# Patient Record
Sex: MALE | Race: WHITE
[De-identification: names, ages, dates, MRNs, and addresses within clinical notes are randomized per-mention and may not be internally consistent; named-entity substitution may affect disease eponyms.]

---

## 2017-05-09 NOTE — PDGENHP
History and Physical





- Chief Complaint


  Acute weakness





- History of Present Illness


 PCP:  Dr. Vargas





HPI:  56-year-old male presenting with acute weakness characterized as 

generalized with associated jaundice and constipation, rendering him unable to 

ambulate and care for self at transitional housing.  The patient reports that 

his onset of symptoms was several months ago and duration has been persistently 

worsening thereafter.  His weakness is exacerbated most pronounced when he 

attempts to ambulate and this has resulted in him being very minimally active 

over the past 24 hours.  He does report that he has been continuing to drink 

alcohol in an attempt to self medicate depression after his mother's death 

several months ago.  His last drink of alcohol was the morning of this 

presentation.  He also endorses some pain located in his right lower quadrant 

which he believes is somewhat alleviated by ibuprofen.  He denies any nausea 

vomiting diarrhea or dysuria but does endorse a cough and reports the abrasions 

on his face and forehead are from chronic picking.





History Information





- Allergies/Home Medication List


Allergies/Adverse Reactions: 








No Known Allergies Allergy (Verified 05/09/17 16:11)


 





Home Medications: 








Gabapentin [Neurontin 300 MG (*)] 300 mg PO BID 05/09/17 [Last Taken 05/09/17]


Ibuprofen Dose Unknown 1 tab PO DAILY 05/09/17 [Last Taken Unknown]





I have personally reviewed and updated: family history, medical history, social 

history, surgical history





- Past Medical History


hypertension


Additional medical history: Alcoholism.  Hepatitis-C virus.  Necrotizing 

fasciitis right lower extremity.  Depression with previous hospitalizations in 

behavioral health





- Surgical History


Reports: no pertinent surgical hx





- Family History


Additional family history: Mother with Alzheimer's dementia and major 

depressive disorder





- Social History


Smoking Status: Never smoked


Alcohol Use: Occasionally


Drug Use: None


Additional social history: lives in transitional housing [facility]





Review of Systems


ROS: 10pt was reviewed & negative except for what was stated in HPI & below


Constitutional: Reports: weakness


Respiratory: Reports: cough


Gastrointestinal: Reports: abdominal pain





Physical Exam














Temp Pulse Resp BP Pulse Ox


 


 36.8 C   129 H  18   135/92 H  94 


 


 05/09/17 20:24  05/09/17 20:24  05/09/17 20:24  05/09/17 20:24  05/09/17 20:24











Constitutional: no apparent distress, not in pain, chronically ill appearing, 

obese, uncomfortable, unkempt


Eyes: EOMI, icteric sclera, other ( slightly dilated pupils)


Ears, Nose, Mouth, Throat: hearing normal, other ( tacky mucous membranes)


Cardiovascular: systolic murmur ( 2/6 at the sternum), tachycardia, No 

irregularly irregular, No edema


Respiratory: expiratory wheeze ( bilateral), No reduced air movement, No 

inspiratory crackles, No bronchial breath sounds, No respiratory distress


Gastrointestinal: normoactive bowel sounds, tenderness ( right mid flank), 

ascites, distension ( moderate)


Skin: other ( jaundiced with abrasions on his right side of his nose for head 

without surrounding erythema)


Neurologic: AAOx3, asterixes, No weakness ( motor strength 5/5 bilateral lower 

extremities, 5/5 bilateral upper extremity)


Psychiatric: interacting appropriately, not anxious, not encephalopathic, 

thought process linear





Lab Data & Imaging Review





 05/09/17 16:08





 05/09/17 16:08














WBC  7.83 10^3/uL (3.80-9.50)   05/09/17  16:08    


 


RBC  3.84 10^6/uL (4.40-6.38)  L  05/09/17  16:08    


 


Hgb  14.5 g/dL (13.7-17.5)   05/09/17  16:08    


 


Hct  40.0 % (40.0-51.0)   05/09/17  16:08    


 


MCV  104.2 fL (81.5-99.8)  H  05/09/17  16:08    


 


MCH  37.8 pg (27.9-34.1)  H  05/09/17  16:08    


 


MCHC  36.3 g/dL (32.4-36.7)   05/09/17  16:08    


 


RDW  17.7 % (11.5-15.2)  H  05/09/17  16:08    


 


Plt Count  127 10^3/uL (150-400)  L  05/09/17  16:08    


 


MPV  11.2 fL (8.7-11.7)   05/09/17  16:08    


 


Neut % (Auto)  65.9 % (39.3-74.2)   05/09/17  16:08    


 


Lymph % (Auto)  19.3 % (15.0-45.0)   05/09/17  16:08    


 


Mono % (Auto)  12.3 % (4.5-13.0)   05/09/17  16:08    


 


Eos % (Auto)  1.0 % (0.6-7.6)   05/09/17  16:08    


 


Baso % (Auto)  0.9 % (0.3-1.7)   05/09/17  16:08    


 


Nucleat RBC Rel Count  0.3 % (0.0-0.2)  H  05/09/17  16:08    


 


Absolute Neuts (auto)  5.16 10^3/uL (1.70-6.50)   05/09/17  16:08    


 


Absolute Lymphs (auto)  1.51 10^3/uL (1.00-3.00)   05/09/17  16:08    


 


Absolute Monos (auto)  0.96 10^3/uL (0.30-0.80)  H  05/09/17  16:08    


 


Absolute Eos (auto)  0.08 10^3/uL (0.03-0.40)   05/09/17  16:08    


 


Absolute Basos (auto)  0.07 10^3/uL (0.02-0.10)   05/09/17  16:08    


 


Absolute Nucleated RBC  0.02 10^3/uL (0-0.01)  H  05/09/17  16:08    


 


Immature Gran %  0.6 % (0.0-1.1)   05/09/17  16:08    


 


Immature Gran #  0.05 10^3/uL (0.00-0.10)   05/09/17  16:08    


 


Sodium  134 mEq/L (134-144)   05/09/17  16:08    


 


Potassium  3.1 mEq/L (3.5-5.2)  L  05/09/17  16:08    


 


Chloride  93 mEq/L ()  L  05/09/17  16:08    


 


Carbon Dioxide  22 mEq/l (22-31)   05/09/17  16:08    


 


Anion Gap  19 mEq/L (8-16)  H  05/09/17  16:08    


 


BUN  6 mg/dL (7-23)  L  05/09/17  16:08    


 


Creatinine  0.6 mg/dL (0.7-1.3)  L  05/09/17  16:08    


 


Estimated GFR  > 60   05/09/17  16:08    


 


Glucose  107 mg/dL ()  H  05/09/17  16:08    


 


Calcium  8.5 mg/dL (8.5-10.4)   05/09/17  16:08    


 


Total Bilirubin  6.5 mg/dL (0.1-1.4)  H  05/09/17  16:08    


 


Conjugated Bilirubin  5.2 mg/dL (0.0-0.5)  H  05/09/17  16:08    


 


Unconjugated Bilirubin  1.3 mg/dL (0.0-1.1)  H  05/09/17  16:08    


 


AST  123 IU/L (17-59)  H  05/09/17  16:08    


 


ALT  49 IU/L (21-72)   05/09/17  16:08    


 


Alkaline Phosphatase  174 IU/L ()  H  05/09/17  16:08    


 


Ammonia  58.0 uMOL/L (9.0-30.0)  H  05/09/17  16:15    


 


Total Protein  7.6 g/dL (6.3-8.2)   05/09/17  16:08    


 


Albumin  3.3 g/dL (3.5-5.0)  L  05/09/17  16:08    


 


Lipase  110.0 IU/L ()   05/09/17  16:08    


 


Urine Color  RAJEEV   05/09/17  17:55    


 


Urine Appearance  CLEAR   05/09/17  17:55    


 


Urine pH  6.0  (5.0-7.5)   05/09/17  17:55    


 


Ur Specific Gravity  1.016  (1.002-1.030)   05/09/17  17:55    


 


Urine Protein  1+  (NEGATIVE)  H  05/09/17  17:55    


 


Urine Ketones  TRACE  (NEGATIVE)  H  05/09/17  17:55    


 


Urine Blood  NEGATIVE  (NEGATIVE)   05/09/17  17:55    


 


Urine Nitrate  NEGATIVE  (NEGATIVE)   05/09/17  17:55    


 


Urine Bilirubin  POSITIVE  (NEGATIVE)  H  05/09/17  17:55    


 


Urine Urobilinogen  4.0 EU (0.2-1.0)  H  05/09/17  17:55    


 


Ur Leukocyte Esterase  NEGATIVE  (NEGATIVE)   05/09/17  17:55    


 


Urine RBC  NONE SEEN /hpf (0-3)   05/09/17  17:55    


 


Urine WBC  1-3 /hpf (0-3)   05/09/17  17:55    


 


Ur Epithelial Cells  NONE SEEN /lpf (NONE-1+)   05/09/17  17:55    


 


Urine Mucus  TRACE /lpf (NONE-1+)   05/09/17  17:55    


 


Urine Glucose  NEGATIVE  (NEGATIVE)   05/09/17  17:55    


 


Ethyl Alcohol  191 mg/dL (0-10)  H  05/09/17  16:08    








Visualized and Interpreted EKG results: Yes


EKG Interpretation: Positive for: other ( sinus tach with right bundle-branch 

block)





Assessment & Plan


Assessment: 








 56-year-old male presents with acute on chronic weakness and jaundice in the 

setting alcoholism, suspected alcohol induced hepatitis








Plan: 








1. Weakness.  Acute on chronic, most likely secondary to a combination of 

hepatic encephalopathy, developing cirrhosis, possibly underlying infection, 

alcohol induced myopathy


-will need extensive physical and occupational therapy evaluations to determine 

whether he is safe to return to transitional housing whether he needs to go to 

a skilled nursing facility for rehab


-will address all these issues below





2. Hepatic encephalopathy.  Evidenced by positive asterixis, slowed mental 

processes, ammonia level of 58, no outpatient use of lactulose


-give scheduled lactulose and gauge effect





3. Alcoholism.  Positive alcohol level, patient is not wildly intoxicated, he 

will be at risk for alcohol withdrawal beginning tomorrow, and if demonstrates 

signs sooner will place on CIWA protocol


-outside records reviewed including DC Summary by Isaac Rosario 2/2/15, reporting 

patient required CIWA for EtOH w/d


-he could have it degree of alcohol induced myopathy, get physical assessments 

as outlined above


-recommend complete abstinence





4. Systemic inflammatory response syndrome.  Acute, new problem this provider, 

further workup indicated.  Tachycardia and tachypnea without leukocytosis or 

fever, no clear source of infection


-unclear whether patient has evolving infection contributing to his weakness


-get chest x-ray to rule out pneumonia given his cough


-get diagnostic and therapeutic paracentesis to rule out SBP given his 

abdominal pain


-empirically give IV normal saline with supplemental potassium as well as 

prophylactic dosing of IV ceftriaxone given his possibility of varices 

rendering him at high risk for SBP during his hospitalization, 5 days of 

treatment indicated if remains hospitalized





5. Possible alcohol-induced hepatitis.  Elevated AST, elevated bilirubin level, 

right flank pain with steatohepatitis on ultrasound


-continue to treat pain with ibuprofen first-line, low-dose oxycodone 2nd line





Diet.  Regular





Prophylaxis.  Low risk patient, SCDs





Code. do not resuscitate per patient his sister would be is [place] she lives in 

[place]





Disposition.  Anticipated discharge is 5/10, pending further workup as outlined 

above, patient requires prolonged length stay for further diagnostic evaluation 

or clinical worsening, he should be upgraded to inpatient admission status at 

that time.





I have discussed patient's presentation with Dr. Amador Fletcher, we both agree 

the patient is appropriate for [facility] unit at this time.

## 2017-05-09 NOTE — EDPHY
H & P


HPI/ROS: 





CHIEF COMPLAINT:  Generalized weakness





HISTORY OF PRESENT ILLNESS:  The patient is a 56-year-old alcoholic man who 

comes from a group home near the shelter complaining of generalized weakness 

for the last month.  He states that he is hardly able to walk and get to the 

bathroom.  He has not had a fever.  No vomiting or diarrhea.  No pain.  He does 

have a history of extensive alcohol abuse, hepatitis C as well as hypertension, 

Necrotizing fasciitis of the right lower extremity, squamous cell carcinoma of 

the right shoulder.  He is jaundiced appearing although he states that he has 

not noticed.  He has been admitted previously for alcohol withdrawal.








REVIEW OF SYSTEMS:


Constitutional:  denies: chills, fever, recent illness, recent injury


EENTM: denies: blurred vision, double vision, nose congestion


Respiratory: denies: cough, shortness of breath


Cardiac: denies: chest pain, irregular heart rate, lightheadedness, palpitations


Gastrointestinal/Abdominal: denies: abdominal pain, diarrhea, nausea, vomiting, 

blood streaked stools


Genitourinary: denies: dysuria, frequency, hematuria, 


Musculoskeletal: denies: joint pain, muscle pain


Skin: several small lesions,  bruising


Neurological: denies: headache, numbness, paresthesia, tingling, dizziness, 

weakness


Hematologic/Lymphatic: denies: blood clots, easy bleeding, easy bruising


Immunologic/allergic: denies: HIV/AIDS, transplant








EXAM:


GENERAL:  Well-appearing, well-nourished and in no acute distress.


HEAD:  Atraumatic, normocephalic.


EYES:  Pupils equal round and reactive to light, extraocular movements intact, 

sclera icteric, conjunctiva are normal.


ENT:  TMs normal, nares patent, oropharynx clear without exudates.  Moist 

mucous membranes.


NECK:  Normal range of motion, supple without lymphadenopathy or JVD.


LUNGS:  Breath sounds clear to auscultation bilaterally and equal.  No wheezes 

rales or rhonchi.


HEART:  Regular rate and rhythm without murmurs, rubs or gallops.


ABDOMEN:  Soft, nontender, normoactive bowel sounds.  No guarding, no rebound.  

No masses appreciated.


BACK:  No CVA tenderness, no spinal tenderness, step-offs or deformities


EXTREMITIES:  Normal range of motion, no pitting or edema.  No clubbing or 

cyanosis.


NEUROLOGICAL:  Cranial nerves II through XII grossly intact.  Normal speech, 

normal gait.  5/5 strength, normal movement in all extremities, normal sensation


PSYCH:  Normal mood, normal affect.


SKIN:  Patient has multiple small scabs over his chest and face.  He has 

jaundice appearing





Source: Patient, EMS, Old records


Exam Limitations: Clinical condition





- Personal History


Tetanus Vaccine Date: 2013





- Medical/Surgical History


Hx Asthma: No


Hx Chronic Respiratory Disease: No


Hx Diabetes: No


Hx Cardiac Disease: Yes


Hx Renal Disease: No


Hx Cirrhosis: No


Hx Alcoholism: Yes


Hx HIV/AIDS: No


Hx Splenectomy or Spleen Trauma: No


Other PMH: necrotizing fascitis RLE 2014, HTN, Depression, ETOH





- Family History


Significant Family History: No pertinent family hx





- Social History


Smoking Status: Never smoked


Alcohol Use: Sober


Drug Use: None


Constitutional: 


 Initial Vital Signs











Temperature (C)  36.7 C   05/09/17 16:12


 


Heart Rate  130 H  05/09/17 16:12


 


Respiratory Rate  24 H  05/09/17 16:12


 


Blood Pressure  137/107 H  05/09/17 16:12


 


O2 Sat (%)  95   05/09/17 16:12








 











O2 Delivery Mode               Room Air














Allergies/Adverse Reactions: 


 





No Known Allergies Allergy (Verified 05/09/17 16:11)


 








Home Medications: 














 Medication  Instructions  Recorded


 


Gabapentin [Neurontin 300 MG (*)] 300 mg PO BID 05/09/17


 


Ibuprofen Dose Unknown 1 tab PO DAILY 05/09/17














Medical Decision Making





- Diagnostics


EKG Interpretation: 





An EKG obtained and was read and documented in trace view.  Please see trace 

view for full reading and report.  Sinus tachycardia with right bundle branch 

block, similar to previous, no acute ischemic changes 


Imaging Results: 


 Imaging Impressions





Abdomen Ultrasound  05/09/17 17:02


IMPRESSION:


 


1. Technically limited evaluation secondary to the patient's body habitus and 

overlying bowel gas, precluding evaluation of the pancreas and the mid-to-

distal portions of the abdominal aorta, and with limited penetrance of the 

liver.


2. Moderate diffuse steatosis.


3. Gallbladder sludge, with no cholelithiasis, cholecystitis, or bile duct 

dilatation.


 


Findings were discussed with KELLI BETANCOURT MD at  17:47, on 5/9/2017.


 


If there is further clinical concern regarding the patient's right upper 

quadrant abdominal pain, CT imaging could be considered.


 


 











ED Course/Re-evaluation: 





6:10 p.m. discussed the case with Dr. Christopher Maloney who will admit to the 

medical service.  He will start lactulose.


Differential Diagnosis: 





Partial list of the Differential diagnosis considered include but were not 

limited to;  cirrhosis, cephalopathy, infection, dehydration and although 

unlikely based on the history and physical exam, I also considered pancreatitis

, cholecystitis, sepsis, CVA. 








- Data Points


Laboratory Results: 


 Laboratory Results





 05/09/17 16:08 





 05/09/17 16:08 





 











  05/09/17 05/09/17 05/09/17





  17:55 16:15 16:08


 


WBC      





    


 


RBC      





    


 


Hgb      





    


 


Hct      





    


 


MCV      





    


 


MCH      





    


 


MCHC      





    


 


RDW      





    


 


Plt Count      





    


 


MPV      





    


 


Neut % (Auto)      





    


 


Lymph % (Auto)      





    


 


Mono % (Auto)      





    


 


Eos % (Auto)      





    


 


Baso % (Auto)      





    


 


Nucleat RBC Rel Count      





    


 


Absolute Neuts (auto)      





    


 


Absolute Lymphs (auto)      





    


 


Absolute Monos (auto)      





    


 


Absolute Eos (auto)      





    


 


Absolute Basos (auto)      





    


 


Absolute Nucleated RBC      





    


 


Immature Gran %      





    


 


Immature Gran #      





    


 


Sodium      





    


 


Potassium      





    


 


Chloride      





    


 


Carbon Dioxide      





    


 


Anion Gap      





    


 


BUN      





    


 


Creatinine      





    


 


Estimated GFR      





    


 


Glucose      





    


 


Calcium      





    


 


Total Bilirubin      





    


 


Conjugated Bilirubin      





    


 


Unconjugated Bilirubin      





    


 


AST      





    


 


ALT      





    


 


Alkaline Phosphatase      





    


 


Ammonia    58.0 uMOL/L H uMOL/L  





    (9.0-30.0)  


 


Total Protein      





    


 


Albumin      





    


 


Lipase      





    


 


Urine Color  RAJEEV     





    


 


Urine Appearance  CLEAR     





    


 


Urine pH  6.0     





   (5.0-7.5)   


 


Ur Specific Gravity  1.016     





   (1.002-1.030)   


 


Urine Protein  1+  H     





   (NEGATIVE)   


 


Urine Ketones  TRACE  H     





   (NEGATIVE)   


 


Urine Blood  NEGATIVE     





   (NEGATIVE)   


 


Urine Nitrate  NEGATIVE     





   (NEGATIVE)   


 


Urine Bilirubin  POSITIVE  H     





   (NEGATIVE)   


 


Urine Urobilinogen  4.0 EU H EU    





   (0.2-1.0)   


 


Ur Leukocyte Esterase  NEGATIVE     





   (NEGATIVE)   


 


Urine RBC  NONE SEEN /hpf /hpf    





   (0-3)   


 


Urine WBC  1-3 /hpf /hpf    





   (0-3)   


 


Ur Epithelial Cells  NONE SEEN /lpf /lpf    





   (NONE-1+)   


 


Urine Mucus  TRACE /lpf /lpf    





   (NONE-1+)   


 


Urine Glucose  NEGATIVE     





   (NEGATIVE)   


 


Ethyl Alcohol      191 mg/dL H mg/dL





     (0-10) 














  05/09/17 05/09/17





  16:08 16:08


 


WBC    7.83 10^3/uL 10^3/uL





    (3.80-9.50) 


 


RBC    3.84 10^6/uL L 10^6/uL





    (4.40-6.38) 


 


Hgb    14.5 g/dL g/dL





    (13.7-17.5) 


 


Hct    40.0 % %





    (40.0-51.0) 


 


MCV    104.2 fL H fL





    (81.5-99.8) 


 


MCH    37.8 pg H pg





    (27.9-34.1) 


 


MCHC    36.3 g/dL g/dL





    (32.4-36.7) 


 


RDW    17.7 % H %





    (11.5-15.2) 


 


Plt Count    127 10^3/uL L 10^3/uL





    (150-400) 


 


MPV    11.2 fL fL





    (8.7-11.7) 


 


Neut % (Auto)    65.9 % %





    (39.3-74.2) 


 


Lymph % (Auto)    19.3 % %





    (15.0-45.0) 


 


Mono % (Auto)    12.3 % %





    (4.5-13.0) 


 


Eos % (Auto)    1.0 % %





    (0.6-7.6) 


 


Baso % (Auto)    0.9 % %





    (0.3-1.7) 


 


Nucleat RBC Rel Count    0.3 % H %





    (0.0-0.2) 


 


Absolute Neuts (auto)    5.16 10^3/uL 10^3/uL





    (1.70-6.50) 


 


Absolute Lymphs (auto)    1.51 10^3/uL 10^3/uL





    (1.00-3.00) 


 


Absolute Monos (auto)    0.96 10^3/uL H 10^3/uL





    (0.30-0.80) 


 


Absolute Eos (auto)    0.08 10^3/uL 10^3/uL





    (0.03-0.40) 


 


Absolute Basos (auto)    0.07 10^3/uL 10^3/uL





    (0.02-0.10) 


 


Absolute Nucleated RBC    0.02 10^3/uL H 10^3/uL





    (0-0.01) 


 


Immature Gran %    0.6 % %





    (0.0-1.1) 


 


Immature Gran #    0.05 10^3/uL 10^3/uL





    (0.00-0.10) 


 


Sodium  134 mEq/L mEq/L  





   (134-144)  


 


Potassium  3.1 mEq/L L mEq/L  





   (3.5-5.2)  


 


Chloride  93 mEq/L L mEq/L  





   ()  


 


Carbon Dioxide  22 mEq/l mEq/l  





   (22-31)  


 


Anion Gap  19 mEq/L H mEq/L  





   (8-16)  


 


BUN  6 mg/dL L mg/dL  





   (7-23)  


 


Creatinine  0.6 mg/dL L mg/dL  





   (0.7-1.3)  


 


Estimated GFR  > 60   





   


 


Glucose  107 mg/dL H mg/dL  





   ()  


 


Calcium  8.5 mg/dL mg/dL  





   (8.5-10.4)  


 


Total Bilirubin  6.5 mg/dL H mg/dL  





   (0.1-1.4)  


 


Conjugated Bilirubin  5.2 mg/dL H mg/dL  





   (0.0-0.5)  


 


Unconjugated Bilirubin  1.3 mg/dL H mg/dL  





   (0.0-1.1)  


 


AST  123 IU/L H IU/L  





   (17-59)  


 


ALT  49 IU/L IU/L  





   (21-72)  


 


Alkaline Phosphatase  174 IU/L H IU/L  





   ()  


 


Ammonia    





   


 


Total Protein  7.6 g/dL g/dL  





   (6.3-8.2)  


 


Albumin  3.3 g/dL L g/dL  





   (3.5-5.0)  


 


Lipase  110.0 IU/L IU/L  





   ()  


 


Urine Color    





   


 


Urine Appearance    





   


 


Urine pH    





   


 


Ur Specific Gravity    





   


 


Urine Protein    





   


 


Urine Ketones    





   


 


Urine Blood    





   


 


Urine Nitrate    





   


 


Urine Bilirubin    





   


 


Urine Urobilinogen    





   


 


Ur Leukocyte Esterase    





   


 


Urine RBC    





   


 


Urine WBC    





   


 


Ur Epithelial Cells    





   


 


Urine Mucus    





   


 


Urine Glucose    





   


 


Ethyl Alcohol    





   











Medications Given: 


 








Discontinued Medications





Ammonia (Aromatic Spirit) (Ammonia Aromatic)  1 each IH EDNOW ONE


   Stop: 05/09/17 16:10


   Last Admin: 05/09/17 16:17 Dose:  Not Given


Sodium Chloride (Ns)  1,000 mls @ 0 mls/hr IV ONCE ONE


   PRN Reason: Wide Open


   Stop: 05/09/17 16:10


   Last Admin: 05/09/17 16:23 Dose:  1,000 mls


Sodium Chloride (Ns)  1,000 mls @ 0 mls/hr IV ONCE ONE


   PRN Reason: Wide Open


   Stop: 05/09/17 18:12


   Last Admin: 05/09/17 18:11 Dose:  1,000 mls


Ceftriaxone Sodium/Dextrose (Rocephin 1 Gm (Premix))  50 mls @ 100 mls/hr IV 

DAILY QUINCY


   PRN Reason: Protocol


   Stop: 06/08/17 20:59


   Last Admin: 05/09/17 21:56 Dose:  Not Given


Lorazepam (Ativan Injection)  1 mg IVP ONCE ONE


   Stop: 05/09/17 18:51


   Last Admin: 05/09/17 19:17 Dose:  1 mg








Departure





- Departure


Disposition: [facility]


Clinical Impression: 


 Alcoholism, Hyperbilirubinemia, Weakness





Condition: Fair

## 2017-05-09 NOTE — CPEKG
Heart Rate: 121

RR Interval: 496

P-R Interval: 136

QRSD Interval: 136

QT Interval: 356

QTC Interval: 505

P Axis: 70

QRS Axis: 65

T Wave Axis: 22

EKG Severity - ABNORMAL ECG -

EKG Impression: SINUS TACHYCARDIA

EKG Impression: RIGHT BUNDLE BRANCH BLOCK

Electronically Signed By: Amador Fletcher 09-May-2017 16:18:12

## 2017-05-10 NOTE — HOSPPROG
Hospitalist Progress Note


Assessment/Plan: 





*  Etoh withdrawal


   -CIWA, prn ativan


*  Etoh hepatitis


   -follow LFT


*  Hepatic encephalopathy


   -lactulose


*  Cough


   -recheck CXR in am


*  Low electrolytes due to Etoh


   -replete per protocol


*  Obesity BMI 33











Subjective: coughing badly, arthritis hurts


Objective: 


 Vital Signs











Temp Pulse Resp BP Pulse Ox


 


 36.4 C   116 H  20   103/72   92 


 


 05/10/17 15:31  05/10/17 15:33  05/10/17 15:31  05/10/17 15:33  05/10/17 15:31








 











PT  16.8 SEC (12.0-15.0)  H  05/10/17  05:15    


 


INR  1.36  (0.83-1.16)  H  05/10/17  05:15    








CXR viewed, my personal interpretation is - no PNA


ABD US - liver looks okay, no ascites


 Laboratory Tests











  05/10/17 05/10/17





  05:15 05:15


 


INR  1.36 H 


 


Phosphorus   2.4 L


 


Magnesium   1.4 L


 


Total Bilirubin   5.9 H


 


Conjugated Bilirubin   4.6 H


 


Unconjugated Bilirubin   1.3 H


 


AST   119 H


 


Alkaline Phosphatase   135 H


 


Albumin   2.7 L














- Physical Exam


Constitutional: no apparent distress, appears nourished, not in pain


Cardiovascular: regular rate and rhythym, no murmur, rub, or gallop


Respiratory: no respiratory distress, no rales or rhonchi, clear to auscultation


Gastrointestinal: normoactive bowel sounds, soft, non-tender abdomen, no 

palpable masses


Skin: no rashes or abrasions, no fluctuance, no induration


Neurologic: AAOx3, sensation intact bilaterally


Psychiatric: interacting appropriately, not anxious, not encephalopathic, 

thought process linear





ICD10 Worksheet


Patient Problems: 


 Problems











Problem Status Onset


 


Hyperbilirubinemia Acute  


 


Weakness Acute  


 


Alcoholism Chronic  


 


Alcohol dependence Active  


 


Cellulitis Active  


 


Kidney disease Active  


 


Necrotizing fasciitis Active  


 


Acute renal failure Acute  


 


Edema extremities Acute  


 


HTN (hypertension) Acute  


 


Hyperkalemia Acute  


 


MRSA (methicillin resistant staph aureus) culture positive Acute  07/21/14


 


Pneumonia Acute  


 


Squamous cell cancer of skin of right shoulder Acute  


 


Squamous cell carcinoma Acute  


 


Gait instability Chronic  


 


Hepatitis C Chronic  


 


Low back pain Chronic

## 2017-05-11 NOTE — HOSPPROG
Hospitalist Progress Note


Assessment/Plan: 





*  Etoh withdrawal


   -CIWA, prn ativan - seems resolved


*  Etoh hepatitis


   -follow LFT - improving


*  Hepatic encephalopathy


   -lactulose


*  Low electrolytes due to Etoh


   -replete per protocol


*  Obesity BMI 33


*  Acute weakness


   -PT/OT recommends SNF


   -I suspect he will rebound quickly here and be able to return home 1-2 days








Subjective: no complaints.


Objective: 


 Vital Signs











Temp Pulse Resp BP Pulse Ox


 


 36.8 C   94   18   113/81 H  93 


 


 05/11/17 11:13  05/11/17 11:13  05/11/17 11:13  05/11/17 11:13  05/11/17 11:13








 Laboratory Results





 05/11/17 12:32 





 05/11/17 12:32 





 











 05/10/17 05/11/17 05/12/17





 05:59 05:59 05:59


 


Intake Total  250 240


 


Balance  250 240








 











PT  16.8 SEC (12.0-15.0)  H  05/10/17  05:15    


 


INR  1.36  (0.83-1.16)  H  05/10/17  05:15    








CXR viewed, my personal interpretation is - no PNA, + atelectasis





- Physical Exam


Constitutional: no apparent distress, appears nourished, not in pain


Cardiovascular: regular rate and rhythym, no murmur, rub, or gallop


Respiratory: no respiratory distress, no rales or rhonchi, clear to auscultation


Gastrointestinal: normoactive bowel sounds, soft, non-tender abdomen, no 

palpable masses


Skin: no rashes or abrasions, no fluctuance, no induration


Neurologic: AAOx3, sensation intact bilaterally


Psychiatric: interacting appropriately, not anxious, not encephalopathic, 

thought process linear





ICD10 Worksheet


Patient Problems: 


 Problems











Problem Status Onset


 


Hyperbilirubinemia Acute  


 


Weakness Acute  


 


Alcoholism Chronic  


 


Alcohol dependence Active  


 


Cellulitis Active  


 


Kidney disease Active  


 


Necrotizing fasciitis Active  


 


Acute renal failure Acute  


 


Edema extremities Acute  


 


HTN (hypertension) Acute  


 


Hyperkalemia Acute  


 


MRSA (methicillin resistant staph aureus) culture positive Acute  07/21/14


 


Pneumonia Acute  


 


Squamous cell cancer of skin of right shoulder Acute  


 


Squamous cell carcinoma Acute  


 


Gait instability Chronic  


 


Hepatitis C Chronic  


 


Low back pain Chronic

## 2017-05-12 NOTE — HOSPPROG
Hospitalist Progress Note


Assessment/Plan: 





*  Etoh withdrawal


   -CIWA, prn ativan - seems resolved


*  Etoh hepatitis


   -follow LFT


*  Hepatic encephalopathy - ammonia rising


   -lactulose use limited by profuse diarrhea - check stool studies


   -add rifaximin


*  Low electrolytes due to Etoh


   -replete per protocol


*  Obesity BMI 33











Subjective: Diarrhea, poor continence of stool


Objective: 


 Vital Signs











Temp Pulse Resp BP Pulse Ox


 


 36.6 C   84   16   120/79   93 


 


 05/12/17 15:35  05/12/17 15:35  05/12/17 15:35  05/12/17 15:35  05/12/17 15:35








 Laboratory Results





 05/12/17 04:51 





 05/12/17 04:51 





 











 05/11/17 05/12/17 05/13/17





 05:59 05:59 05:59


 


Intake Total 250 680 440


 


Balance 250 680 440








 











PT  16.8 SEC (12.0-15.0)  H  05/10/17  05:15    


 


INR  1.36  (0.83-1.16)  H  05/10/17  05:15    














- Physical Exam


Constitutional: no apparent distress, appears nourished, not in pain


Cardiovascular: regular rate and rhythym, no murmur, rub, or gallop


Respiratory: no respiratory distress, no rales or rhonchi, clear to auscultation


Gastrointestinal: normoactive bowel sounds, soft, non-tender abdomen, no 

palpable masses


Skin: no rashes or abrasions, no fluctuance, no induration


Neurologic: AAOx3, sensation intact bilaterally


Psychiatric: interacting appropriately, not anxious, not encephalopathic, 

thought process linear





ICD10 Worksheet


Patient Problems: 


 Problems











Problem Status Onset


 


Hyperbilirubinemia Acute  


 


Weakness Acute  


 


Alcoholism Chronic  


 


Alcohol dependence Active  


 


Cellulitis Active  


 


Kidney disease Active  


 


Necrotizing fasciitis Active  


 


Acute renal failure Acute  


 


Edema extremities Acute  


 


HTN (hypertension) Acute  


 


Hyperkalemia Acute  


 


MRSA (methicillin resistant staph aureus) culture positive Acute  07/21/14


 


Pneumonia Acute  


 


Squamous cell cancer of skin of right shoulder Acute  


 


Squamous cell carcinoma Acute  


 


Gait instability Chronic  


 


Hepatitis C Chronic  


 


Low back pain Chronic

## 2017-05-13 NOTE — HOSPPROG
Hospitalist Progress Note


Assessment/Plan: 


 56-year-old alcoholic is admitted with increasing weakness.  He also has 

diarrhea and C diff colitis noted.





#    Alcoholism with acute alcohol withdrawal status post CIWA protocol, 

appears to be improved at this time





#   alcoholic hepatitis, appears to be improving.  Ammonia improved as well





#   hepatic encephalopathy, improved ammonia, mental status appears normal





#   C diff colitis with bowel incontinence, currently on p.o. Vanco will watch 

today and increase activity as tolerated likely home tomorrow or Monday





#   abnormal electrolytes, replaced per protocol.  Likely secondary to alcohol 

use





#   obesity, BMI 33








Subjective: patient new to me and chart reviewed.  Still feels quite weak and 

does not know if he can't ambulate today due to his fecal incontinence from the 

C diff.  But eating better today


Objective: 


 Vital Signs











Temp Pulse Resp BP Pulse Ox


 


 36.8 C   112 H  18   122/90 H  94 


 


 05/13/17 07:47  05/13/17 07:47  05/13/17 07:47  05/13/17 07:47  05/13/17 07:47








 Microbiology











 05/12/17 15:00 Gastrointestinal Tract Panel (PCR) - Final





 Stool    Clostridium Difficile Detected








 Laboratory Results





 05/13/17 04:42 





 05/13/17 04:42 





 











 05/12/17 05/13/17 05/14/17





 05:59 05:59 05:59


 


Intake Total 680 690 


 


Balance 680 690 








 











PT  16.8 SEC (12.0-15.0)  H  05/10/17  05:15    


 


INR  1.36  (0.83-1.16)  H  05/10/17  05:15    














- Physical Exam


Constitutional: chronically ill appearing, obese, uncomfortable


Eyes: PERRL, EOMI, icteric sclera


Ears, Nose, Mouth, Throat: moist mucous membranes


Cardiovascular: regular rate and rhythym, no murmur, rub, or gallop, pulses 

symmetric bilaterally, No tachycardia, No bradycardia, No edema


Respiratory: no respiratory distress, no rales or rhonchi, reduced air movement

, bronchial breath sounds


Gastrointestinal: normoactive bowel sounds, soft, non-tender abdomen


Genitourinary: no bladder fullness


Skin: warm, normal color


Neurologic: AAOx3, No facial droop


Psychiatric: interacting appropriately, not anxious, not encephalopathic





ICD10 Worksheet


Patient Problems: 


 Problems











Problem Status Onset


 


Alcohol dependence Active  


 


Kidney disease Active  


 


Cellulitis Active  


 


Necrotizing fasciitis Active  


 


Acute renal failure Acute  


 


Squamous cell carcinoma Acute  


 


Alcoholism Chronic  


 


Gait instability Chronic  


 


Squamous cell cancer of skin of right shoulder Acute  


 


Hyperkalemia Acute  


 


Low back pain Chronic  


 


Hepatitis C Chronic  


 


Edema extremities Acute  


 


HTN (hypertension) Acute  


 


MRSA (methicillin resistant staph aureus) culture positive Acute  07/21/14


 


Pneumonia Acute  


 


Hyperbilirubinemia Acute  


 


Weakness Acute

## 2017-05-14 NOTE — PDIAF
- Diagnosis


Diagnosis: alcoholism and withdrawal, c diff colitis, electrolyte abnormalities


Code Status: Do Not Resuscitate





- Medication Management


Discharge Medications: 


 Medications to Continue on Transfer





Gabapentin [Neurontin 300 MG (*)] 1,200 mg PO TID PRN 05/10/17 [Last Taken 

Unknown]


Ibuprofen [Motrin (*)] 800 mg PO TIDMEAL PRN 05/10/17 [Last Taken Unknown]


Metoprolol Tartrate [Lopressor 25 mg (*)] 25 mg PO DAILY 05/10/17 [Last Taken 

Unknown]


Triamcinolone 0.1% [Triamcinolone 0.1% Cream (*)] 1 elizabeth TP BID PRN 05/10/17 [

Last Taken Unknown]


Thiamine HCl [Vitamin B-1] 100 mg PO DAILY #0 tab 05/14/17 [Last Taken Unknown]


Vancomycin [Vancocin Oral Liquid] 125 mg PO QID #40 udl 05/14/17 [Last Taken 

Unknown]


oxyCODONE IR [Oxycodone Ir (*)] 2.5 - 5 mg PO Q6 PRN #25 tab 05/14/17 [Last 

Taken Unknown]








Discharge Medications: Refer to the Discharge Home Medication list for PRN 

reason.





- Orders


Services needed: Home Care, Registered Nurse, Master  (ongoing 

alcohol use with evidence of liver disease.), Physical Therapy, Occupational 

Therapy


Home Care Face to Face: I certify that this patient was under my care and that 

I had the required face-to-face encounter meeting the encounter requirements on 

the discharge day.  My findings support the fact that the patient is homebound 

as defined in CMS Chapter 7 Medicare Benefits Manual 30.1.1, The condition of 

the patient is such that there exists a normal inability to leave home and 

consequently, leaving home would require a considerable and taxing effort.


Diet Recommendation: no restrictions on diet


Diet Texture: Regular Texture Diet





- Follow Up Care


Current Providers and Referrals: 


Patient,NotPresent [Primary Care Provider] - As per Instructions

## 2017-05-14 NOTE — GDS
[f rep st]



                                                             DISCHARGE SUMMARY





DIAGNOSES:  

1.  Diffuse weakness acute on chronic likely secondary to hepatic encephalopathy, alcoholism, and fa
ilure to thrive.

2.  Hepatic encephalopathy, improved.

3.  Alcoholism, ongoing alcohol use.

4.  Alcohol-induced hepatitis, elevated LFTs and bilirubin.

5.  Low-potassium.

6.  Clostridium difficile colitis.



PROCEDURES DONE:  Abdominal ultrasound showing no significant ascites.



HOSPITAL COURSE:  The patient is a 56-year-old, currently in transitional housing.  He has ongoing a
lcohol use and came in with acute on chronic weakness.  On admission, he was found to be in acute al
cohol withdrawal, elevated LFTs consistent with alcoholic hepatitis and possible alcoholic steatosis
.  He was admitted to the hospital.  Went through alcohol withdrawal with Librium and Ativan.  His e
lectrolytes were repleted.  He was hydrated and he worked with Physical therapy.  He did develop melchor
rrhea through his stay.  This was diagnosed as C diff colitis, and he was started on appropriate ant
ibiotics.  



At the time of discharge, he is ambulating with the use of a walker and although he has ongoing weak
ness, he has improved from admission.  I suspect his overall weakness is a combination of alcohol-in
duced myopathy, alcohol-induced encephalopathy, and overall failure to thrive.  His long-term progno
sis is poor if he continues to drink.  His diarrhea was controlled.  He was having 3 bowel movements
 a day and although they were quite runny and he was sometimes incontinent, he did not appear to be 
getting dehydrated from this.



CONDITION ON DISCHARGE:  Fair.



PHYSICAL EXAMINATION:  VITAL SIGNS:  Afebrile, heart rate 90, blood pressure 122/89, respirations 16
.  He is 94% on room air.  GENERAL APPEARANCE:  He is alert and oriented.  HEART:  Regular.  LUNGS: 
 Clear.  ABDOMEN:  Soft.



DISCHARGE MEDICATIONS:  Please see discharge medication form.



FOLLOWUP:  With [facility].  I will also send him home with home care including nursing, physic
al, and occupational therapy.  I will also send the  given his ongoing alcohol use. 



Total time spent with patient on day of discharge and coordination of care is 35 minutes.



Copy requested to:

[facility]



Job #:  [id number]

## 2017-05-25 NOTE — CPEKG
Heart Rate: 84

RR Interval: 714

P-R Interval: 142

QRSD Interval: 120

QT Interval: 428

QTC Interval: 507

P Axis: 74

QRS Axis: 25

T Wave Axis: 19

EKG Severity - ABNORMAL ECG -

EKG Impression: SINUS RHYTHM

EKG Impression: RBBB

Electronically Signed By: Charley Ruiz 25-May-2017 20:27:57

## 2017-05-25 NOTE — EDPHY
H & P


Time Seen by Provider: 05/25/17 16:16


HPI/ROS: 


CHIEF COMPLAINT: Hypoxia





HISTORY OF PRESENT ILLNESS: The patient is a 56-year-old male with cirrhosis 

presenting with hypoxia at home. The patient was recently admitted admitted to 

the hospital for hepatic encephalopathy and discharged home.  Since discharge, 

he has been feeling fatigued and somewhat short of breath, but this has not 

recently changed.  His home healthcare nurse today noticed that he was hypoxic 

on pulse oximetry and that his blood pressure was low and sent him to the 

emergency department for further evaluation. The patient reports difficulty 

with taking a deep breath because his abdomen is so swollen. He states he gets 

somewhat short of breath walking to the bathroom.  His symptoms are worse at 

night and occasionally he wakes up because of the shortness of breath. The 

patient had a cough last week, now resolved. He denies chest pain, abdominal 

pain or fever. 








REVIEW OF SYSTEMS:


A comprehensive 10 point review of systems is otherwise negative aside from 

elements mentioned in the history of present illness.





Past Medical/Surgical History: 





Hypertension, Depression, cirrhosis, Alcohol abuse, Necrotizing fascitis RLE. 


Social History: 





The patient lives at home alone. 


Smoking Status: Never smoked


Physical Exam: 





General Appearance: Alert, oxygen saturation 100% on room air. No tachypnea. 


Eyes: Pupils equal and round, no conjunctival pallor or injection


ENT, Mouth: Mucous membranes moist


Neck: Normal inspection


Respiratory: Decreased breath sounds on the left, no rhonchi or rales


Cardiovascular: Regular rate and rhythm 


Gastrointestinal:  Abdomen is distended, ascites present, no tenderness


Neurological:  A&O, nonfocal, normal gait


Skin:  Warm and dry, no rash


Extremities:  Large scar on the right calf, no calf tenderness


Psychiatric: Mood and affect normal


Constitutional: 


 Initial Vital Signs











Temperature (C)  36.8 C   05/25/17 15:57


 


Heart Rate  86   05/25/17 15:57


 


Respiratory Rate  14   05/25/17 15:57


 


Blood Pressure  91/66 L  05/25/17 15:57


 


O2 Sat (%)  97   05/25/17 15:57








 











O2 Delivery Mode               Room Air














Allergies/Adverse Reactions: 


 





No Known Allergies Allergy (Verified 05/09/17 16:11)


 








Home Medications: 














 Medication  Instructions  Recorded


 


Gabapentin [Neurontin 300 MG (*)] 1,200 mg PO TID PRN 05/10/17


 


Ibuprofen [Motrin (*)] 800 mg PO TIDMEAL PRN 05/10/17


 


Metoprolol Tartrate [Lopressor 25 25 mg PO DAILY 05/10/17





mg (*)]  


 


Triamcinolone 0.1% [Triamcinolone 1 elizabeth TP BID PRN 05/10/17





0.1% Cream (*)]  


 


Thiamine HCl [Vitamin B-1] 100 mg PO DAILY #0 tab 05/14/17


 


Vancomycin [Vancocin Oral Liquid] 125 mg PO QID #40 udl 05/14/17


 


oxyCODONE IR [Oxycodone Ir (*)] 2.5 - 5 mg PO Q6 PRN #25 tab 05/14/17














Medical Decision Making





- Diagnostics


Imaging Results: 


Chest x-ray independently reviewed by me reveals no acute disease.





CT pulmonary angiogram read by the radiologist reveals no evidence of pulmonary 

embolism.  A small pleural effusion is present and the left hemidiaphragm is 

elevated.


Imaging: I viewed and interpreted images myself


ED Course/Re-evaluation: 





This patient presents with reported hypoxia at home.  Patient's oxygen 

saturation is 100% on room air in the ED. On exam he has decreased breath 

sounds on the left.  I suspect that the ascites is pushing on his diaphragm and 

making it hard for him to take a deep breath. There is no clinical evidence for 

pneumonia.  Plan for chest x-ray, EKG, BMP, CBC, and D-dimer. 





Chest x-ray has chronic findings, with an elevated left hemidiaphragm, nothing 

acute visualized. 





D-dimer is elevated at 6.48. Plan for CT chest to evaluate for PE. 





CT report was called to me by the radiologist. Results: negative for PE.  In 

addition there is no evidence of pneumonia.  The results were discussed with 

the patient.  He would like to go home.  I have instructed him to take his 

medications as prescribed, including diuretics.  I do not feel that he needs 

paracentesis at this time.  However, if he develops increasing shortness of 

breath, or hypoxia, I would consider paracentesis.  Blood pressure has been 

stable throughout his emergency department stay.  Plan to discharge home and 

have patient followup with his PCP. 


Differential Diagnosis: 





Differential diagnosis includes though it is not limited to pneumonia, 

pneumothorax, pulmonary embolism, aortic dissection, pericarditis, acute 

coronary syndrome.








- Data Points


Laboratory Results: 


 Laboratory Results





 05/25/17 16:25 





 05/25/17 16:25 











Departure





- Departure


Disposition: Home, Routine, Self-Care


Clinical Impression: 


Dyspnea


Qualifiers:


 Dyspnea type: dyspnea on exertion Qualified Code(s): R06.09 - Other forms of 

dyspnea





Condition: Good


Instructions:  Dyspnea (ED)


Additional Instructions: 


Followup with your primary care physician on Monday. Return to the Emergency 

Department with new or worsening symptoms. 


Referrals: 


Inessa Vargas,  [Primary Care Provider] - As per Instructions


Report Scribed for: Charley Ruiz


Report Scribed by: Alexandra Gundersen


Date of Report: 05/25/17


Time of Report: 16:24


Physician Review and Approval Statement: 





05/25/17 16:24


Portions of this note were transcribed by a medical scribe. I personally 

performed the history, physical exam, and medical decision-making; and 

confirmed the accuracy of the information in the transcribed note.

## 2017-06-01 NOTE — EDPHY
H & P


HPI/ROS: 





HPI





CHIEF COMPLAINT:  Generalized weakness, abdominal distension, falling at home





HISTORY OF PRESENT ILLNESS:  This patient 56-year-old male he does have 

significant past medical history for alcoholism, liver cirrhosis, presents 

emergency room by EMS after he is too weak to take care of himself.  States 

that he has generalized weakness he has been falling at home he also tells me 

that his abdomen is more distended than normal. He denies chest pain or 

shortness of breath. He does report to me feels lightheaded.  Denies recent 

fever.  He also tells me he is having large amounts of diarrhea.





Past Medical History:  Alcoholism, liver cirrhosis, C diff





Past Surgical History:  Right lower extremity surgery from a wound. 





Social History:  Daily drinker, drinks a pt of alcohol daily denies drugs or 

tobacco





Family History:  Noncontributory








ROS   


REVIEW OF SYSTEMS:


A comprehensive 10 point review of systems is otherwise negative aside from 

elements mentioned in the history of present illness.








Exam   


Constitutional   triage nursing summary reviewed, vital signs reviewed, awake/

alert. 


Eyes   icteric eyes, normal conjunctivae and sclera, EOMI, PERRLA. 


HENT   normal inspection, atraumatic, moist mucus membranes, no epistaxis, neck 

supple/ no meningismus, no raccoon eyes. 


Respiratory   clear to auscultation bilaterally, normal breath sounds, no 

respiratory distress, no wheezing. 


Cardiovascular   rate normal, regular rhythm, no murmur, no edema, distal 

pulses normal. 


Gastrointestinal   abdomen is distended with a fluid wave, soft, non-tender, no 

rebound, no guarding, normal bowel sounds, no distension, no pulsatile mass. 


Genitourinary   no CVA tenderness. 


Musculoskeletal  no midline vertebral tenderness, full range of motion, no calf 

swelling, no tenderness of extremities, no meningismus, good pulses, 

neurovascularly intact.


Skin  jaundice pink, warm, & dry, no rash, skin atraumatic. 


Neurologic   awake, alert and oriented x 3, AAOx3, moves all 4 extremities 

equally, motor intact, sensory intact, CN II-XII intact, normal cerebellar, 

normal vision, normal speech. 


Psychiatric   normal mood/affect. 


Heme/Lymph/Immune   no lymphadenopathy.





Differential Diagnosis:  Includes but is not limited to in a particular order, 

hepatorenal syndrome, worsening liver failure, electrolyte disturbance, 

dehydration, failure to thrive, generalized weakness, infection, sepsis





Medical Decision Making:  Plan for this patient IV establishment, blood work, 

ultrasound of the abdomen, chest x-ray, blood cultures obtained lactic acid 

also ammonia level.





Re-evaluation:








ED x-ray chest one view elevated left hemidiaphragm.  No focal pneumonia.





Ultrasound of the abdomen  The results of the study are shows hepatomegaly, 

portal hypertension, large amount of ascites.  I discussed the results of this 

study with the radiologist Dr. Hathaway. 





2345:  Re-evaluation at this time patient is resting comfortably.  Nontoxic 

appearing.  However has stigma of liver cirrhosis.  Nontender abdominal exam.  

There is no evidence that he is septic.  Blood cultures been pulled.  Lactic 

acid is elevated 2.4 probably due to dehydration hepatorenal syndrome.  

Increasing BUN and creatinine.  Elevated ammonia level.  Patient need to be 

admitted the hospital for hepatorenal syndrome.  Elevated ammonia level.  

Worsening liver cirrhosis.  Dehydration also has worsening liver cirrhosis and 

ascites no indication for emergent paracentesis at this time.  He is not in any 

respiratory distress and his abdomen is not tender.


Source: Patient, EMS





- Personal History


Tetanus Vaccine Date: 2013





- Medical/Surgical History


Hx Asthma: No


Hx Chronic Respiratory Disease: No


Hx Diabetes: No


Hx Cardiac Disease: Yes


Hx Renal Disease: No


Hx Cirrhosis: No


Hx Alcoholism: Yes


Hx HIV/AIDS: No


Hx Splenectomy or Spleen Trauma: No


Other PMH: necrotizing fascitis RLE 2014, HTN, Depression, ETOH





- Social History


Smoking Status: Never smoked


Constitutional: 


 Initial Vital Signs











Temperature (C)  36.5 C   06/01/17 22:18


 


Heart Rate  107 H  06/01/17 22:18


 


Respiratory Rate  24 H  06/01/17 22:18


 


Blood Pressure  124/92 H  06/01/17 22:18


 


O2 Sat (%)  94   06/01/17 22:18








 











O2 Delivery Mode               Room Air














Allergies/Adverse Reactions: 


 





No Known Allergies Allergy (Verified 05/09/17 16:11)


 








Home Medications: 














 Medication  Instructions  Recorded


 


Gabapentin [Neurontin 300 MG (*)] 1,200 mg PO TID PRN 05/10/17


 


Ibuprofen [Motrin (*)] 800 mg PO TIDMEAL PRN 05/10/17


 


Metoprolol Tartrate [Lopressor 25 25 mg PO DAILY 05/10/17





mg (*)]  


 


Triamcinolone 0.1% [Triamcinolone 1 elizabeth TP BID PRN 05/10/17





0.1% Cream (*)]  


 


Thiamine HCl [Vitamin B-1] 100 mg PO DAILY #0 tab 05/14/17


 


Vancomycin [Vancocin Oral Liquid] 125 mg PO QID #40 udl 05/14/17


 


oxyCODONE IR [Oxycodone Ir (*)] 2.5 - 5 mg PO Q6 PRN #25 tab 05/14/17














Medical Decision Making





- Diagnostics


Imaging Results: 


 Imaging Impressions





Abdomen Ultrasound  06/01/17 22:26


Impression: 


1. Hepatomegaly and features of hepatitis and hepatic steatosis.


2. Reversed main portal vein indicates portal venous hypertension and 

underlying cirrhosis.


3. Small volume ascites.


4. Gallbladder sludge similar to 4 weeks prior. No biliary dilation.


 


Findings discussed with Emergency Department physician, Derrick Roth MD  at

  6/1/2017 23:42.


 








Chest X-Ray  06/01/17 22:29


Impression:  Left basilar atelectasis and asymmetric left hemidiaphragm 

elevation unchanged.














- Data Points


Laboratory Results: 


 Laboratory Results





 06/01/17 22:35 





 06/01/17 22:35 





 











  06/01/17 06/01/17 06/01/17





  22:35 22:35 22:35


 


WBC      





    


 


RBC      





    


 


Hgb      





    


 


Hct      





    


 


MCV      





    


 


MCH      





    


 


MCHC      





    


 


RDW      





    


 


Plt Count      





    


 


MPV      





    


 


Neut % (Auto)      





    


 


Lymph % (Auto)      





    


 


Mono % (Auto)      





    


 


Eos % (Auto)      





    


 


Baso % (Auto)      





    


 


Nucleat RBC Rel Count      





    


 


Absolute Neuts (auto)      





    


 


Absolute Lymphs (auto)      





    


 


Absolute Monos (auto)      





    


 


Absolute Eos (auto)      





    


 


Absolute Basos (auto)      





    


 


Absolute Nucleated RBC      





    


 


Immature Gran %      





    


 


Immature Gran #      





    


 


PT      





    


 


INR      





    


 


APTT      





    


 


VBG Lactic Acid      





    


 


Sodium      135 mEq/L mEq/L





     (134-144) 


 


Potassium      3.4 mEq/L L mEq/L





     (3.5-5.2) 


 


Chloride      99 mEq/L mEq/L





     () 


 


Carbon Dioxide      23 mEq/l mEq/l





     (22-31) 


 


Anion Gap      13 mEq/L mEq/L





     (8-16) 


 


BUN      24 mg/dL H mg/dL





     (7-23) 


 


Creatinine      2.0 mg/dL H mg/dL





     (0.7-1.3) 


 


Estimated GFR      34 





    


 


Glucose      111 mg/dL H mg/dL





     () 


 


Calcium      8.4 mg/dL L mg/dL





     (8.5-10.4) 


 


Total Bilirubin      6.5 mg/dL H mg/dL





     (0.1-1.4) 


 


Conjugated Bilirubin      5.3 mg/dL H mg/dL





     (0.0-0.5) 


 


Unconjugated Bilirubin      1.2 mg/dL H mg/dL





     (0.0-1.1) 


 


AST      103 IU/L H IU/L





     (17-59) 


 


ALT      34 IU/L IU/L





     (21-72) 


 


Alkaline Phosphatase      119 IU/L IU/L





     () 


 


Ammonia    73.0 uMOL/L H uMOL/L  





    (9.0-30.0)  


 


Troponin I      < 0.012 ng/mL ng/mL





     (0-0.034) 


 


Total Protein      7.5 g/dL g/dL





     (6.3-8.2) 


 


Albumin      2.6 g/dL L g/dL





     (3.5-5.0) 


 


Lipase      140.0 IU/L IU/L





     () 


 


Ethyl Alcohol  < 10 mg/dL mg/dL    





   (0-10)   














  06/01/17 06/01/17 06/01/17





  22:35 22:35 22:35


 


WBC    8.23 10^3/uL 10^3/uL  





    (3.80-9.50)  


 


RBC    3.28 10^6/uL L 10^6/uL  





    (4.40-6.38)  


 


Hgb    12.2 g/dL L g/dL  





    (13.7-17.5)  


 


Hct    34.2 % L %  





    (40.0-51.0)  


 


MCV    104.3 fL H fL  





    (81.5-99.8)  


 


MCH    37.2 pg H pg  





    (27.9-34.1)  


 


MCHC    35.7 g/dL g/dL  





    (32.4-36.7)  


 


RDW    17.2 % H %  





    (11.5-15.2)  


 


Plt Count    198 10^3/uL 10^3/uL  





    (150-400)  


 


MPV    11.6 fL fL  





    (8.7-11.7)  


 


Neut % (Auto)    71.5 % %  





    (39.3-74.2)  


 


Lymph % (Auto)    15.2 % %  





    (15.0-45.0)  


 


Mono % (Auto)    10.4 % %  





    (4.5-13.0)  


 


Eos % (Auto)    2.3 % %  





    (0.6-7.6)  


 


Baso % (Auto)    0.4 % %  





    (0.3-1.7)  


 


Nucleat RBC Rel Count    0.0 % %  





    (0.0-0.2)  


 


Absolute Neuts (auto)    5.88 10^3/uL 10^3/uL  





    (1.70-6.50)  


 


Absolute Lymphs (auto)    1.25 10^3/uL 10^3/uL  





    (1.00-3.00)  


 


Absolute Monos (auto)    0.86 10^3/uL H 10^3/uL  





    (0.30-0.80)  


 


Absolute Eos (auto)    0.19 10^3/uL 10^3/uL  





    (0.03-0.40)  


 


Absolute Basos (auto)    0.03 10^3/uL 10^3/uL  





    (0.02-0.10)  


 


Absolute Nucleated RBC    0.00 10^3/uL 10^3/uL  





    (0-0.01)  


 


Immature Gran %    0.2 % %  





    (0.0-1.1)  


 


Immature Gran #    0.02 10^3/uL 10^3/uL  





    (0.00-0.10)  


 


PT  21.5 SEC H SEC    





   (12.0-15.0)   


 


INR  1.86  H     





   (0.83-1.16)   


 


APTT  40.7 SEC H SEC    





   (23.0-38.0)   


 


VBG Lactic Acid      2.4 mmol/L H mmol/L





     (0.7-2.1) 


 


Sodium      





    


 


Potassium      





    


 


Chloride      





    


 


Carbon Dioxide      





    


 


Anion Gap      





    


 


BUN      





    


 


Creatinine      





    


 


Estimated GFR      





    


 


Glucose      





    


 


Calcium      





    


 


Total Bilirubin      





    


 


Conjugated Bilirubin      





    


 


Unconjugated Bilirubin      





    


 


AST      





    


 


ALT      





    


 


Alkaline Phosphatase      





    


 


Ammonia      





    


 


Troponin I      





    


 


Total Protein      





    


 


Albumin      





    


 


Lipase      





    


 


Ethyl Alcohol      





    











Medications Given: 


 








Discontinued Medications





Hydromorphone HCl (Dilaudid)  0.5 mg IVP EDNOW ONE


   Stop: 06/01/17 22:27


   Last Admin: 06/01/17 23:16 Dose:  0.5 mg


Sodium Chloride (Ns)  1,000 mls @ 0 mls/hr IV ONCE ONE


   PRN Reason: Wide Open


   Stop: 06/01/17 22:27


   Last Admin: 06/01/17 23:17 Dose:  1,000 mls


Ondansetron HCl (Zofran)  4 mg IVP EDNOW ONE


   Stop: 06/01/17 22:27


   Last Admin: 06/01/17 23:18 Dose:  4 mg








Departure





- Departure


Disposition: [facility]


Clinical Impression: 


 Hepatorenal failure, Hyperammonemia





Liver cirrhosis


Qualifiers:


 Hepatic cirrhosis type: alcoholic cirrhosis Ascites presence: with ascites 

Qualified Code(s): K70.31 - Alcoholic cirrhosis of liver with ascites





Condition: Fair


Referrals: 


Inessa Vargas DO [Primary Care Provider] - As per Instructions

## 2017-06-01 NOTE — CPEKG
Heart Rate: 101

RR Interval: 594

P-R Interval: 128

QRSD Interval: 140

QT Interval: 416

QTC Interval: 540

P Axis: 77

QRS Axis: 45

T Wave Axis: 20

EKG Severity - ABNORMAL ECG -

EKG Impression: SINUS TACHYCARDIA

EKG Impression: RIGHT BUNDLE BRANCH BLOCK

Electronically Signed By: Derrick Roth 02-Jun-2017 07:11:53

## 2017-06-02 NOTE — HOSPPROG
Hospitalist Progress Note


Assessment/Plan: 





Patient is a 56 year old male with cirrhosis, chronic ETOH use, chronic HCV 

infection and depression who presents to the Ed with complaint of generalized 

weakness, resulting in mechanical fall today. ED reveals evidence of hepatitis, 

hyperammonemia, hyperbilirubinemia.


Plan: 





# generalized weakness


Patient describes a slow progression of this generalized weakness. He denies 

any obvious focal symptoms and is nonfocal on exam today. Generalized weakness 

is likely multifactorial, related his chronic hepatitis, early hepatic 

encephalopathy, possible infection. Will need to r/o infection with urine 

studies as well as diagnostic paracentesis. Will also obtain PT/OT evaluation 

and maintain fall precautions.





# chronic hepatitis, cirrhosis


Patient with AST/ALT near previous baseline, but direct bilirubin is slightly 

elevated from previous levels. Ammonia is also elevated, but patient is 

mentating well. Abd US shows evidence of chronic cirrhosis with portal 

hypertension, chronic biliary sludge, but no obvious obstruction within the 

biliary tree. MELD score today is < 32, so will not initiate steroids at this 

time. Given patient has not had BM today, will initiate lactulose therapy with 

goal 1-2 BM/day.





# SIRS, ? SBP


On presentation met SIRS criteria with tachycardia and tachypnea. Although he 

is afebrile and without leukocytosis, he did report some diffuse abdominal 

tenderness on my exam, concerning for SBP give his US findings of portal 

hypertension and ascites. Will plan for IR-guided diagnostic paracentesis, 

initiate empiric IV ceftriaxone for SBP coverage. Will also check UA to rule 

out urinary source.





# acute on chronic renal failure


Etiology of increased BUN/Cr is not clear at this time; may be related to 

dehydration/pre-renal vs ATN due to sepsis vs hepatorenal syndrome, given 

hypoalbuminemia. Will check UA, urine Na, Cr to determine FeNa. Will also give 

albumin for volume resuscitation and continue to trend BMP, monitor I/Os 

closely. If continues to worsen, consider nephrology consultation. 





# chronic ETOH use


Patient reports he wants to decrease his ETOH use and had his last drink 2 days 

ago. ETOH level on presentation is negative. He does not have obvious signs of 

withdrawal, but will place on CIWA protocol, provide Ativan prn and supplement 

thiamine, folate, mvn.





# dispo: admit to inpatient service for likely > 2MN stay





# gen;


regular diet


DVT ppx: HSQ given renal failure


Full code


Subjective: Feeling ok. Still weak.


Objective: 


 Vital Signs











Temp Pulse Resp BP Pulse Ox


 


 36.6 C   78   16   116/88 H  95 


 


 06/02/17 10:58  06/02/17 13:02  06/02/17 10:58  06/02/17 13:02  06/02/17 10:58








 Microbiology











 06/02/17 00:31 Gram Stain - Final





 Peritoneal Fluid - Aspirate 








 Laboratory Results





 06/02/17 04:30 





 06/02/17 04:30 





 











 06/01/17 06/02/17 06/03/17





 05:59 05:59 05:59


 


Intake Total  1200 


 


Output Total  125 


 


Balance  1075 








 











PT  22.5 SEC (12.0-15.0)  H  06/02/17  04:30    


 


INR  1.97  (0.83-1.16)  H  06/02/17  04:30    














- Physical Exam


Constitutional: appears nourished, chronically ill appearing


Eyes: PERRL, anicteric sclera


Ears, Nose, Mouth, Throat: moist mucous membranes, hearing normal


Cardiovascular: edema, No JVD


Respiratory: no respiratory distress, reduced air movement


Gastrointestinal: ascites, No tenderness


Skin: warm


Musculoskeletal: generalized weakness


Psychiatric: not anxious, poor insight, poor judgement, poor memory





ICD10 Worksheet


Patient Problems: 


 Problems











Problem Status Onset


 


Dyspnea Acute  


 


Liver cirrhosis Acute  


 


Hepatorenal failure Acute  


 


Hyperammonemia Acute  


 


C. difficile diarrhea Acute  ~05/12/17


 


Alcohol dependence Active  


 


Kidney disease Active  


 


Cellulitis Active  


 


Necrotizing fasciitis Active  


 


Acute renal failure Acute  


 


Squamous cell carcinoma Acute  


 


Alcoholism Chronic  


 


Gait instability Chronic  


 


Squamous cell cancer of skin of right shoulder Acute  


 


Hyperkalemia Acute  


 


Low back pain Chronic  


 


Hepatitis C Chronic  


 


Edema extremities Acute  


 


HTN (hypertension) Acute  


 


MRSA (methicillin resistant staph aureus) culture positive Acute  07/21/14


 


Pneumonia Acute  


 


Hyperbilirubinemia Acute  


 


Weakness Acute

## 2017-06-02 NOTE — PDGENHP
History and Physical





- Chief Complaint


weakness





- History of Present Illness


 patient is a 48-year-old male with liver cirrhosis, due to chronic alcoholic 

and chronic HCV hepatitis,  complicated by portal hypertension, history of 

depression who presents to the ED with complaint of generalized weakness and 

fall. patient currently lives  alone in a transitional housing unit and states 

for the past week or so he has felt increasing generalized weakness.  Over the 

past 2 or 3 days, he has also been having difficulty walking due to this 

progressive generalized weakness. he also reports feeling lightheaded, 

especially when moving from a sitting to standing position.  Today he had 

gotten up from his chair to go to the bathroom when he fell forward on his 

bathroom floor landing on his knees and then was unable to get himself back up.

  He then crawled to his phone and called EMS and was brought to the ED.   He 

denies focal weakness, numbness or tingling.  He does report decreased p.o. 

intake over the past several days, but denies any recent fevers, chills, chest 

pain, palpitations, shortness of breath or headache.  he does admit some mild 

diffuse abdominal pain, and recently had profuse watery diarrhea, but this has 

resolved over the past 1-2 days.  Last BM was yesterday and solid.





Patient was admitted about 1 month ago to [facility] for similar 

symptoms of generalized weakness.  The hospitalization was complicated by a C 

diff infection which was treated with antibiotic therapy.  He was evaluated by 

PT OT during that admission was deemed safe to go back to his transition 

housing.





  On arrival to the ED today, patient is afebrile and hemodynamically stable.  

Labs reveal no leukocytosis, but hyperbilirubinemia and elevated ammonia level 

as well as elevated BUN and creatinine.  Abdominal ultrasound reveals evidence 

of cirrhosis as well as small amount of ascites.  He was then admitted to the 

hospitalist service for further management.





History Information





- Allergies/Home Medication List


Allergies/Adverse Reactions: 








No Known Allergies Allergy (Verified 05/09/17 16:11)


 





Home Medications: 








Gabapentin [Neurontin 300 MG (*)] 1,200 mg PO TID PRN 05/10/17 [Last Taken 

Unknown]


Ibuprofen [Motrin (*)] 800 mg PO TIDMEAL PRN 05/10/17 [Last Taken Unknown]


Metoprolol Tartrate [Lopressor 25 mg (*)] 25 mg PO DAILY 05/10/17 [Last Taken 

Unknown]


Triamcinolone 0.1% [Triamcinolone 0.1% Cream (*)] 1 elizabeth TP BID PRN 05/10/17 [

Last Taken Unknown]





I have personally reviewed and updated: family history, medical history, social 

history, surgical history





- Past Medical History


hypertension


Additional medical history: Alcoholism.  Hepatitis-C virus.  Cirrhosis.  h/o 

Necrotizing fasciitis right lower extremity.  Depression with previous 

hospitalizations in behavioral health





- Surgical History


Reports: no pertinent surgical hx





- Family History


Additional family history: Mother with Alzheimer's dementia and major 

depressive disorder





- Social History


Smoking Status: Never smoked


Alcohol Use: Heavy (drinks about 1 pint per day)


Drug Use: None


Additional social history: lives in transitional housing [facility]





Review of Systems


ROS: 10pt was reviewed & negative except for what was stated in HPI & below





Physical Exam














Temp Pulse Resp BP Pulse Ox


 


 36.5 C   103 H  16   121/78 H  93 


 


 06/02/17 04:00  06/02/17 04:00  06/02/17 04:00  06/02/17 04:00  06/02/17 04:00




















O2 (L/minute)                  0














Constitutional: no apparent distress, not in pain, chronically ill appearing, 

obese


Eyes: PERRL, EOMI, icteric sclera


Ears, Nose, Mouth, Throat: hearing normal, ears appear normal, no oral mucosal 

ulcers, dry mucous membranes


Cardiovascular: regular rate and rhythym, no murmur, rub, or gallop, pulses 

symmetric bilaterally, edema (trace LE edema), No JVD


Peripheral Pulses: 2+: dorsalis-pedis (R), dorsalis-pedis (L)


Respiratory: no respiratory distress, no rales or rhonchi, clear to auscultation


Gastrointestinal: normoactive bowel sounds, no palpable masses, ascites, 

distension (distended, mildly tender diffusely), No guarding, No rebound


Genitourinary: no bladder fullness, no bladder tenderness


Skin: warm, normal color, no induration, other (bruising on bilateral knees; 

old scars of lower leg)


Musculoskeletal: full muscle strength, no muscle tenderness, normal joint ROM, 

no joint effusions


Neurologic: AAOx3, sensation intact bilaterally, asterixes (slight ), CN II-XII 

Intact, No weakness, No numbness, No facial droop


Psychiatric: interacting appropriately, not anxious, not encephalopathic, 

thought process linear





Lab Data & Imaging Review





 06/02/17 04:30





 06/01/17 22:35














WBC  8.23 10^3/uL (3.80-9.50)   06/01/17  22:35    


 


RBC  3.28 10^6/uL (4.40-6.38)  L  06/01/17  22:35    


 


Hgb  12.2 g/dL (13.7-17.5)  L  06/01/17  22:35    


 


Hct  34.2 % (40.0-51.0)  L  06/01/17  22:35    


 


MCV  104.3 fL (81.5-99.8)  H  06/01/17  22:35    


 


MCH  37.2 pg (27.9-34.1)  H  06/01/17  22:35    


 


MCHC  35.7 g/dL (32.4-36.7)   06/01/17  22:35    


 


RDW  17.2 % (11.5-15.2)  H  06/01/17  22:35    


 


Plt Count  198 10^3/uL (150-400)   06/01/17  22:35    


 


MPV  11.6 fL (8.7-11.7)   06/01/17  22:35    


 


Neut % (Auto)  71.5 % (39.3-74.2)   06/01/17  22:35    


 


Lymph % (Auto)  15.2 % (15.0-45.0)   06/01/17  22:35    


 


Mono % (Auto)  10.4 % (4.5-13.0)   06/01/17  22:35    


 


Eos % (Auto)  2.3 % (0.6-7.6)   06/01/17  22:35    


 


Baso % (Auto)  0.4 % (0.3-1.7)   06/01/17  22:35    


 


Nucleat RBC Rel Count  0.0 % (0.0-0.2)   06/01/17  22:35    


 


Absolute Neuts (auto)  5.88 10^3/uL (1.70-6.50)   06/01/17  22:35    


 


Absolute Lymphs (auto)  1.25 10^3/uL (1.00-3.00)   06/01/17  22:35    


 


Absolute Monos (auto)  0.86 10^3/uL (0.30-0.80)  H  06/01/17  22:35    


 


Absolute Eos (auto)  0.19 10^3/uL (0.03-0.40)   06/01/17  22:35    


 


Absolute Basos (auto)  0.03 10^3/uL (0.02-0.10)   06/01/17  22:35    


 


Absolute Nucleated RBC  0.00 10^3/uL (0-0.01)   06/01/17  22:35    


 


Immature Gran %  0.2 % (0.0-1.1)   06/01/17  22:35    


 


Immature Gran #  0.02 10^3/uL (0.00-0.10)   06/01/17  22:35    


 


PT  21.5 SEC (12.0-15.0)  H  06/01/17  22:35    


 


INR  1.86  (0.83-1.16)  H  06/01/17  22:35    


 


APTT  40.7 SEC (23.0-38.0)  H  06/01/17  22:35    


 


VBG Lactic Acid  2.1 mmol/L (0.7-2.1)   06/02/17  00:20    


 


Sodium  135 mEq/L (134-144)   06/01/17  22:35    


 


Potassium  3.4 mEq/L (3.5-5.2)  L  06/01/17  22:35    


 


Chloride  99 mEq/L ()   06/01/17  22:35    


 


Carbon Dioxide  23 mEq/l (22-31)   06/01/17  22:35    


 


Anion Gap  13 mEq/L (8-16)   06/01/17  22:35    


 


BUN  24 mg/dL (7-23)  H  06/01/17  22:35    


 


Creatinine  2.0 mg/dL (0.7-1.3)  H  06/01/17  22:35    


 


Estimated GFR  34   06/01/17  22:35    


 


Glucose  111 mg/dL ()  H  06/01/17  22:35    


 


Calcium  8.4 mg/dL (8.5-10.4)  L  06/01/17  22:35    


 


Total Bilirubin  6.5 mg/dL (0.1-1.4)  H  06/01/17  22:35    


 


Conjugated Bilirubin  5.3 mg/dL (0.0-0.5)  H  06/01/17  22:35    


 


Unconjugated Bilirubin  1.2 mg/dL (0.0-1.1)  H  06/01/17  22:35    


 


AST  103 IU/L (17-59)  H  06/01/17  22:35    


 


ALT  34 IU/L (21-72)   06/01/17  22:35    


 


Alkaline Phosphatase  119 IU/L ()   06/01/17  22:35    


 


Ammonia  73.0 uMOL/L (9.0-30.0)  H  06/01/17  22:35    


 


Troponin I  < 0.012 ng/mL (0-0.034)   06/01/17  22:35    


 


Total Protein  7.5 g/dL (6.3-8.2)   06/01/17  22:35    


 


Albumin  2.6 g/dL (3.5-5.0)  L  06/01/17  22:35    


 


Lipase  140.0 IU/L ()   06/01/17  22:35    


 


Ethyl Alcohol  < 10 mg/dL (0-10)   06/01/17  22:35    








Visualized and Interpreted Chest x-ray results: Yes


Chest X-Ray results: no infiltrate


Visualized and Interpreted imaging results: Yes


Interpretation: Abd US: evidence of portal hypertension and cirrhosis, small 

amount of ascites in RLQ


Visualized and Interpreted EKG results: Yes


EKG Interpretation: Positive for: normal sinsus rhythm, right bundle branch 

block





Assessment & Plan


Assessment: 





Patient is a 56 year old male with cirrhosis, chronic ETOH use, chronic HCV 

infection and depression who presents to the Ed with complaint of generalized 

weakness, resulting in mechanical fall today. ED reveals evidence of hepatitis, 

hyperammonemia, hyperbilirubinemia.


Plan: 





# generalized weakness


Patient describes a slow progression of this generalized weakness. He denies 

any obvious focal symptoms and is nonfocal on exam today. Generalized weakness 

is likely multifactorial, related his chronic hepatitis, early hepatic 

encephalopathy, possible infection. Will need to r/o infection with urine 

studies as well as diagnostic paracentesis. Will also obtain PT/OT evaluation 

and maintain fall precautions.





# chronic hepatitis, cirrhosis


Patient with AST/ALT near previous baseline, but direct bilirubin is slightly 

elevated from previous levels. Ammonia is also elevated, but patient is 

mentating well. Abd US shows evidence of chronic cirrhosis with portal 

hypertension, chronic biliary sludge, but no obvious obstruction within the 

biliary tree. MELD score today is < 32, so will not initiate steroids at this 

time. Given patient has not had BM today, will initiate lactulose therapy with 

goal 1-2 BM/day.





# SIRS, ? SBP


On presentation met SIRS criteria with tachycardia and tachypnea. Although he 

is afebrile and without leukocytosis, he did report some diffuse abdominal 

tenderness on my exam, concerning for SBP give his US findings of portal 

hypertension and ascites. Will plan for IR-guided diagnostic paracentesis, 

initiate empiric IV ceftriaxone for SBP coverage. Will also check UA to rule 

out urinary source.





# acute on chronic renal failure


Etiology of increased BUN/Cr is not clear at this time; may be related to 

dehydration/pre-renal vs ATN due to sepsis vs hepatorenal syndrome, given 

hypoalbuminemia. Will check UA, urine Na, Cr to determine FeNa. Will also give 

albumin for volume resuscitation and continue to trend BMP, monitor I/Os 

closely. If continues to worsen, consider nephrology consultation. 





# chronic ETOH use


Patient reports he wants to decrease his ETOH use and had his last drink 2 days 

ago. ETOH level on presentation is negative. He does not have obvious signs of 

withdrawal on my assessment, but will place on CIWA protocol, provide Ativan 

prn and supplement thiamine, folate, mvn.





# dispo: admit to inpatient service for likely > 2MN stay





# gen;


regular diet


DVT ppx: HSQ given renal failure


Full code

## 2017-06-03 NOTE — HOSPPROG
Hospitalist Progress Note


Assessment/Plan: 





Patient is a 56 year old male with cirrhosis, chronic ETOH use, chronic HCV 

infection and depression who presented to ED 6/1 with complaint of generalized 

weakness, resulting in mechanical fall today. ED reveals evidence of hepatitis, 

hyperammonemia, hyperbilirubinemia.





Plan: 





# generalized weakness


   resulting in fall/inability to ambulate safely


   likely result of chronic hepatitis and early hepatic encephalopathy


   no SBP or UTI


   PT/OT consults





# chronic hepatitis, cirrhosis


   Patient with AST/ALT near previous baseline, but direct bilirubin is 

slightly elevated from previous levels. 


   Ammonia is also elevated, but patient is mentating well. 


   Abd US shows evidence of chronic cirrhosis with portal hypertension, chronic 

biliary sludge, but no obvious obstruction within the biliary tree. 


   MELD score is still < 32, so will not initiate steroids at this time. 


   Started on lactulose therapy with goal 1-2 BM/day.





# SIRS


   SBP and UTI ruled out


   On presentation met SIRS criteria with tachycardia and tachypnea


   Now resolved





# acute on chronic renal failure


   likely related to dehydration/pre-renal vs ATN vs hepatorenal syndrome, 

given hypoalbuminemia. 


   albumin given and Cr improving


   FeNa suggestive of hypovolemia


   hold Ibuprofen





# chronic ETOH use


   Patient reports he wants to decrease his ETOH use and had his last drink 2 

days ago. 


   ETOH level on presentation is negative. 


   Placed on CIWA protocol, provide Ativan prn and supplement thiamine, folate, 

mvn.





# dispo: admit to inpatient service for likely > 2MN stay





# gen;


regular diet


DVT ppx: HSQ given renal failure


Full code


Subjective: Reports ongoing weakness. No focal symptoms currently.


Objective: 


 Vital Signs











Temp Pulse Resp BP Pulse Ox


 


 97.8 F   77   18   96/68 L  95 


 


 06/03/17 08:00  06/03/17 08:00  06/03/17 08:00  06/03/17 08:00  06/03/17 08:00








 Microbiology











 06/02/17 00:31 Gram Stain - Final





 Peritoneal Fluid - Aspirate 








 Laboratory Results





 06/02/17 04:30 





 06/03/17 04:47 





 











 06/02/17 06/03/17 06/04/17





 05:59 05:59 05:59


 


Intake Total 1200 550 


 


Output Total 125 400 300


 


Balance 1075 150 -300








 











PT  22.5 SEC (12.0-15.0)  H  06/02/17  04:30    


 


INR  1.97  (0.83-1.16)  H  06/02/17  04:30    














- Physical Exam


Constitutional: chronically ill appearing


Eyes: icteric sclera, No pale conjunctiva


Ears, Nose, Mouth, Throat: moist mucous membranes, hearing normal


Cardiovascular: regular rate and rhythym, no murmur, rub, or gallop


Respiratory: no respiratory distress, reduced air movement


Gastrointestinal: ascites, other (+S)


Psychiatric: interacting appropriately, not anxious





ICD10 Worksheet


Patient Problems: 


 Problems











Problem Status Onset


 


Dyspnea Acute  


 


Liver cirrhosis Acute  


 


Hepatorenal failure Acute  


 


Hyperammonemia Acute  


 


C. difficile diarrhea Acute  ~05/12/17


 


Alcohol dependence Active  


 


Kidney disease Active  


 


Cellulitis Active  


 


Necrotizing fasciitis Active  


 


Acute renal failure Acute  


 


Squamous cell carcinoma Acute  


 


Alcoholism Chronic  


 


Gait instability Chronic  


 


Squamous cell cancer of skin of right shoulder Acute  


 


Hyperkalemia Acute  


 


Low back pain Chronic  


 


Hepatitis C Chronic  


 


Edema extremities Acute  


 


HTN (hypertension) Acute  


 


MRSA (methicillin resistant staph aureus) culture positive Acute  07/21/14


 


Pneumonia Acute  


 


Hyperbilirubinemia Acute  


 


Weakness Acute

## 2017-06-03 NOTE — HOSPPROG
Hospitalist Progress Note


Assessment/Plan: 





XC note:  Called by RN for BP 70/49.  Chart reviewed.  Afebrile.  Abd non-tender

, though remains distended per RN.  BCx's ngtd.  Paracentesis yesterday, Cx 

ngtd.  Check stat cbc, lactate, cmp.  He did receive IV Ceftriaxone on admission

, will repeat now to cover while awaiting Cx data.  Bolusing with albumin.  


Objective: 


 Vital Signs











Temp Pulse Resp BP Pulse Ox


 


 36.4 C   75   24 H  70/40 L  93 


 


 06/03/17 20:00  06/03/17 20:00  06/03/17 20:00  06/03/17 20:00  06/03/17 20:00








 Microbiology











 06/02/17 00:31 Gram Stain - Final





 Peritoneal Fluid - Aspirate 








 Laboratory Results





 06/02/17 04:30 





 06/03/17 18:10 





 











 06/02/17 06/03/17 06/04/17





 05:59 05:59 05:59


 


Intake Total 1200 550 


 


Output Total 125 400 300


 


Balance 1075 150 -300








 











PT  22.5 SEC (12.0-15.0)  H  06/02/17  04:30    


 


INR  1.97  (0.83-1.16)  H  06/02/17  04:30    














ICD10 Worksheet


Patient Problems: 


 Problems











Problem Status Onset


 


Hepatorenal failure Acute  


 


Hyperammonemia Acute  


 


Liver cirrhosis Acute  


 


Alcohol dependence Active  


 


Cellulitis Active  


 


Kidney disease Active  


 


Necrotizing fasciitis Active  


 


Acute renal failure Acute  


 


C. difficile diarrhea Acute  ~05/12/17


 


Dyspnea Acute  


 


Edema extremities Acute  


 


HTN (hypertension) Acute  


 


Hyperbilirubinemia Acute  


 


Hyperkalemia Acute  


 


MRSA (methicillin resistant staph aureus) culture positive Acute  07/21/14


 


Pneumonia Acute  


 


Squamous cell cancer of skin of right shoulder Acute  


 


Squamous cell carcinoma Acute  


 


Weakness Acute  


 


Alcoholism Chronic  


 


Gait instability Chronic  


 


Hepatitis C Chronic  


 


Low back pain Chronic

## 2017-06-04 NOTE — HOSPPROG
Hospitalist Progress Note


Assessment/Plan: 





Patient is a 56 year old male with cirrhosis, chronic ETOH use, chronic HCV 

infection and depression who presented to ED 6/1 with complaint of generalized 

weakness, resulting in mechanical fall. ED evaluation revealed evidence of 

hepatitis, hyperammonemia, hyperbilirubinemia.





Plan: 





# generalized weakness


   resulting in fall/inability to ambulate safely


   likely result of chronic hepatitis and early hepatic encephalopathy


   no SBP or UTI


   PT/OT consults/ likely will need SNF rehab versus home care





# chronic hepatitis, cirrhosis


   Patient with AST/ALT near previous baseline, but direct bilirubin is 

slightly elevated from previous levels. 


   Ammonia is also elevated, but patient is mentating well. 


   Abd US shows evidence of chronic cirrhosis with portal hypertension, chronic 

biliary sludge, but no obvious obstruction within the biliary tree. 


   MELD score is still < 32, so will not initiate steroids at this time. 


   on lactulose therapy with goal 1-2 BM/day.





# SIRS


   SBP and UTI ruled out


   On presentation met SIRS criteria with tachycardia and tachypnea


   Hypotensive episode yesterday evening with CBC and CMP unchanged and lactate 

level wnl





# acute on chronic renal failure


   likely related to dehydration/pre-renal vs ATN vs hepatorenal syndrome, 

given hypoalbuminemia. 


   albumin given and Cr improving but still not at b/l


   FeNa suggestive of hypovolemia


   hold Ibuprofen and start Tramadol





# chronic ETOH use


   Patient reports he wants to decrease his ETOH use and had his last drink 2-4 

days before admission


   ETOH level on presentation is negative. 


   Placed on CIWA protocol, provide Ativan prn and supplement thiamine, folate, 

MVI


   will ask CM to help with resources





# dispo: admit to inpatient service for likely > 2MN stay





# gen;


regular diet


DVT ppx: HSQ given renal failure


Full code


Subjective: Reports weakness improving. Having frequent stooling/fecal 

incontinence. Abdominal distention feels like typical baseline.


Objective: 


 Vital Signs











Temp Pulse Resp BP Pulse Ox


 


 98.8 F   73   16   86/60 L  92 


 


 06/04/17 08:00  06/04/17 08:55  06/04/17 08:00  06/04/17 08:55  06/04/17 08:00








 Microbiology











 06/02/17 00:31 Gram Stain - Final





 Peritoneal Fluid - Aspirate 








 Laboratory Results





 06/03/17 22:30 





 06/04/17 05:08 





 











 06/03/17 06/04/17 06/05/17





 05:59 05:59 05:59


 


Intake Total 550  


 


Output Total 400 300 


 


Balance 150 -300 








 











PT  22.5 SEC (12.0-15.0)  H  06/02/17  04:30    


 


INR  1.97  (0.83-1.16)  H  06/02/17  04:30    














- Physical Exam


Constitutional: appears nourished, chronically ill appearing, unkempt


Eyes: anicteric sclera


Cardiovascular: regular rate and rhythym


Respiratory: no respiratory distress, no rales or rhonchi


Gastrointestinal: distension, other (+BS)


Skin: warm, other (jaundiced)


Psychiatric: interacting appropriately, not anxious





ICD10 Worksheet


Patient Problems: 


 Problems











Problem Status Onset


 


Hepatorenal failure Acute  


 


Hyperammonemia Acute  


 


Liver cirrhosis Acute  


 


Alcohol dependence Active  


 


Cellulitis Active  


 


Kidney disease Active  


 


Necrotizing fasciitis Active  


 


Acute renal failure Acute  


 


C. difficile diarrhea Acute  ~05/12/17


 


Dyspnea Acute  


 


Edema extremities Acute  


 


HTN (hypertension) Acute  


 


Hyperbilirubinemia Acute  


 


Hyperkalemia Acute  


 


MRSA (methicillin resistant staph aureus) culture positive Acute  07/21/14


 


Pneumonia Acute  


 


Squamous cell cancer of skin of right shoulder Acute  


 


Squamous cell carcinoma Acute  


 


Weakness Acute  


 


Alcoholism Chronic  


 


Gait instability Chronic  


 


Hepatitis C Chronic  


 


Low back pain Chronic

## 2017-06-04 NOTE — WOCRNPDOC
[facility] Advanced Assessment Note





- Skin Integrity Problem, Advanced Assess





  ** Generalized Upper Body and Extremities Scabbing 


Dressing Type: Open to Air


Exudate Amount: Scant


Exudate Characteristic(s): Serous


Integumentary Issue Intervention: Barrier Cream Applied (offered Calazime 

protectant)


[place] Wound Tissue: Erythema, Scaly, Scarred


Wound Bed Constitution: Scab


Site Odor: None


Site Measurement - Head-to-Toe Length X Width X Depth (cm): varying, up to 

approx 0.5 cm melchor


Skin Integrity Problem Comment: Patient well-known to us from previous visits, 

has history of persistent "picking" behavior. Provided him with Calazime 

protectant to serve both as a soothing agent and a cue for alternative self-

nurturing/self-soothing behavior. Requested support of bedside nursing staff to 

encourage patient to use.

## 2017-06-05 NOTE — HOSPPROG
Hospitalist Progress Note


Assessment/Plan: 





Patient is a 56 year old male with cirrhosis, chronic ETOH use, chronic HCV 

infection and depression who presented to ED 6/1 with complaint of generalized 

weakness, resulting in mechanical fall. ED evaluation revealed evidence of 

hepatitis, hyperammonemia, hyperbilirubinemia.





Plan: 





# generalized weakness


   resulting in fall/inability to ambulate safely


   likely result of chronic hepatitis and early hepatic encephalopathy


   no SBP or UTI


   PT/OT consults/ likely will need SNF rehab versus home care





# chronic hepatitis, cirrhosis


   Patient with AST/ALT near previous baseline, but direct bilirubin is 

slightly elevated from previous levels. 


   Ammonia is also elevated, but patient is mentating well. 


   Abd US shows evidence of chronic cirrhosis with portal hypertension, chronic 

biliary sludge, but no obvious obstruction within the biliary tree. 


   MELD score is still < 32, so will not initiate steroids at this time. 


   on lactulose therapy with goal 1-2 BM/day.





# SIRS


   SBP and UTI ruled out


   On presentation met SIRS criteria with tachycardia and tachypnea


   Hypotensive episode yesterday evening with CBC and CMP unchanged and lactate 

level wnl





# acute on chronic renal failure


   likely related to dehydration/pre-renal vs ATN vs hepatorenal syndrome, 

given hypoalbuminemia. 


   albumin given and Cr improving but still not at b/l


   FeNa suggestive of hypovolemia


   hold Ibuprofen and start Tramadol





# chronic ETOH use


   Patient reports he wants to decrease his ETOH use and had his last drink 2-4 

days before admission


   ETOH level on presentation is negative. 


   Placed on CIWA protocol, provide Ativan prn and supplement thiamine, folate, 

MVI


   will ask CM to help with resources





# dispo: admit to inpatient service for likely > 2MN stay





# gen;


regular diet


DVT ppx: HSQ given renal failure


Full code


Subjective: Still feeling very shacky and weak. no pain. Difficulty ambulating.


Objective: 


 Vital Signs











Temp Pulse Resp BP Pulse Ox


 


 36.9 C   78   20   106/53 L  93 


 


 06/04/17 23:05  06/05/17 11:13  06/04/17 23:05  06/05/17 11:13  06/04/17 23:05








 Microbiology











 06/02/17 00:31 Gram Stain - Final





 Peritoneal Fluid - Aspirate Body Fluid Culture - Final








 Laboratory Results





 06/03/17 22:30 





 06/05/17 08:16 





 











 06/04/17 06/05/17 06/06/17





 05:59 05:59 05:59


 


Intake Total  900 


 


Output Total 300  


 


Balance -300 900 








 











PT  22.5 SEC (12.0-15.0)  H  06/02/17  04:30    


 


INR  1.97  (0.83-1.16)  H  06/02/17  04:30    














- Physical Exam


Constitutional: chronically ill appearing, obese, uncomfortable


Eyes: PERRL, EOMI, icteric sclera


Ears, Nose, Mouth, Throat: moist mucous membranes, hearing normal, ears appear 

normal


Cardiovascular: edema, No JVD, No tachycardia


Respiratory: no respiratory distress, no rales or rhonchi, reduced air movement


Gastrointestinal: tenderness, ascites, No guarding


Skin: warm, abrasion, No mottled


Musculoskeletal: no joint effusions, muscular tenderness, abnormal gait, 

generalized weakness


Neurologic: AAOx3


Psychiatric: not encephalopathic, thought process linear, poor insight, poor 

judgement





ICD10 Worksheet


Patient Problems: 


 Problems











Problem Status Onset


 


Dyspnea Acute  


 


Liver cirrhosis Acute  


 


Hepatorenal failure Acute  


 


Hyperammonemia Acute  


 


C. difficile diarrhea Acute  ~05/12/17


 


Alcohol dependence Active  


 


Kidney disease Active  


 


Cellulitis Active  


 


Necrotizing fasciitis Active  


 


Acute renal failure Acute  


 


Squamous cell carcinoma Acute  


 


Alcoholism Chronic  


 


Gait instability Chronic  


 


Squamous cell cancer of skin of right shoulder Acute  


 


Hyperkalemia Acute  


 


Low back pain Chronic  


 


Hepatitis C Chronic  


 


Edema extremities Acute  


 


HTN (hypertension) Acute  


 


MRSA (methicillin resistant staph aureus) culture positive Acute  07/21/14


 


Pneumonia Acute  


 


Hyperbilirubinemia Acute  


 


Weakness Acute

## 2017-06-06 NOTE — HOSPPROG
Hospitalist Progress Note


Assessment/Plan: 





Patient is a 56 year old male with cirrhosis, chronic ETOH use, chronic HCV 

infection and depression who presented to ED 6/1 with complaint of generalized 

weakness, resulting in mechanical fall. ED evaluation revealed evidence of 

hepatitis, hyperammonemia, hyperbilirubinemia.





Plan: 





# generalized weakness


   resulting in fall/inability to ambulate safely


   likely result of chronic hepatitis and early hepatic encephalopathy


   no SBP or UTI


   PT/OT consults/ likely will need SNF rehab versus home care





# chronic hepatitis, cirrhosis


   Patient with AST/ALT near previous baseline, but direct bilirubin is 

slightly elevated from previous levels. 


   Ammonia is also elevated, but patient is mentating well. 


   Abd US shows evidence of chronic cirrhosis with portal hypertension, chronic 

biliary sludge, but no obvious obstruction within the biliary tree. 


   MELD score is still < 32, so will not initiate steroids at this time. 


   on lactulose therapy with goal 1-2 BM/day.





# SIRS


   SBP and UTI ruled out


   On presentation met SIRS criteria with tachycardia and tachypnea





#Hypotension


   multifactorial


   tolerating





# acute on chronic renal failure


   likely related to dehydration/pre-renal vs ATN vs hepatorenal syndrome, 

given hypoalbuminemia. 


   albumin given and Cr improving 


   FeNa suggestive of hypovolemia


   hold Ibuprofen and start Tramadol





# chronic ETOH use


   Patient reports he wants to decrease his ETOH use and had his last drink 2-4 

days before admission


   ETOH level on presentation is negative. 


   Placed on CIWA protocol, provide Ativan prn and supplement thiamine, folate, 

MVI


   will ask CM to help with resources





# dispo: admit to inpatient service for likely > 2MN stay





# gen;


regular diet


DVT ppx: HSQ given renal failure


Full code


Subjective: Not feeling well. Still shaking and weak.


Objective: 


 Vital Signs











Temp Pulse Resp BP Pulse Ox


 


 36.6 C   84   16   85/61 L  92 


 


 06/06/17 07:40  06/06/17 07:40  06/06/17 07:40  06/06/17 07:40  06/06/17 07:40








 Microbiology











 06/02/17 00:31 Gram Stain - Final





 Peritoneal Fluid - Aspirate Body Fluid Culture - Final








 Laboratory Results





 06/03/17 22:30 





 06/06/17 04:55 





 











 06/05/17 06/06/17 06/07/17





 05:59 05:59 05:59


 


Intake Total 900 2100 


 


Balance 900 2100 








 











PT  22.5 SEC (12.0-15.0)  H  06/02/17  04:30    


 


INR  1.97  (0.83-1.16)  H  06/02/17  04:30    














- Physical Exam


Constitutional: chronically ill appearing, obese, uncomfortable


Eyes: PERRL, anicteric sclera, EOMI


Ears, Nose, Mouth, Throat: moist mucous membranes, hearing normal, ears appear 

normal


Cardiovascular: tachycardia, edema, No JVD


Respiratory: no respiratory distress, no rales or rhonchi, reduced air movement


Gastrointestinal: tenderness, ascites, No guarding


Skin: warm, normal color, abrasion


Musculoskeletal: no joint effusions, pain with ROM, generalized weakness


Neurologic: AAOx3


Psychiatric: not anxious, not encephalopathic, poor insight, poor judgement





ICD10 Worksheet


Patient Problems: 


 Problems











Problem Status Onset


 


Dyspnea Acute  


 


Liver cirrhosis Acute  


 


Hepatorenal failure Acute  


 


Hyperammonemia Acute  


 


C. difficile diarrhea Acute  ~05/12/17


 


Alcohol dependence Active  


 


Kidney disease Active  


 


Cellulitis Active  


 


Necrotizing fasciitis Active  


 


Acute renal failure Acute  


 


Squamous cell carcinoma Acute  


 


Alcoholism Chronic  


 


Gait instability Chronic  


 


Squamous cell cancer of skin of right shoulder Acute  


 


Hyperkalemia Acute  


 


Low back pain Chronic  


 


Hepatitis C Chronic  


 


Edema extremities Acute  


 


HTN (hypertension) Acute  


 


MRSA (methicillin resistant staph aureus) culture positive Acute  07/21/14


 


Pneumonia Acute  


 


Hyperbilirubinemia Acute  


 


Weakness Acute

## 2017-06-07 NOTE — HOSPPROG
Hospitalist Progress Note


Assessment/Plan: 





Patient is a 56 year old male with cirrhosis, chronic ETOH use, chronic HCV 

infection and depression who presented to ED 6/1 with complaint of generalized 

weakness, resulting in mechanical fall. ED evaluation revealed evidence of 

hepatitis, hyperammonemia, hyperbilirubinemia.





Plan: 





# generalized weakness


   resulting in fall/inability to ambulate safely


   likely result of chronic hepatitis and early hepatic encephalopathy


   no SBP or UTI


   PT/OT consults/ likely will need SNF rehab 





# chronic hepatitis, cirrhosis


   Patient with AST/ALT near previous baseline, but direct bilirubin is 

slightly elevated from previous levels. 


   Ammonia is also elevated, patient is mentating ok. 


   Abd US shows evidence of chronic cirrhosis with portal hypertension, chronic 

biliary sludge, but no obvious obstruction within the biliary tree. 


   MELD score is still < 32, so will not initiate steroids at this time. 


   on lactulose therapy with goal 1-2 BM/day.


   Paracentesis x2, still significant fluid


   increased lasix to 40mg po Bid


   increased aldactone to 100mg daily





# SIRS


   SBP and UTI ruled out


   On presentation met SIRS criteria with tachycardia and tachypnea





#Hypotension


   multifactorial


   tolerating





# acute on chronic renal failure


   likely related to dehydration/pre-renal vs ATN vs hepatorenal syndrome, 

given hypoalbuminemia. 


   albumin given and Cr improving 


   FeNa suggestive of hypovolemia


   hold Ibuprofen and start Tramadol





# chronic ETOH use


   Patient reports he wants to decrease his ETOH use and had his last drink 2-4 

days before admission


   ETOH level on presentation is negative. 


   Placed on CIWA protocol, provide Ativan prn and supplement thiamine, folate, 

MVI


   will ask CM to help with resources





# dispo: admit to inpatient service for likely > 2MN stay


palliative care consult ordered.


Pt not doing well.


Increased diuretics


follow labs in am


unsafe to DC home


D/W CM, 





# gen;


regular diet


DVT ppx: HSQ given renal failure


Full code


Subjective: Not feeling well today. Throwing up. No pain.


Objective: 


 Vital Signs











Temp Pulse Resp BP Pulse Ox


 


 36.7 C   93   16   134/88 H  95 


 


 06/07/17 11:58  06/07/17 11:58  06/07/17 11:58  06/07/17 11:58  06/07/17 11:58








 Laboratory Results





 06/06/17 14:45 





 06/07/17 05:10 





 











 06/06/17 06/07/17 06/08/17





 05:59 05:59 05:59


 


Intake Total 2100 800 


 


Balance 2100 800 








 











PT  21.3 SEC (12.0-15.0)  H  06/06/17  14:45    


 


INR  1.84  (0.83-1.16)  H  06/06/17  14:45    














- Physical Exam


Constitutional: chronically ill appearing, obese, uncomfortable


Eyes: PERRL, anicteric sclera, icteric sclera


Ears, Nose, Mouth, Throat: moist mucous membranes, hearing normal, ears appear 

normal


Cardiovascular: No JVD, No tachycardia, No edema


Respiratory: no respiratory distress, no rales or rhonchi, reduced air movement


Gastrointestinal: tenderness, ascites, No guarding


Skin: warm, No mottled, No pressure ulcer


Musculoskeletal: muscular tenderness, abnormal gait, generalized weakness


Psychiatric: not anxious, thought process linear, poor insight, poor judgement, 

poor memory





ICD10 Worksheet


Patient Problems: 


 Problems











Problem Status Onset


 


Dyspnea Acute  


 


Liver cirrhosis Acute  


 


Hepatorenal failure Acute  


 


Hyperammonemia Acute  


 


C. difficile diarrhea Acute  ~05/12/17


 


Alcohol dependence Active  


 


Kidney disease Active  


 


Cellulitis Active  


 


Necrotizing fasciitis Active  


 


Acute renal failure Acute  


 


Squamous cell carcinoma Acute  


 


Alcoholism Chronic  


 


Gait instability Chronic  


 


Squamous cell cancer of skin of right shoulder Acute  


 


Hyperkalemia Acute  


 


Low back pain Chronic  


 


Hepatitis C Chronic  


 


Edema extremities Acute  


 


HTN (hypertension) Acute  


 


MRSA (methicillin resistant staph aureus) culture positive Acute  07/21/14


 


Pneumonia Acute  


 


Hyperbilirubinemia Acute  


 


Weakness Acute

## 2017-06-08 NOTE — HOSPPROG
Hospitalist Progress Note


Assessment/Plan: 








Patient is a 56 year old male with cirrhosis, chronic ETOH use, chronic HCV 

infection and depression who presented to ED 6/1 with complaint of generalized 

weakness, resulting in mechanical fall. ED evaluation revealed evidence of 

hepatitis, hyperammonemia, hyperbilirubinemia. Today is my first encounter with 

the patient, chart reviewed.








# generalized weakness


   multifactorial


   hepatitis, alcohol use


   PT and OT recommending SNF





# chronic hepatitis, cirrhosis, portal htn


   Ammonia elevated/ on lactulose


   


#ascites


   has had paracentesis x 2


   increased Lasix to 40mg po Bid and Aldactone 100 mg daily


   will get daily weights


   


# SIRS


   SBP and UTI ruled out


   lactate improved





#Hypotension


   multifactorial


   tolerating diuretics





# acute on chronic renal failure


   resolved


   


# chronic ETOH use


   Patient reports he wants to decrease his ETOH use and had his last drink 2-4 

days before admission


   ETOH level on presentation is negative. 


   Placed on CIWA protocol, provide Ativan prn and supplement thiamine, folate, 

MVI


   


# plan: will ask GI to weigh in on his prognosis/ already has needed 2 

paracentesis/ wants another/ ? if a drain in place for palliative purposes 


Subjective: Mauro is feeling poorly.


Objective: 


 Vital Signs











Temp Pulse Resp BP Pulse Ox


 


 37.1 C   96   18   116/74   94 


 


 06/08/17 07:36  06/08/17 07:36  06/08/17 07:36  06/08/17 07:36  06/08/17 07:36








 Laboratory Results





 06/08/17 04:26 





 06/08/17 04:26 





 











 06/07/17 06/08/17 06/09/17





 05:59 05:59 05:59


 


Intake Total 800 1600 


 


Output Total  2 


 


Balance 800 1598 








 











PT  21.3 SEC (12.0-15.0)  H  06/06/17  14:45    


 


INR  1.84  (0.83-1.16)  H  06/06/17  14:45    














- Physical Exam


Constitutional: chronically ill appearing, uncomfortable


Eyes: PERRL, icteric sclera


Ears, Nose, Mouth, Throat: hearing normal


Cardiovascular: regular rate and rhythym


Respiratory: no respiratory distress


Gastrointestinal: normoactive bowel sounds, ascites


Skin: warm, other (multiple scabs on his arms, neck due to patient scratiching 

frequently)


Musculoskeletal: no muscle tenderness


Neurologic: other (alert and oriented to place and to his friends)


Psychiatric: interacting appropriately, poor insight, poor judgement, poor 

memory





ICD10 Worksheet


Patient Problems: 


 Problems











Problem Status Onset


 


Hepatorenal failure Acute  


 


Hyperammonemia Acute  


 


Liver cirrhosis Acute  


 


Palliative care encounter Acute  


 


Alcohol dependence Active  


 


Cellulitis Active  


 


Kidney disease Active  


 


Necrotizing fasciitis Active  


 


Acute renal failure Acute  


 


C. difficile diarrhea Acute  ~05/12/17


 


Dyspnea Acute  


 


Edema extremities Acute  


 


HTN (hypertension) Acute  


 


Hyperbilirubinemia Acute  


 


Hyperkalemia Acute  


 


MRSA (methicillin resistant staph aureus) culture positive Acute  07/21/14


 


Pneumonia Acute  


 


Squamous cell cancer of skin of right shoulder Acute  


 


Squamous cell carcinoma Acute  


 


Weakness Acute  


 


Alcoholism Chronic  


 


Gait instability Chronic  


 


Hepatitis C Chronic  


 


Low back pain Chronic

## 2017-06-08 NOTE — GCON
[f rep st]



                                                                    CONSULTATION





DATE OF CONSULTATION:  06/08/2017



REFERRING PHYSICIAN:  Florecita Macias NP



CHIEF COMPLAINT:  Liver disease.



HPI:  I am asked to see this patient in consultation by Florecita Macias, for chief complaint of 
luzma liver disease. The patient is a  56-year-old, with a long history of alcohol abuse in the [facility] of chronic hepatitis C. He has had multiple admissions, and was recently admitted with a feeling o
f weakness and fell. On presentation he was found to have elevated BUN and creatinine, with ascites.
 He was treated conservatively, and his acute renal failure has improved. He has recurrent ascites, 
which has been tapped twice, now on Lasix and Aldactone, but no evidence of SBP. Patient has had isi
e nausea and vomiting, but no hematemesis. No blood in his stools or melena. Denies history of GI bl
eeding. Stopped drinking alcohol about 2 days prior to this admission. He denies any abdominal pain.
 No diarrhea or constipation. The patient has had appointments to see our hepatologist, but has no s
howed on at least 2 occasions, has not yet been established in our clinic. Patient was recently admi
tted a month ago to [facility], and hospitalization was complicated by C difficile i
nfection.



ALLERGIES:  No known allergies.



MEDICATIONS ON ADMISSION:  Neurontin, Motrin, metoprolol, and triamcinolone.



PAST MEDICAL HISTORY:  Notable for alcohol abuse, hepatitis C, hypertension, necrotizing fasciitis t
o the right lower extremity, history of depression, with behavioral health hospitalizations in the [facility].



SOCIAL HISTORY:  Patient drinks alcohol, and stopped alcohol about 2 days prior to admission, withou
t signs of withdrawal.



FAMILY HISTORY:  Notable for mom with Alzheimer disease.



REVIEW OF SYSTEMS:  I performed a complete review of systems, which was negative except for pertinen
t positives and negatives noted in the HPI above.



PHYSICAL EXAM:  VITAL SIGNS: Afebrile at 37.1, /75, pulse 94. CONSTITUTIONAL: Patient is alert
 and oriented. HEENT: Eyes have minimal scleral icterus. No oral lesions. CARDIAC: Regular rhythm. C
HEST: Clear to auscultation. ABDOMEN: Distended with fluid wave, not soft. Cannot assess for hepatos
plenomegaly due to obese abdomen. NEURO: Mild asterixis. SKIN: No spider angiomata.



LABORATORY DATA:  On admission, BUN and creatinine were elevated, but today creatinine is 0.8, BUN 1
3. Alk phos 73, AST 82, ALT 32, total bilirubin of 3.2. Pro Time elevated at 21.3, with an INR 1.84.
 Hematocrit is 30.1, platelets 134, white count 9. Tap showed less than 60 wbc's of the ascitic flui
d. Albumin was low, consistent with portal hypertension. Lipase was normal at 140. MELD score was ca
lculated at less than 32 on admission. Ultrasound shows enlarged liver with steatosis, evidence of p
ortal hypertension, ascites noted.



ASSESSMENT:  

1.  Advanced liver disease from alcohol and hepatitis C, with evidence of cirrhosis. At this point, 
patient would not be a transplant candidate because of his active alcohol use. Also, would not be ab
le to initiate any treatment for hepatitis C until he is abstinent from alcohol.

2.  Evidence of portal hypertension.

3.  Ascites.

4.  Mild encephalopathy.

5.  Chronic alcohol use.

6.  Recent renal failure, although improved with medical therapy. Major issue now appears to be his 
recurrent ascites. Would not call it refractory yet, as he has not been on higher dose diuretics. Scott cervantes states that he is committed to alcohol cessation.



PLAN:  

1.  Recommend low-sodium diet. It should be less than 2 g daily.

2.  Agree with increased diuretics and to monitor response. Could consider TIPS, if he proves to be 
refractory to diuretics.

3.  Continue lactulose for encephalopathy, which is mild to this point, but could consider Xifaxan i
f needed.

4.  I would recommend discontinuing NSAIDs.

5.  Alcohol abstinence.

6.  May consider an upper endoscopy, at some point when he is improved.

7.  Patient is on a beta blocker. However, from the standpoint of portal hypertension, he may do bet
ter if we change him to a nonselective beta blocker, so, if appropriate, consider switching his meto
prolol to propranolol or nadolol. Will follow. 



Thank you for this consult.





Job #:  [id number]

## 2017-06-09 NOTE — HOSPPROG
Hospitalist Progress Note


Assessment/Plan: 








Patient is a 56 year old male with cirrhosis, chronic ETOH use, chronic HCV 

infection and depression who presented to ED 6/1 with complaint of generalized 

weakness, resulting in mechanical fall. ED evaluation revealed evidence of 

hepatitis, hyperammonemia, hyperbilirubinemia. 








# generalized weakness


   multifactorial


   hepatitis, alcohol use


   PT and OT recommending SNF





# chronic hepatitis, cirrhosis, portal htn


   Ammonia elevated/ on lactulose


   will dc Metoprolol and start on nonselective b blocker (propranolol)


   


#ascites/concerned this is becoming refractory


   has had paracentesis x 2


   increased Lasix to 40mg po Bid and Aldactone 100 mg daily


   daily weights/ today is 106 kg


   may need TIPS procedure


   will check coags/concerned he will need another paracentesis today or 

tomorrow


   


# SIRS


   SBP and UTI ruled out


   lactate improved





#Hypotension


   resolved





# acute on chronic renal failure


   resolved


   


# chronic ETOH use


   Patient reports he wants to decrease his ETOH use and had his last drink 2-4 

days before admission


   ETOH level on presentation is negative. 


   Placed on CIWA protocol, provide Ativan prn and supplement thiamine, folate, 

MVI


   


# plan: check coags/ may need another paracentesis/ abdomen increasing in size. 

Will discuss with Dr Marinelli.


Subjective: Mauro is feeling poorly today.


Objective: 


 Vital Signs











Temp Pulse Resp BP Pulse Ox


 


 36.6 C   88   18   126/90 H  92 


 


 06/09/17 08:05  06/09/17 08:05  06/09/17 08:05  06/09/17 08:05  06/09/17 08:05








 Laboratory Results





 06/08/17 04:26 





 06/08/17 04:26 





 











 06/08/17 06/09/17 06/10/17





 05:59 05:59 05:59


 


Intake Total 1600 250 


 


Output Total 2 250 


 


Balance 1598 0 








 











PT  21.3 SEC (12.0-15.0)  H  06/06/17  14:45    


 


INR  1.84  (0.83-1.16)  H  06/06/17  14:45    














- Physical Exam


Constitutional: chronically ill appearing, uncomfortable


Eyes: PERRL, icteric sclera


Ears, Nose, Mouth, Throat: hearing normal


Cardiovascular: regular rate and rhythym


Respiratory: no respiratory distress, reduced air movement (bases)


Gastrointestinal: normoactive bowel sounds, ascites


Skin: warm, abrasion (small scabs scattered over arms, neck, chest)


Musculoskeletal: generalized weakness


Neurologic: AAOx3


Psychiatric: interacting appropriately, not anxious





ICD10 Worksheet


Patient Problems: 


 Problems











Problem Status Onset


 


Hepatorenal failure Acute  


 


Hyperammonemia Acute  


 


Liver cirrhosis Acute  


 


Palliative care encounter Acute  


 


Alcohol dependence Active  


 


Cellulitis Active  


 


Kidney disease Active  


 


Necrotizing fasciitis Active  


 


Acute renal failure Acute  


 


C. difficile diarrhea Acute  ~05/12/17


 


Dyspnea Acute  


 


Edema extremities Acute  


 


HTN (hypertension) Acute  


 


Hyperbilirubinemia Acute  


 


Hyperkalemia Acute  


 


MRSA (methicillin resistant staph aureus) culture positive Acute  07/21/14


 


Pneumonia Acute  


 


Squamous cell cancer of skin of right shoulder Acute  


 


Squamous cell carcinoma Acute  


 


Weakness Acute  


 


Alcoholism Chronic  


 


Gait instability Chronic  


 


Hepatitis C Chronic  


 


Low back pain Chronic

## 2017-06-09 NOTE — SOAPPROG
SOAP Progress Note


Assessment/Plan: 


Assessment:





Advanced liver disease ETOH and Hep C


Ascites recurrent on lasix and Aldactone


Encephalopathy on lactulose




















Plan: Continue supportive care


        Mat need repeat tap.  Consider TIS


        Continue lactulose


        Dr. Rivera to assume service tonight at 5:00 PM





06/09/17 11:50





Subjective: 





CC weakness ascites





Pt c/o increasing abdominal  size no SOB.  


Objective: 





 Vital Signs











Temp Pulse Resp BP Pulse Ox


 


 36.6 C   88   18   126/90 H  92 


 


 06/09/17 08:05  06/09/17 08:05  06/09/17 08:05  06/09/17 08:05  06/09/17 08:05








 Laboratory Results





 06/08/17 04:26 





 











 06/08/17 06/09/17 06/10/17





 05:59 05:59 05:59


 


Intake Total 1600 250 


 


Output Total 2 250 


 


Balance 1598 0 








 











PT  21.3 SEC (12.0-15.0)  H  06/06/17  14:45    


 


INR  1.84  (0.83-1.16)  H  06/06/17  14:45    














Physical Exam





- Physical Exam


General Appearance: alert, no apparent distress


Respiratory: normal breath sounds


Cardiac/Chest: regular rate, rhythm


Abdomen: non-tender, distended (ascites)





ICD10 Worksheet


Patient Problems: 


 Problems











Problem Status Onset


 


Hepatorenal failure Acute  


 


Hyperammonemia Acute  


 


Liver cirrhosis Acute  


 


Palliative care encounter Acute  


 


Alcohol dependence Active  


 


Cellulitis Active  


 


Kidney disease Active  


 


Necrotizing fasciitis Active  


 


Acute renal failure Acute  


 


C. difficile diarrhea Acute  ~05/12/17


 


Dyspnea Acute  


 


Edema extremities Acute  


 


HTN (hypertension) Acute  


 


Hyperbilirubinemia Acute  


 


Hyperkalemia Acute  


 


MRSA (methicillin resistant staph aureus) culture positive Acute  07/21/14


 


Pneumonia Acute  


 


Squamous cell cancer of skin of right shoulder Acute  


 


Squamous cell carcinoma Acute  


 


Weakness Acute  


 


Alcoholism Chronic  


 


Gait instability Chronic  


 


Hepatitis C Chronic  


 


Low back pain Chronic

## 2017-06-10 NOTE — HOSPPROG
Hospitalist Progress Note


Assessment/Plan: 








Patient is a 56 year old male with cirrhosis, chronic ETOH use, chronic HCV 

infection and depression who presented to ED 6/1 with complaint of generalized 

weakness, resulting in mechanical fall. ED evaluation revealed evidence of 

hepatitis, hyperammonemia, hyperbilirubinemia. 








# generalized weakness


   multifactorial


   hepatitis, alcohol use


   PT and OT recommending SNF





# chronic hepatitis, cirrhosis, portal htn


   Ammonia elevated/ on lactulose


   will dc Metoprolol and start on nonselective b blocker (propranolol)


   


#ascites/concerned this is becoming refractory


   has had paracentesis x 2


   increased Lasix to 40mg po Bid and Aldactone 100 mg daily


   daily weights/ today is 104 kg


   may need TIPS procedure


   


# SIRS


   SBP and UTI ruled out


   lactate improved





#Hypotension


   resolved





# acute on chronic renal failure


   resolved


   


# chronic ETOH use


   no s/sx of withdrawal


   


# plan: will discuss w GI plans for long term treatment/ he likely needs 

another paracentesis for comfort, will discuss with GI


Subjective: Mauro is feeling poorly, no appetite.


Objective: 


 Vital Signs











Temp Pulse Resp BP Pulse Ox


 


 36.5 C   71   18   113/73   95 


 


 06/10/17 08:02  06/10/17 08:02  06/10/17 08:02  06/10/17 08:02  06/10/17 08:02








 Laboratory Results





 06/08/17 04:26 





 06/09/17 11:30 





 











 06/09/17 06/10/17 06/11/17





 05:59 05:59 05:59


 


Intake Total 250 400 


 


Output Total 250 300 


 


Balance 0 100 








 











PT  21.7 SEC (12.0-15.0)  H  06/09/17  13:03    


 


INR  1.88  (0.83-1.16)  H  06/09/17  13:03    














- Physical Exam


Constitutional: chronically ill appearing, uncomfortable


Eyes: icteric sclera


Ears, Nose, Mouth, Throat: hearing normal


Cardiovascular: regular rate and rhythym


Respiratory: no respiratory distress, reduced air movement


Gastrointestinal: normoactive bowel sounds, ascites


Skin: warm


Musculoskeletal: generalized weakness


Neurologic: AAOx3


Psychiatric: interacting appropriately, not anxious





ICD10 Worksheet


Patient Problems: 


 Problems











Problem Status Onset


 


Hepatorenal failure Acute  


 


Hyperammonemia Acute  


 


Liver cirrhosis Acute  


 


Palliative care encounter Acute  


 


Alcohol dependence Active  


 


Cellulitis Active  


 


Kidney disease Active  


 


Necrotizing fasciitis Active  


 


Acute renal failure Acute  


 


C. difficile diarrhea Acute  ~05/12/17


 


Dyspnea Acute  


 


Edema extremities Acute  


 


HTN (hypertension) Acute  


 


Hyperbilirubinemia Acute  


 


Hyperkalemia Acute  


 


MRSA (methicillin resistant staph aureus) culture positive Acute  07/21/14


 


Pneumonia Acute  


 


Squamous cell cancer of skin of right shoulder Acute  


 


Squamous cell carcinoma Acute  


 


Weakness Acute  


 


Alcoholism Chronic  


 


Gait instability Chronic  


 


Hepatitis C Chronic  


 


Low back pain Chronic

## 2017-06-10 NOTE — SOAPPROG
SOAP Progress Note


Assessment/Plan: 


Assessment:








Portal Hypertension, Cirrhosis. Ascites








Plan:





1. Continue on Diuretic





2. Daily weights





3. 2 gram low sodium diet





4. If significant recurrent ascites, recommend large Volume Paracentesis with 

IV Albumin 














06/10/17 13:50





Subjective: 





CC: Ascites, liver disease





Patient with abdominal distention, No significant abdominal pain


Objective: 





 Vital Signs











Temp Pulse Resp BP Pulse Ox


 


 36.5 C   71   18   113/73   95 


 


 06/10/17 08:02  06/10/17 08:02  06/10/17 08:02  06/10/17 08:02  06/10/17 08:02








 Laboratory Results





 06/08/17 04:26 





 06/09/17 11:30 





 











 06/09/17 06/10/17 06/11/17





 05:59 05:59 05:59


 


Intake Total 250 400 


 


Output Total 250 300 


 


Balance 0 100 








 











PT  22.3 SEC (12.0-15.0)  H  06/10/17  Unknown


 


INR  1.94  (0.83-1.16)  H  06/10/17  Unknown














 











Generic Name Dose Route Start Last Admin





  Trade Name Freq  PRN Reason Stop Dose Admin


 


Acetaminophen  650 mg  06/02/17 00:27  





  Tylenol  PO  11/29/17 00:26  





  Q4HRS PRN   





  Pain, Mild/Fever, Can Take PO   


 


Albuterol  3 ml  06/02/17 00:27  





  Proventil Neb  IH  11/29/17 00:26  





  Q2HRS PRN   





  Short of Breath/Dyspnea   


 


Folic Acid  1 mg  06/02/17 09:00  06/10/17 09:38





  Folic Acid  PO  11/29/17 08:59  1 mg





  DAILY QUINCY   Administration


 


Furosemide  40 mg  06/07/17 09:00  06/10/17 09:39





  Lasix  PO  12/04/17 08:59  40 mg





  BID QUINCY   Administration


 


Gabapentin  1,200 mg  06/02/17 21:00  06/10/17 09:38





  Neurontin  PO  11/29/17 20:59  1,200 mg





  BID QUINCY   Administration


 


Gabapentin  200 mg  06/02/17 21:00  06/10/17 09:38





  Neurontin  PO  11/29/17 20:59  200 mg





  BID QUINCY   Administration


 


Heparin Sodium (Porcine)  5,000 unit  06/02/17 06:00  06/10/17 07:33





  Heparin Sc Injection  SC  11/29/17 05:59  5,000 unit





  Q8 QUINCY   Administration


 


Hydroxyzine HCl  25 mg  06/02/17 11:22  06/03/17 13:17





  Hydroxyzine Hcl  PO  11/29/17 11:21  25 mg





  TID PRN   Administration





  Anxiety   


 


Magnesium Sulfate/Dextrose  100 mls @ 100 mls/hr  06/10/17 13:15  





  Magnesium Sulf 1 Gm (Premix)  IV  06/10/17 14:14  





  ONCE ONE   


 


Lactulose  20 gm  06/06/17 21:00  06/10/17 09:39





  Cephulac  PO  12/03/17 20:59  20 gm





  BID QUINCY   Administration


 


Lorazepam  1 mg  06/02/17 01:21  





  Ativan Injection  IVP  11/29/17 01:20  





  Q4HRS PRN   





  Agitation   


 


Lorazepam  1 mg  06/03/17 12:11  06/10/17 07:32





  Ativan  PO  11/30/17 12:10  1 mg





  Q4HRS PRN   Administration





  Anxiety, Able to Take PO   


 


Magnesium Sulfate  1 dose  06/02/17 01:23  





  Protocol Magnesium  IV  11/29/17 01:22  





  AD PRN   





  Pt on Electrolyte Protocol   





  Protocol   


 


Morphine Sulfate  2 mg  06/02/17 00:27  06/10/17 07:32





  Morphine  IVP  06/12/17 00:26  2 mg





  Q4HRS PRN   Administration





  Pain, Severe Unable to Take PO   


 


Multivitamins  1 each  06/02/17 09:00  06/10/17 09:38





  Tab-A-Estella  PO  11/29/17 08:59  1 each





  DAILY QUINCY   Administration


 


Ondansetron HCl  4 mg  06/02/17 00:27  06/09/17 19:47





  Zofran  IVP  11/29/17 00:26  4 mg





  Q4HRS PRN   Administration





  Nausea/Vomiting, Can't Take PO   


 


Ondansetron HCl  4 mg  06/02/17 00:27  





  Zofran Odt  PO  11/29/17 00:26  





  Q4HRS PRN   





  Nausea/Vomiting, Use 1st   


 


Pantoprazole Sodium  40 mg  06/02/17 11:45  06/10/17 09:39





  Protonix  PO  11/29/17 11:44  40 mg





  DAILY QUINCY   Administration


 


Potassium Chloride  20 meq  06/02/17 11:45  06/10/17 09:39





  Klor-Con  PO  11/29/17 11:44  20 meq





  DAILY QUINCY   Administration


 


Propranolol HCl  20 mg  06/09/17 21:00  06/10/17 09:39





  Inderal  PO  12/06/17 20:59  20 mg





  BID QUINCY   Administration


 


Sertraline HCl  50 mg  06/02/17 11:45  06/10/17 09:39





  Zoloft  PO  11/29/17 11:44  50 mg





  DAILY QUINCY   Administration


 


Spironolactone  100 mg  06/07/17 08:36  06/10/17 09:38





  Aldactone  PO  11/29/17 11:29  100 mg





  DAILY QUINCY   Administration


 


Thiamine HCl  100 mg  06/02/17 09:00  06/10/17 09:38





  Vitamin B-1  PO  11/29/17 08:59  100 mg





  DAILY QUINCY   Administration


 


Tramadol HCl  50 mg  06/08/17 06:00  06/10/17 10:59





  Ultram  PO  12/05/17 05:59  50 mg





  Q12 PRN   Administration





  Pain, Moderate Able to Take PO   


 


Triamcinolone  1 elizabeth  06/02/17 11:22  





  Triamcinolone 0.1%  TP  11/29/17 11:21  





  BID PRN   





  SKIN   














Discontinued Medications














Generic Name Dose Route Start Last Admin





  Trade Name Freq  PRN Reason Stop Dose Admin


 


Chlordiazepoxide HCl  25 mg  06/02/17 11:30  06/02/17 13:02





  Librium  PO  11/29/17 11:29  25 mg





  AD QUINCY   Administration


 


Furosemide  40 mg  06/02/17 11:30  06/04/17 08:54





  Lasix  PO  11/29/17 11:29  40 mg





  DAILY QUINCY   Administration


 


Hydromorphone HCl  0.5 mg  06/01/17 22:26  06/01/17 23:16





  Dilaudid  IVP  06/01/17 22:27  0.5 mg





  EDNOW ONE   Administration


 


Sodium Chloride  1,000 mls @ 0 mls/hr  06/01/17 22:26  06/01/17 23:17





  Ns  IV  06/01/17 22:27  1,000 mls





  ONCE ONE   Administration





  Wide Open   


 


Ceftriaxone Sodium/Dextrose  50 mls @ 100 mls/hr  06/02/17 05:00  06/03/17 08:48





  Rocephin 1 Gm (Premix)  IV  07/02/17 04:59  50 mls





  DAILY QUINCY   Administration





  Protocol   


 


Albumin Human  500 mls @ 0 mls/hr  06/02/17 05:11  06/02/17 06:29





  Alburx 5  IV  06/02/17 05:12  500 mls





  ONCE ONE   Administration





  As Directed   


 


Sodium Chloride  1,000 mls @ 75 mls/hr  06/02/17 06:08  06/02/17 06:29





  Ns  IV  06/02/17 19:27  1,000 mls





  ONCE ONE   Administration


 


Sodium Chloride  1,000 mls @ 50 mls/hr  06/03/17 10:45  06/04/17 08:52





  Ns  IV  11/30/17 10:44  1,000 mls





  CONT QUINCY   Administration


 


Magnesium Sulfate/Dextrose  100 mls @ 100 mls/hr  06/03/17 11:30  06/03/17 12:09





  Magnesium Sulf 1 Gm (Premix)  IV  06/03/17 12:29  100 mls





  ONCE ONE   Administration


 


Albumin Human  500 mls @ 0 mls/hr  06/03/17 21:28  06/03/17 21:35





  Alburx 5  IV  06/03/17 21:29  500 mls





  ONCE ONE   Administration





  As Directed   


 


Ceftriaxone Sodium/Dextrose  50 mls @ 100 mls/hr  06/03/17 22:30  06/05/17 11:13





  Rocephin 1 Gm (Premix)  IV  07/03/17 22:29  Not Given





  DAILY QUINCY   





  Protocol   


 


Albumin Human  500 mls @ 0 mls/hr  06/04/17 09:18  06/04/17 10:39





  Alburx 5  IV  06/04/17 09:19  500 mls





  ONCE ONE   Administration





  As Directed   


 


Magnesium Sulfate/Dextrose  100 mls @ 100 mls/hr  06/05/17 10:00  06/05/17 12:35





  Magnesium Sulf 1 Gm (Premix)  IV  06/05/17 10:59  Not Given





  ONCE ONE   


 


Magnesium Sulfate  50 mls @ 50 mls/hr  06/06/17 12:05  06/06/17 13:06





  Magnesium Sulf 2 Gm (Premix)  IV  06/06/17 13:04  50 mls





  ONCE ONE   Administration


 


Magnesium Sulfate/Dextrose  100 mls @ 100 mls/hr  06/07/17 09:34  06/07/17 09:51





  Magnesium Sulf 1 Gm (Premix)  IV  06/07/17 10:33  100 mls





  ONCE ONE   Administration


 


Magnesium Sulfate/Dextrose  100 mls @ 100 mls/hr  06/08/17 07:24  06/08/17 10:09





  Magnesium Sulf 1 Gm (Premix)  IV  06/08/17 08:23  100 mls





  ONCE ONE   Administration


 


Magnesium Sulfate/Dextrose  100 mls @ 100 mls/hr  06/09/17 12:07  06/09/17 13:21





  Magnesium Sulf 1 Gm (Premix)  IV  06/09/17 13:06  100 mls





  ONCE ONE   Administration


 


Ibuprofen  800 mg  06/02/17 12:00  06/03/17 08:49





  Motrin  PO  11/29/17 11:59  800 mg





  TIDMEAL QUINCY   Administration


 


Lactulose  20 gm  06/02/17 09:00  06/04/17 08:56





  Cephulac  PO  11/29/17 08:59  Not Given





  TID QUINCY   


 


Lactulose  30 gm  06/04/17 09:21  





  Cephulac  PO  11/29/17 08:59  





  TID QUINCY   


 


Lactulose  20 gm  06/04/17 09:27  06/06/17 11:14





  Cephulac  PO  11/29/17 08:59  Not Given





  TID QUINCY   


 


Lidocaine HCl  Confirm  06/06/17 15:24  





  Lidocaine Hcl 1%  Administered  06/06/17 15:25  





  Dose   





  300 mg   





  .ROUTE   





  .STK-MED ONE   


 


Metoprolol Tartrate  12.5 mg  06/02/17 11:30  06/09/17 09:37





  Lopressor  PO  11/29/17 11:29  12.5 mg





  BID QUINCY   Administration


 


Morphine Sulfate  2 mg  06/07/17 05:45  06/07/17 05:35





  Morphine  IVP  06/07/17 05:46  2 mg





  ONCE ONE   Administration


 


Ondansetron HCl  4 mg  06/01/17 22:26  06/01/17 23:18





  Zofran  IVP  06/01/17 22:27  4 mg





  EDNOW ONE   Administration


 


Potassium Chloride  1 dose  06/02/17 01:23  





  Protocol Potassium  MISC  11/29/17 01:22  





  AD PRN   





  Pt on Electrolyte Protocol   





  Protocol   


 


Potassium Chloride  10 - 40 meq  06/02/17 08:18  06/02/17 08:55





  Klor-Con  PO  06/02/17 08:19  30 meq





  ONCE ONE   Administration





  Protocol   


 


Potassium Chloride  10 - 40 meq  06/02/17 20:26  06/02/17 21:30





  Klor-Con  PO  06/02/17 20:27  20 meq





  ONCE ONE   Administration





  Protocol   


 


Potassium Chloride  10 - 40 meq  06/03/17 11:30  06/03/17 12:10





  Klor-Con  PO  06/03/17 11:31  10 meq





  ONCE ONE   Administration





  Protocol   


 


Potassium Chloride  20 meq  06/03/17 20:46  06/03/17 21:05





  Klor-Con  PO  06/03/17 20:47  20 meq





  ONCE ONE   Administration





  Protocol   


 


Potassium Chloride  10 - 40 meq  06/04/17 07:49  06/04/17 08:53





  Klor-Con  PO  06/04/17 07:50  30 meq





  ONCE ONE   Administration





  Protocol   


 


Potassium Chloride  10 meq  06/05/17 10:00  06/05/17 12:35





  Klor-Con  PO  06/05/17 10:01  Not Given





  ONCE ONE   


 


Potassium Chloride  10 - 40 meq  06/05/17 19:30  06/05/17 21:12





  Klor-Con  PO  06/05/17 19:31  10 meq





  ONCE ONE   Administration





  Protocol   


 


Potassium Chloride  10 - 40 meq  06/06/17 12:03  06/06/17 13:06





  Klor-Con  PO  06/06/17 12:04  10 meq





  ONCE ONE   Administration





  Protocol   


 


Spironolactone  50 mg  06/02/17 11:30  06/06/17 10:53





  Aldactone  PO  11/29/17 11:29  50 mg





  DAILY QUINCY   Administration


 


Tramadol HCl  100 mg  06/03/17 10:40  06/07/17 09:48





  Ultram  PO  11/30/17 10:39  100 mg





  Q6HRS PRN   Administration





  Pain, Moderate Able to Take PO   














Physical Exam





- Physical Exam


General Appearance: alert, no apparent distress


Respiratory: normal breath sounds


Cardiac/Chest: regular rate, rhythm


Abdomen: normal bowel sounds, non-tender, distended


Skin: normal color, warm/dry


Lymphatic: no adenopathy


Neuro/Psych: no motor/sensory deficits, alert, normal mood/affect





ICD10 Worksheet


Patient Problems: 


 Problems











Problem Status Onset


 


Hepatorenal failure Acute  


 


Hyperammonemia Acute  


 


Liver cirrhosis Acute  


 


Palliative care encounter Acute  


 


Alcohol dependence Active  


 


Cellulitis Active  


 


Kidney disease Active  


 


Necrotizing fasciitis Active  


 


Acute renal failure Acute  


 


C. difficile diarrhea Acute  ~05/12/17


 


Dyspnea Acute  


 


Edema extremities Acute  


 


HTN (hypertension) Acute  


 


Hyperbilirubinemia Acute  


 


Hyperkalemia Acute  


 


MRSA (methicillin resistant staph aureus) culture positive Acute  07/21/14


 


Pneumonia Acute  


 


Squamous cell cancer of skin of right shoulder Acute  


 


Squamous cell carcinoma Acute  


 


Weakness Acute  


 


Alcoholism Chronic  


 


Gait instability Chronic  


 


Hepatitis C Chronic  


 


Low back pain Chronic

## 2017-06-11 NOTE — HOSPPROG
Hospitalist Progress Note


Assessment/Plan: 








Patient is a 56 year old male with cirrhosis, chronic ETOH use, chronic HCV 

infection and depression who presented to ED 6/1 with complaint of generalized 

weakness, resulting in mechanical fall. ED evaluation revealed evidence of 

hepatitis, hyperammonemia, hyperbilirubinemia. 








# generalized weakness


   multifactorial


   hepatitis, alcohol use


   PT and OT recommending SNF





# chronic hepatitis, cirrhosis, portal htn


   Ammonia elevated/ on lactulose


   will dc Metoprolol and start on nonselective b blocker (propranolol)


   to get a scope poss tomorrow. Dr Rivera to f/u with Dr Crain (he is on for 

tomorrow)


   


#ascites/concerned this is becoming refractory


   has had paracentesis x 3 (4 liters off yesterday)


   increased Lasix to 40mg po TID, and Aldactone 200 mg daily


   daily weights/ today is 98.9 kg


   may need TIPS procedure


   


# SIRS


   SBP and UTI ruled out


   lactate improved





#Hypotension


   occurring today





#nausea this morning


   zofran





#electrolyte abnormalities


   on protocol





# acute on chronic renal failure


   resolved


   


# chronic ETOH use


   no s/sx of withdrawal


   


# plan: reviewed his care with Dr Rivera, will closely watch renal function in 

the setting of increasing his diuretics, to get scope poss tomorrow, will make 

npo after midnight


Subjective: Mauro is feeling poorly this morning/ having increase nausea.


Objective: 


 Vital Signs











Temp Pulse Resp BP Pulse Ox


 


 36.7 C   72   16   100/70   94 


 


 06/11/17 07:41  06/11/17 07:41  06/11/17 07:41  06/11/17 08:36  06/11/17 07:41








 Laboratory Results





 06/11/17 04:40 





 06/09/17 11:30 





 











 06/10/17 06/11/17 06/12/17





 05:59 05:59 05:59


 


Intake Total 400 1000 


 


Output Total 300 400 


 


Balance 100 600 








 











PT  22.6 SEC (12.0-15.0)  H  06/11/17  04:40    


 


INR  1.98  (0.83-1.16)  H  06/11/17  04:40    














- Physical Exam


Constitutional: chronically ill appearing, uncomfortable


Eyes: PERRL, icteric sclera


Ears, Nose, Mouth, Throat: hearing normal


Cardiovascular: regular rate and rhythym


Respiratory: no respiratory distress


Gastrointestinal: normoactive bowel sounds, ascites (but much improved with 

paracentesis)


Skin: warm, other (jaundice)


Musculoskeletal: generalized weakness


Neurologic: AAOx3


Psychiatric: interacting appropriately





ICD10 Worksheet


Patient Problems: 


 Problems











Problem Status Onset


 


Hepatorenal failure Acute  


 


Hyperammonemia Acute  


 


Liver cirrhosis Acute  


 


Palliative care encounter Acute  


 


Alcohol dependence Active  


 


Cellulitis Active  


 


Kidney disease Active  


 


Necrotizing fasciitis Active  


 


Acute renal failure Acute  


 


C. difficile diarrhea Acute  ~05/12/17


 


Dyspnea Acute  


 


Edema extremities Acute  


 


HTN (hypertension) Acute  


 


Hyperbilirubinemia Acute  


 


Hyperkalemia Acute  


 


MRSA (methicillin resistant staph aureus) culture positive Acute  07/21/14


 


Pneumonia Acute  


 


Squamous cell cancer of skin of right shoulder Acute  


 


Squamous cell carcinoma Acute  


 


Weakness Acute  


 


Alcoholism Chronic  


 


Gait instability Chronic  


 


Hepatitis C Chronic  


 


Low back pain Chronic

## 2017-06-11 NOTE — SOAPPROG
SOAP Progress Note


Assessment/Plan: 


Assessment:





Unfortunate 56 year old gentleman with cirrhosis,  portal hypertension and 

ascites. He has normal renal function on diuretics, Lasix 80 mg daily and 

Aldactone 100 mg daily. Earlier this month he did have azotemia in the setting 

of diarrhea and dehydration. 





He has required several paracentesis for symptomatic ascites.  He has received 

IV albumin with paracentesis.  He is on a low sodium diet.  A spot urine sodium 

was < 5 meq/liter. 








Plan:





1. In the setting of normal renal function and low urine sodium I would 

recommend increasing diuretics.  I would increase Lasix 120 mg daily (max of 

160 mg daily) and Aldactone to 200 mg daily (max of 400 mg daily).  





2. Will need to monitor renal function and check BUN and Creatine daily while 

adjusting diuretics.  Repeat a urine sodium several days after increasing the 

diuretics.





3. Can consider the addition of Midodrine 5 mg TID if normal renal function and 

not responsive to Lasix and Aldactone. 





4. Daily weights and please monitor I and O's closely





5. Continue on 2 gram low sodium diet





6. Large Volume Paracentesis with IV Albumin as needed





7. Continue on low does Propranolol for now but if still refractory ascites may 

need to discontinue





8. Patient should have EGD for screening of varices. This can be done on this 

admission





9. If does not tolerate increased dose of diuretics then patient should be 

considered for TIPS. This can be done by IR at [facility].





10.  When patient is discharged he will need to follow up with hepatology, Dr. Jones.  





06/11/17 08:59





Subjective: 





CC: Cirrhosis with portal hypertension





Patient had large volume paracentesis.  He feels more comfortable after 

paracentesis.


Objective: 





 Vital Signs











Temp Pulse Resp BP Pulse Ox


 


 36.7 C   72   16   100/70   94 


 


 06/11/17 07:41  06/11/17 07:41  06/11/17 07:41  06/11/17 08:36  06/11/17 07:41








 Laboratory Results





 06/11/17 04:40 





 06/09/17 11:30 





 











 06/10/17 06/11/17 06/12/17





 05:59 05:59 05:59


 


Intake Total 400 1000 


 


Output Total 300 400 


 


Balance 100 600 








 











PT  22.6 SEC (12.0-15.0)  H  06/11/17  04:40    


 


INR  1.98  (0.83-1.16)  H  06/11/17  04:40    














 











Generic Name Dose Route Start Last Admin





  Trade Name Robinq  PRN Reason Stop Dose Admin


 


Acetaminophen  650 mg  06/02/17 00:27  





  Tylenol  PO  11/29/17 00:26  





  Q4HRS PRN   





  Pain, Mild/Fever, Can Take PO   


 


Albuterol  3 ml  06/02/17 00:27  





  Proventil Neb  IH  11/29/17 00:26  





  Q2HRS PRN   





  Short of Breath/Dyspnea   


 


Folic Acid  1 mg  06/02/17 09:00  06/11/17 08:35





  Folic Acid  PO  11/29/17 08:59  1 mg





  DAILY QUINCY   Administration


 


Furosemide  40 mg  06/07/17 09:00  06/11/17 08:35





  Lasix  PO  12/04/17 08:59  40 mg





  BID QUINCY   Administration


 


Gabapentin  1,200 mg  06/02/17 21:00  06/11/17 08:36





  Neurontin  PO  11/29/17 20:59  1,200 mg





  BID QUINCY   Administration


 


Gabapentin  200 mg  06/02/17 21:00  06/11/17 08:36





  Neurontin  PO  11/29/17 20:59  200 mg





  BID QUINCY   Administration


 


Heparin Sodium (Porcine)  5,000 unit  06/02/17 06:00  06/11/17 06:26





  Heparin Sc Injection  SC  11/29/17 05:59  5,000 unit





  Q8 QUINCY   Administration


 


Hydroxyzine HCl  25 mg  06/02/17 11:22  06/03/17 13:17





  Hydroxyzine Hcl  PO  11/29/17 11:21  25 mg





  TID PRN   Administration





  Anxiety   


 


Lactulose  20 gm  06/06/17 21:00  06/11/17 08:36





  Cephulac  PO  12/03/17 20:59  20 gm





  BID QUINCY   Administration


 


Lorazepam  1 mg  06/03/17 12:11  06/11/17 08:36





  Ativan  PO  11/30/17 12:10  1 mg





  Q4HRS PRN   Administration





  Anxiety, Able to Take PO   


 


Magnesium Sulfate  1 dose  06/02/17 01:23  





  Protocol Magnesium  IV  11/29/17 01:22  





  AD PRN   





  Pt on Electrolyte Protocol   





  Protocol   


 


Multivitamins  1 each  06/02/17 09:00  06/11/17 08:36





  Tab-A-Estella  PO  11/29/17 08:59  1 each





  DAILY QUINCY   Administration


 


Ondansetron HCl  4 mg  06/02/17 00:27  06/09/17 19:47





  Zofran  IVP  11/29/17 00:26  4 mg





  Q4HRS PRN   Administration





  Nausea/Vomiting, Can't Take PO   


 


Ondansetron HCl  4 mg  06/02/17 00:27  06/10/17 15:42





  Zofran Odt  PO  11/29/17 00:26  4 mg





  Q4HRS PRN   Administration





  Nausea/Vomiting, Use 1st   


 


Oxycodone HCl  5 mg  06/10/17 16:59  





  Oxycodone Ir  PO  06/20/17 16:58  





  Q4HRS PRN   





  Pain, Severe Able to Take PO   


 


Pantoprazole Sodium  40 mg  06/02/17 11:45  06/11/17 08:36





  Protonix  PO  11/29/17 11:44  40 mg





  DAILY QUINCY   Administration


 


Potassium Chloride  20 meq  06/02/17 11:45  06/11/17 08:35





  Klor-Con  PO  11/29/17 11:44  20 meq





  DAILY QUINCY   Administration


 


Propranolol HCl  20 mg  06/09/17 21:00  06/11/17 08:36





  Inderal  PO  12/06/17 20:59  20 mg





  BID QUINCY   Administration


 


Sertraline HCl  50 mg  06/02/17 11:45  06/11/17 08:35





  Zoloft  PO  11/29/17 11:44  50 mg





  DAILY QUINCY   Administration


 


Spironolactone  100 mg  06/07/17 08:36  06/10/17 09:38





  Aldactone  PO  11/29/17 11:29  100 mg





  DAILY QUINCY   Administration


 


Thiamine HCl  100 mg  06/02/17 09:00  06/11/17 08:35





  Vitamin B-1  PO  11/29/17 08:59  100 mg





  DAILY QUINCY   Administration


 


Tramadol HCl  50 mg  06/08/17 06:00  06/10/17 10:59





  Ultram  PO  12/05/17 05:59  50 mg





  Q12 PRN   Administration





  Pain, Moderate Able to Take PO   


 


Triamcinolone  1 elizabeth  06/02/17 11:22  





  Triamcinolone 0.1%  TP  11/29/17 11:21  





  BID PRN   





  SKIN   














Discontinued Medications














Generic Name Dose Route Start Last Admin





  Trade Name Freq  PRN Reason Stop Dose Admin


 


Albumin Human  Confirm  06/10/17 18:11  





  Flexbumin 25 % (Premix)  Administered  06/10/17 18:12  





  Dose   





  100 ml   





  IV   





  .STK-MED ONE   


 


Chlordiazepoxide HCl  25 mg  06/02/17 11:30  06/02/17 13:02





  Librium  PO  11/29/17 11:29  25 mg





  AD QUINCY   Administration


 


Furosemide  40 mg  06/02/17 11:30  06/04/17 08:54





  Lasix  PO  11/29/17 11:29  40 mg





  DAILY QUINCY   Administration


 


Hydromorphone HCl  0.5 mg  06/01/17 22:26  06/01/17 23:16





  Dilaudid  IVP  06/01/17 22:27  0.5 mg





  EDNOW ONE   Administration


 


Sodium Chloride  1,000 mls @ 0 mls/hr  06/01/17 22:26  06/01/17 23:17





  Ns  IV  06/01/17 22:27  1,000 mls





  ONCE ONE   Administration





  Wide Open   


 


Ceftriaxone Sodium/Dextrose  50 mls @ 100 mls/hr  06/02/17 05:00  06/03/17 08:48





  Rocephin 1 Gm (Premix)  IV  07/02/17 04:59  50 mls





  DAILY QUINCY   Administration





  Protocol   


 


Albumin Human  500 mls @ 0 mls/hr  06/02/17 05:11  06/02/17 06:29





  Alburx 5  IV  06/02/17 05:12  500 mls





  ONCE ONE   Administration





  As Directed   


 


Sodium Chloride  1,000 mls @ 75 mls/hr  06/02/17 06:08  06/02/17 06:29





  Ns  IV  06/02/17 19:27  1,000 mls





  ONCE ONE   Administration


 


Sodium Chloride  1,000 mls @ 50 mls/hr  06/03/17 10:45  06/04/17 08:52





  Ns  IV  11/30/17 10:44  1,000 mls





  CONT QUINCY   Administration


 


Magnesium Sulfate/Dextrose  100 mls @ 100 mls/hr  06/03/17 11:30  06/03/17 12:09





  Magnesium Sulf 1 Gm (Premix)  IV  06/03/17 12:29  100 mls





  ONCE ONE   Administration


 


Albumin Human  500 mls @ 0 mls/hr  06/03/17 21:28  06/03/17 21:35





  Alburx 5  IV  06/03/17 21:29  500 mls





  ONCE ONE   Administration





  As Directed   


 


Ceftriaxone Sodium/Dextrose  50 mls @ 100 mls/hr  06/03/17 22:30  06/05/17 11:13





  Rocephin 1 Gm (Premix)  IV  07/03/17 22:29  Not Given





  DAILY QUINCY   





  Protocol   


 


Albumin Human  500 mls @ 0 mls/hr  06/04/17 09:18  06/04/17 10:39





  Alburx 5  IV  06/04/17 09:19  500 mls





  ONCE ONE   Administration





  As Directed   


 


Magnesium Sulfate/Dextrose  100 mls @ 100 mls/hr  06/05/17 10:00  06/05/17 12:35





  Magnesium Sulf 1 Gm (Premix)  IV  06/05/17 10:59  Not Given





  ONCE ONE   


 


Magnesium Sulfate  50 mls @ 50 mls/hr  06/06/17 12:05  06/06/17 13:06





  Magnesium Sulf 2 Gm (Premix)  IV  06/06/17 13:04  50 mls





  ONCE ONE   Administration


 


Magnesium Sulfate/Dextrose  100 mls @ 100 mls/hr  06/07/17 09:34  06/07/17 09:51





  Magnesium Sulf 1 Gm (Premix)  IV  06/07/17 10:33  100 mls





  ONCE ONE   Administration


 


Magnesium Sulfate/Dextrose  100 mls @ 100 mls/hr  06/08/17 07:24  06/08/17 10:09





  Magnesium Sulf 1 Gm (Premix)  IV  06/08/17 08:23  100 mls





  ONCE ONE   Administration


 


Magnesium Sulfate/Dextrose  100 mls @ 100 mls/hr  06/09/17 12:07  06/09/17 13:21





  Magnesium Sulf 1 Gm (Premix)  IV  06/09/17 13:06  100 mls





  ONCE ONE   Administration


 


Magnesium Sulfate/Dextrose  100 mls @ 100 mls/hr  06/10/17 13:15  06/10/17 15:33





  Magnesium Sulf 1 Gm (Premix)  IV  06/10/17 14:14  100 mls





  ONCE ONE   Administration


 


Albumin Human  100 mls @ 0 mls/hr  06/10/17 14:01  06/10/17 18:13





  Flexbumin 25 % (Premix)  IV  06/10/17 14:02  Not Given





  ONCE ONE   





  As Directed   


 


Albumin Human  100 mls @ 0 mls/hr  06/10/17 18:00  06/10/17 18:13





  Flexbumin 25 % (Premix)  IV  06/10/17 18:01  100 mls





  ONCE ONE   Administration





  As Directed   


 


Magnesium Sulfate  50 mls @ 50 mls/hr  06/11/17 06:22  06/11/17 08:31





  Magnesium Sulf 2 Gm (Premix)  IV  06/11/17 07:21  Not Given





  ONCE ONE   


 


Ibuprofen  800 mg  06/02/17 12:00  06/03/17 08:49





  Motrin  PO  11/29/17 11:59  800 mg





  TIDMEAL QUINCY   Administration


 


Lactulose  20 gm  06/02/17 09:00  06/04/17 08:56





  Cephulac  PO  11/29/17 08:59  Not Given





  TID QUINCY   


 


Lactulose  30 gm  06/04/17 09:21  





  Cephulac  PO  11/29/17 08:59  





  TID QUINCY   


 


Lactulose  20 gm  06/04/17 09:27  06/06/17 11:14





  Cephulac  PO  11/29/17 08:59  Not Given





  TID QUINCY   


 


Lidocaine HCl  Confirm  06/06/17 15:24  





  Lidocaine Hcl 1%  Administered  06/06/17 15:25  





  Dose   





  300 mg   





  .ROUTE   





  .STK-MED ONE   


 


Lidocaine HCl  Confirm  06/10/17 16:03  





  Lidocaine Hcl 1%  Administered  06/10/17 16:04  





  Dose   





  300 mg   





  .ROUTE   





  .STK-MED ONE   


 


Lorazepam  1 mg  06/02/17 01:21  





  Ativan Injection  IVP  11/29/17 01:20  





  Q4HRS PRN   





  Agitation   


 


Metoprolol Tartrate  12.5 mg  06/02/17 11:30  06/09/17 09:37





  Lopressor  PO  11/29/17 11:29  12.5 mg





  BID QUINCY   Administration


 


Morphine Sulfate  2 mg  06/02/17 00:27  06/10/17 07:32





  Morphine  IVP  06/12/17 00:26  2 mg





  Q4HRS PRN   Administration





  Pain, Severe Unable to Take PO   


 


Morphine Sulfate  2 mg  06/07/17 05:45  06/07/17 05:35





  Morphine  IVP  06/07/17 05:46  2 mg





  ONCE ONE   Administration


 


Ondansetron HCl  4 mg  06/01/17 22:26  06/01/17 23:18





  Zofran  IVP  06/01/17 22:27  4 mg





  EDNOW ONE   Administration


 


Potassium Chloride  1 dose  06/02/17 01:23  





  Protocol Potassium  MISC  11/29/17 01:22  





  AD PRN   





  Pt on Electrolyte Protocol   





  Protocol   


 


Potassium Chloride  10 - 40 meq  06/02/17 08:18  06/02/17 08:55





  Klor-Con  PO  06/02/17 08:19  30 meq





  ONCE ONE   Administration





  Protocol   


 


Potassium Chloride  10 - 40 meq  06/02/17 20:26  06/02/17 21:30





  Klor-Con  PO  06/02/17 20:27  20 meq





  ONCE ONE   Administration





  Protocol   


 


Potassium Chloride  10 - 40 meq  06/03/17 11:30  06/03/17 12:10





  Klor-Con  PO  06/03/17 11:31  10 meq





  ONCE ONE   Administration





  Protocol   


 


Potassium Chloride  20 meq  06/03/17 20:46  06/03/17 21:05





  Klor-Con  PO  06/03/17 20:47  20 meq





  ONCE ONE   Administration





  Protocol   


 


Potassium Chloride  10 - 40 meq  06/04/17 07:49  06/04/17 08:53





  Klor-Con  PO  06/04/17 07:50  30 meq





  ONCE ONE   Administration





  Protocol   


 


Potassium Chloride  10 meq  06/05/17 10:00  06/05/17 12:35





  Klor-Con  PO  06/05/17 10:01  Not Given





  ONCE ONE   


 


Potassium Chloride  10 - 40 meq  06/05/17 19:30  06/05/17 21:12





  Klor-Con  PO  06/05/17 19:31  10 meq





  ONCE ONE   Administration





  Protocol   


 


Potassium Chloride  10 - 40 meq  06/06/17 12:03  06/06/17 13:06





  Klor-Con  PO  06/06/17 12:04  10 meq





  ONCE ONE   Administration





  Protocol   


 


Spironolactone  50 mg  06/02/17 11:30  06/06/17 10:53





  Aldactone  PO  11/29/17 11:29  50 mg





  DAILY QUINCY   Administration


 


Tramadol HCl  100 mg  06/03/17 10:40  06/07/17 09:48





  Ultram  PO  11/30/17 10:39  100 mg





  Q6HRS PRN   Administration





  Pain, Moderate Able to Take PO   














Physical Exam





- Physical Exam


General Appearance: alert, no apparent distress


Respiratory: lungs clear, normal breath sounds


Cardiac/Chest: normal peripheral pulses, regular rate, rhythm


Abdomen: normal bowel sounds, non-tender, soft, distended


Skin: normal color, warm/dry


Extremities: swelling (right greater than left, 2  plus pitting edema on right 

and 1 plus on left)


Neuro/Psych: alert, normal mood/affect, oriented x 3





ICD10 Worksheet


Patient Problems: 


 Problems











Problem Status Onset


 


Hepatorenal failure Acute  


 


Hyperammonemia Acute  


 


Liver cirrhosis Acute  


 


Palliative care encounter Acute  


 


Alcohol dependence Active  


 


Cellulitis Active  


 


Kidney disease Active  


 


Necrotizing fasciitis Active  


 


Acute renal failure Acute  


 


C. difficile diarrhea Acute  ~05/12/17


 


Dyspnea Acute  


 


Edema extremities Acute  


 


HTN (hypertension) Acute  


 


Hyperbilirubinemia Acute  


 


Hyperkalemia Acute  


 


MRSA (methicillin resistant staph aureus) culture positive Acute  07/21/14


 


Pneumonia Acute  


 


Squamous cell cancer of skin of right shoulder Acute  


 


Squamous cell carcinoma Acute  


 


Weakness Acute  


 


Alcoholism Chronic  


 


Gait instability Chronic  


 


Hepatitis C Chronic  


 


Low back pain Chronic

## 2017-06-12 NOTE — HOSPPROG
Hospitalist Progress Note


Assessment/Plan: 








Patient is a 56 year old male with cirrhosis, chronic ETOH use, chronic HCV 

infection and depression who presented to ED 6/1 with complaint of generalized 

weakness, resulting in mechanical fall. ED evaluation revealed evidence of 

hepatitis, hyperammonemia, hyperbilirubinemia. 








# generalized weakness


   multifactorial


   hepatitis, alcohol use


   PT and OT recommending SNF





# chronic hepatitis, cirrhosis, portal htn


   Ammonia was elevated/ on lactulose


   will dc Metoprolol and start on nonselective b blocker (propranolol)


   reviewed his care w Dr Crain/he doesn't feel and EGD is necessary at this time





#ascites/concerned this is becoming refractory


   has had paracentesis x 3 (4 liters off recently)


   increased Lasix to 40mg po TID, and Aldactone 200 mg daily


   daily weights/ today is 99.9 kg


   may need TIPS procedure


   fluid has quickly re-accumualted/ Dr Crain is recommending another 

paracentesis tomorrow


   dc soon and have him f/u with Dr Davis/ may need weekly paracentesis


   


# SIRS


   SBP and UTI ruled out


   lactate improved





#Hypotension


   occurring today





#nausea 


   zofran/better today





#electrolyte abnormalities


   on protocol





# acute on chronic renal failure


   resolved


   


# chronic ETOH use


   no s/sx of withdrawal


   he says he has stopped drinking


   


# plan: reviewed his care with DR Crain/ he is recommending another paracentesis 

in a.m./ fluid is quickly re-accumulating


Subjective: Mauro is upset about the size of his abdomen/ not having pain.


Objective: 


 Vital Signs











Temp Pulse Resp BP Pulse Ox


 


 36.6 C   73   16   99/74 L  92 


 


 06/12/17 15:47  06/12/17 15:47  06/12/17 15:47  06/12/17 15:47  06/12/17 15:47








 Laboratory Results





 06/11/17 04:40 





 06/12/17 04:46 





 











 06/11/17 06/12/17 06/13/17





 05:59 05:59 05:59


 


Intake Total 1000 500 


 


Output Total 400  


 


Balance 600 500 








 











PT  22.6 SEC (12.0-15.0)  H  06/11/17  04:40    


 


INR  1.98  (0.83-1.16)  H  06/11/17  04:40    














- Physical Exam


Constitutional: chronically ill appearing, uncomfortable


Eyes: PERRL, icteric sclera


Ears, Nose, Mouth, Throat: hearing normal


Respiratory: no respiratory distress


Gastrointestinal: ascites


Skin: warm, abrasion (small excoriated areas from the patient scratching and 

picking his skin)


Musculoskeletal: generalized weakness


Neurologic: AAOx3


Psychiatric: interacting appropriately, depressed





ICD10 Worksheet


Patient Problems: 


 Problems











Problem Status Onset


 


Hepatorenal failure Acute  


 


Hyperammonemia Acute  


 


Liver cirrhosis Acute  


 


Palliative care encounter Acute  


 


Alcohol dependence Active  


 


Cellulitis Active  


 


Kidney disease Active  


 


Necrotizing fasciitis Active  


 


Acute renal failure Acute  


 


C. difficile diarrhea Acute  ~05/12/17


 


Dyspnea Acute  


 


Edema extremities Acute  


 


HTN (hypertension) Acute  


 


Hyperbilirubinemia Acute  


 


Hyperkalemia Acute  


 


MRSA (methicillin resistant staph aureus) culture positive Acute  07/21/14


 


Pneumonia Acute  


 


Squamous cell cancer of skin of right shoulder Acute  


 


Squamous cell carcinoma Acute  


 


Weakness Acute  


 


Alcoholism Chronic  


 


Gait instability Chronic  


 


Hepatitis C Chronic  


 


Low back pain Chronic

## 2017-06-12 NOTE — SOAPPROG
SOAP Progress Note


Assessment/Plan: 


Assessment:


























Plan:





06/12/17 17:41


A/P


1. Cirrhosis- Secondary to alcohol and hepatitis C. Was drinking prior to 

admission.  Does need screening EGD for varices, but would hold off till more 

stable and consider as outpatient.


2. Ascites- refractory?  Hx of renal insufficiency secondary to diuretics. On 

Lasix 120mg and Aldactone 200mg. Will monitor kidney function. Consider 

increasing dose if kidney function stable. Low salt diet. 


3. Encephalopathy- improved on lactulose.

















Subjective: 





cc: Follow up on cirrhosis





Still complaining of abdominal distension.


Objective: 





 Vital Signs











Temp Pulse Resp BP Pulse Ox


 


 36.6 C   73   16   99/74 L  92 


 


 06/12/17 15:47  06/12/17 15:47  06/12/17 15:47  06/12/17 15:47  06/12/17 15:47








 Laboratory Results





 06/11/17 04:40 





 06/12/17 04:46 





 











 06/11/17 06/12/17 06/13/17





 05:59 05:59 05:59


 


Intake Total 1000 500 


 


Output Total 400  


 


Balance 600 500 








 











PT  22.6 SEC (12.0-15.0)  H  06/11/17  04:40    


 


INR  1.98  (0.83-1.16)  H  06/11/17  04:40    














Physical Exam





- Physical Exam


General Appearance: alert, no apparent distress


EENT: No scleral icterus (R), No scleral icterus (L)


Respiratory: lungs clear, normal breath sounds, No rales, No rhonchi


Cardiac/Chest: regular rate, rhythm, No bradycardia, No tachycardia, No 

diastolic murmur, No systolic murmur


Abdomen: distended (but not tense), No guarding, No rebound, No hernia


Skin: normal color


Neuro/Psych: normal mood/affect, oriented x 3, No abnormal CNs II-XII





ICD10 Worksheet


Patient Problems: 


 Problems











Problem Status Onset


 


Dyspnea Acute  


 


Liver cirrhosis Acute  


 


Hepatorenal failure Acute  


 


Hyperammonemia Acute  


 


Palliative care encounter Acute  


 


C. difficile diarrhea Acute  ~05/12/17


 


Alcohol dependence Active  


 


Kidney disease Active  


 


Cellulitis Active  


 


Necrotizing fasciitis Active  


 


Acute renal failure Acute  


 


Squamous cell carcinoma Acute  


 


Alcoholism Chronic  


 


Gait instability Chronic  


 


Squamous cell cancer of skin of right shoulder Acute  


 


Hyperkalemia Acute  


 


Low back pain Chronic  


 


Hepatitis C Chronic  


 


Edema extremities Acute  


 


HTN (hypertension) Acute  


 


MRSA (methicillin resistant staph aureus) culture positive Acute  07/21/14


 


Pneumonia Acute  


 


Hyperbilirubinemia Acute  


 


Weakness Acute

## 2017-06-13 NOTE — SOAPPROG
SOAP Progress Note


Assessment/Plan: 


Assessment:


























Plan:





06/12/17 17:41


A/P


1. Cirrhosis- Secondary to alcohol and hepatitis C. Was drinking prior to 

admission.  Does need screening EGD for varices, but would hold off till more 

stable and consider as outpatient.


2. Ascites- refractory?  Hx of renal insufficiency secondary to diuretics. On 

Lasix 120mg and Aldactone 200mg. Will monitor kidney function. Consider 

increasing dose if kidney function stable. Low salt diet. 


3. Encephalopathy- improved on lactulose.

















06/13/17 16:02


A/P


1. Cirrhosis- complicated by ascites and hepatic encephalopathy. secondary to 

alcohol and Hepatitis C. Actively drinking. Recommend screening EGD as 

outpatient.


2. Ascites- unsure if refractory. Recommend to increase loop diuretic dose. 

Monitor renal function. Would not consider drain at this time. Would try to 

avoid paracentesis until abdomen tense if possible.


3. Encephalopathy- slight more confused. Titrate lactulose to 3-4 BMs.


06/13/17 16:08





Subjective: 





cc: Follow up on cirrhosis





Slightly more confused.


Objective: 





 Vital Signs











Temp Pulse Resp BP Pulse Ox


 


 36.4 C   69   18   112/93 H  92 


 


 06/13/17 08:48  06/13/17 15:18  06/13/17 15:18  06/13/17 15:18  06/13/17 15:18








 Laboratory Results





 06/11/17 04:40 





 06/13/17 04:40 





 











 06/12/17 06/13/17 06/14/17





 05:59 05:59 05:59


 


Intake Total 500 850 


 


Balance 500 850 








 











PT  21.8 SEC (12.0-15.0)  H  06/13/17  04:40    


 


INR  1.89  (0.83-1.16)  H  06/13/17  04:40    














Physical Exam





- Physical Exam


General Appearance: other (confused)


EENT: No scleral icterus (R), No scleral icterus (L)


Respiratory: lungs clear, normal breath sounds


Cardiac/Chest: regular rate, rhythm, No bradycardia, No tachycardia, No 

diastolic murmur, No systolic murmur


Abdomen: normal bowel sounds, non-tender, soft, distended (but not tense), 

ascites, No guarding, No rebound


Skin: normal color, warm/dry


Neuro/Psych: normal mood/affect, disoriented to place, No abnormal CNs II-XII





ICD10 Worksheet


Patient Problems: 


 Problems











Problem Status Onset


 


Dyspnea Acute  


 


Liver cirrhosis Acute  


 


Hepatorenal failure Acute  


 


Hyperammonemia Acute  


 


Palliative care encounter Acute  


 


C. difficile diarrhea Acute  ~05/12/17


 


Alcohol dependence Active  


 


Kidney disease Active  


 


Cellulitis Active  


 


Necrotizing fasciitis Active  


 


Acute renal failure Acute  


 


Squamous cell carcinoma Acute  


 


Alcoholism Chronic  


 


Gait instability Chronic  


 


Squamous cell cancer of skin of right shoulder Acute  


 


Hyperkalemia Acute  


 


Low back pain Chronic  


 


Hepatitis C Chronic  


 


Edema extremities Acute  


 


HTN (hypertension) Acute  


 


MRSA (methicillin resistant staph aureus) culture positive Acute  07/21/14


 


Pneumonia Acute  


 


Hyperbilirubinemia Acute  


 


Weakness Acute

## 2017-06-13 NOTE — HOSPPROG
Hospitalist Progress Note


Assessment/Plan: 








Patient is a 56 year old male with cirrhosis, chronic ETOH use, chronic HCV 

infection and depression who presented to ED 6/1 with complaint of generalized 

weakness, resulting in mechanical fall. ED evaluation revealed evidence of 

hepatitis, hyperammonemia, hyperbilirubinemia. 








# generalized weakness


   multifactorial


   hepatitis, alcohol use


   PT and OT recommending SNF





# chronic hepatitis, cirrhosis, portal htn


   Ammonia elevated/ on lactulose (increased dose to tid)


   will dc Metoprolol and start on nonselective b blocker (propranolol)


   reviewed his care w Dr Crain/he doesn't feel and EGD is necessary at this time


   MELD shows 20% mortality in 3 months based on recent labs





#ascites/concerned this is becoming refractory


   has had paracentesis x 3 (4 liters off recently)


   increased Lasix to 40mg po TID, and Aldactone 200 mg daily


   daily weights/ today is 95.3 kg (weight is trending down but still has sig 

ascites)


   may need TIPS procedure but ammonia is elevated


   fluid has quickly re-accumulated/ get another paracentesis today


   dc soon and have him f/u with Dr Davis/ may need weekly paracentesis


   


# SIRS


   SBP and UTI ruled out


   lactate improved





#Hypotension


   occurring today





#nausea 


   zofran/better today





#electrolyte abnormalities


   on protocol





# acute on chronic renal failure


   resolved


   


# chronic ETOH use


   no s/sx of withdrawal


   he says he has stopped drinking


   


# plan: get another paracentesis today/ if stable should dc on weds/ have him 

see Dr Davis who is a specialist in this. Palliative met with Mauro and he 

wants to be a DNR but wants treatment.  He is really not showing any 

improvement.  


Subjective: Mauro feels poorly, is weak, his abdomen is causing him to feel 

like breathing is difficult.


Objective: 


 Vital Signs











Temp Pulse Resp BP Pulse Ox


 


 36.4 C   84   18   109/83 H  94 


 


 06/13/17 08:48  06/13/17 10:56  06/13/17 08:48  06/13/17 10:56  06/13/17 08:48








 Laboratory Results





 06/11/17 04:40 





 06/13/17 04:40 





 











 06/12/17 06/13/17 06/14/17





 05:59 05:59 05:59


 


Intake Total 500 850 


 


Balance 500 850 








 











PT  21.8 SEC (12.0-15.0)  H  06/13/17  04:40    


 


INR  1.89  (0.83-1.16)  H  06/13/17  04:40    














- Physical Exam


Constitutional: chronically ill appearing, uncomfortable, unkempt, No no 

apparent distress


Eyes: icteric sclera


Ears, Nose, Mouth, Throat: hearing normal


Cardiovascular: regular rate and rhythym


Respiratory: no respiratory distress


Gastrointestinal: ascites


Skin: warm, abrasion (multiple scabs, scratches on his body)


Musculoskeletal: generalized weakness


Neurologic: AAOx3


Psychiatric: interacting appropriately, depressed





ICD10 Worksheet


Patient Problems: 


 Problems











Problem Status Onset


 


Hepatorenal failure Acute  


 


Hyperammonemia Acute  


 


Liver cirrhosis Acute  


 


Palliative care encounter Acute  


 


Alcohol dependence Active  


 


Cellulitis Active  


 


Kidney disease Active  


 


Necrotizing fasciitis Active  


 


Acute renal failure Acute  


 


C. difficile diarrhea Acute  ~05/12/17


 


Dyspnea Acute  


 


Edema extremities Acute  


 


HTN (hypertension) Acute  


 


Hyperbilirubinemia Acute  


 


Hyperkalemia Acute  


 


MRSA (methicillin resistant staph aureus) culture positive Acute  07/21/14


 


Pneumonia Acute  


 


Squamous cell cancer of skin of right shoulder Acute  


 


Squamous cell carcinoma Acute  


 


Weakness Acute  


 


Alcoholism Chronic  


 


Gait instability Chronic  


 


Hepatitis C Chronic  


 


Low back pain Chronic

## 2017-06-14 NOTE — GDS
[f rep st]



                                                             DISCHARGE SUMMARY





DISCHARGE DIAGNOSES:  

1.  Cirrhosis secondary to alcohol and hepatitis C.

2.  Ascites.

3.  Encephalopathy.

4.  Generalized weakness.

5.  Systemic inflammatory response syndrome.

6.  Nausea.



CONSULTATIONS:  Gastroenterology.



STUDIES AND PROCEDURES:  Multiple paracenteses.



PHYSICAL EXAMINATION:  GENERAL:  The patient is alert.  VITAL SIGNS:  Afebrile at 37.1.  Pulse is 78
.  Respiratory rate is 16.  Blood pressure is 97/67.  He is saturating 89% on room air.  I have seen
 and evaluated the patient on the day of discharge.



HOSPITAL COURSE:  The patient is a 56-year-old male who presented to the emergency room with complai
nts of weakness.  He was evaluated and diagnosed with:

1.  Generalized weakness secondary to multifactorial.

2.  Chronic hepatitis with cirrhosis.  During this hospitalization, he received multiple paracentese
s as well as a consultation from Gastroenterology.  His diuretics have been significantly increased,
 and it appears that the patient is slow to respond to his interventions and medications.  He should
 follow up in the outpatient setting.

3.  Ascites with question if this is refractory.  He has a history of renal insufficiency secondary 
to diuretics.  He will continue his diuretics and [facility]
.  He will continue a low-salt diet.  He has been instructed to discontinue any alcohol use.

4.  Chronic alcoholism.  The patient has been educated at length with regard to the detriment of con
suming alcohol.  He does seem to understand this.  However, he has been educated during past hospita
lizations and has been unable to remain sober.

5.  Encephalopathy.  He will continue lactulose with improvement.



DISPOSITION:  The patient will be discharged to [facility] for skilled nursing rehabilitation.  I
t is unlikely that he will be successful on his own in the outside living environment.  He does seem
 to understand this and has been educated about this.  Discharge, again, will be to [facility]. 



Please refer to EMR form for discharge medications.  Of note, I have increased his Lasix to 120 mg d
aily and his spironolactone to 200 mg daily.  



There are no pending studies.  



I have discussed the patient's disposition with Dr. Crain of Gastroenterology, who is in agreement wi
th this plan, as well as Case Management.  



I spent greater than 35 minutes in the care, coordination, and management of the patient's dispositi
on.  His disposition is guarded.





Job #:  [id number]

## 2017-06-14 NOTE — PDIAF
- Diagnosis


Diagnosis: ascites


Code Status: Do Not Resuscitate





- Medication Management


Discharge Medications: 


 Medications to Continue on Transfer





Gabapentin [Neurontin 300 MG (*)] 1,200 mg PO TID 05/10/17 [Last Taken 06/01/17 

21:00]


Triamcinolone 0.1% [Triamcinolone 0.1% Cream (*)] 1 elizabeth TP BID PRN 05/10/17 [

Last Taken Unknown]


Omeprazole [Prilosec 20 mg] 20 mg PO DAILY 06/02/17 [Last Taken 06/01/17]


Potassium Cl [Klor-Con 20 meq (*)] 20 meq PO DAILY 06/02/17 [Last Taken 06/01/17

]


Folic Acid [Folic Acid 1 MG (*)] 1 mg PO DAILY  tab 06/14/17 [Last Taken Unknown

]


Furosemide [Lasix 40 MG (*)] 40 mg PO TID  tab 06/14/17 [Last Taken Unknown]


Heparin [Heparin SC 5000 unit/0.5 ml (*)] 5,000 unit SC Q8  syr 06/14/17 [Last 

Taken Unknown]


LORazepam [Ativan (*)] 1 mg PO Q4HRS PRN #0 tab 06/14/17 [Last Taken Unknown]


Magnesium Oxide [Magnesium Oxide 400 mg (*)] 400 mg PO DAILY  tab 06/14/17 [

Last Taken Unknown]


Multivitamins [Multivitamin (*)] 1 each PO DAILY  tab 06/14/17 [Last Taken 

Unknown]


Ondansetron  HCl Pf [Zofran 4 mg Inj (*)] 4 mg IVP Q4HRS PRN #0 vial 06/14/17 [

Last Taken Unknown]


Propranolol HCl [Inderal 20mg (*)] 20 mg PO BID  tab 06/14/17 [Last Taken 

Unknown]


Sertraline HCl [Zoloft 50mg (*)] 50 mg PO DAILY  tab 06/14/17 [Last Taken 

Unknown]


Spironolactone [Aldactone] 100 mg PO BID  tab 06/14/17 [Last Taken Unknown]


Thiamine HCl [Vitamin B-1] 100 mg PO DAILY  tab 06/14/17 [Last Taken Unknown]


oxyCODONE IR [Oxycodone Ir (*)] 5 mg PO Q4HRS PRN #0 tab 06/14/17 [Last Taken 

Unknown]


traMADol [Ultram 50 mg (*)] 50 mg PO Q12 PRN #0 tab 06/14/17 [Last Taken Unknown

]








Discharge Medications: Refer to the Discharge Home Medication list for PRN 

reason.


PICC Care - Routine: N/A





- Orders


Services needed: Registered Nurse, Physical Therapy, Occupational Therapy


Diet Recommendation: sodium restricted





- Follow Up Care


Current Providers and Referrals: 


Inessa Vargas DO [Primary Care Provider] - As per Instructions

## 2017-06-14 NOTE — SOAPPROG
SOAP Progress Note


Assessment/Plan: 


Assessment:


























Plan:





06/12/17 17:41


A/P


1. Cirrhosis- Secondary to alcohol and hepatitis C. Was drinking prior to 

admission.  Does need screening EGD for varices, but would hold off till more 

stable and consider as outpatient.


2. Ascites- refractory?  Hx of renal insufficiency secondary to diuretics. On 

Lasix 120mg and Aldactone 200mg. Will monitor kidney function. Consider 

increasing dose if kidney function stable. Low salt diet. 


3. Encephalopathy- improved on lactulose.

















06/13/17 16:02


A/P


1. Cirrhosis- complicated by ascites and hepatic encephalopathy. secondary to 

alcohol and Hepatitis C. Actively drinking. Recommend screening EGD as 

outpatient.


2. Ascites- unsure if refractory. Recommend to increase loop diuretic dose. 

Monitor renal function. Would not consider drain at this time. Would try to 

avoid paracentesis until abdomen tense if possible.


3. Encephalopathy- slight more confused. Titrate lactulose to 3-4 BMs.


06/13/17 16:08





06/14/17 14:18


A/P


1. Cirrhosis- Secondary to etoh and hep c. Actively drinking. Cirrhosis 

complicated by ascites and hepatic encephalopathy. Recommend screening EGD as 

outpatient.


2. Ascites- unsure if refractory. Tapped yesterday. Continue diuretics and low 

salt diet.  Monitor renal function. Would not consider drain at this time. 

Would try to avoid paracentesis until abdomen tense if possible.


3. Encephalopathy-  Titrate lactulose to 3-4 BMs.


Subjective: 





cc: follow up cirrhosis





Alert. No complaints.


Objective: 





 Vital Signs











Temp Pulse Resp BP Pulse Ox


 


 37.1 C   78   16   97/67 L  89 L


 


 06/14/17 07:32  06/14/17 07:32  06/14/17 07:32  06/14/17 07:32  06/14/17 07:32








 Laboratory Results





 06/11/17 04:40 





 06/14/17 04:58 





 











 06/13/17 06/14/17 06/15/17





 05:59 05:59 05:59


 


Intake Total 850 300 


 


Balance 850 300 








 











PT  21.8 SEC (12.0-15.0)  H  06/13/17  04:40    


 


INR  1.89  (0.83-1.16)  H  06/13/17  04:40    














Physical Exam





- Physical Exam


General Appearance: alert, no apparent distress


EENT: No scleral icterus (R), No scleral icterus (L)


Respiratory: lungs clear, normal breath sounds, No rales, No rhonchi


Cardiac/Chest: regular rate, rhythm


Abdomen: non-tender, soft, distended, No guarding, No rebound


Skin: normal color, warm/dry


Neuro/Psych: normal mood/affect, oriented x 3





ICD10 Worksheet


Patient Problems: 


 Problems











Problem Status Onset


 


Hepatorenal failure Acute  


 


Hyperammonemia Acute  


 


Liver cirrhosis Acute  


 


Palliative care encounter Acute  


 


Alcohol dependence Active  


 


Cellulitis Active  


 


Kidney disease Active  


 


Necrotizing fasciitis Active  


 


Acute renal failure Acute  


 


C. difficile diarrhea Acute  ~05/12/17


 


Dyspnea Acute  


 


Edema extremities Acute  


 


HTN (hypertension) Acute  


 


Hyperbilirubinemia Acute  


 


Hyperkalemia Acute  


 


MRSA (methicillin resistant staph aureus) culture positive Acute  07/21/14


 


Pneumonia Acute  


 


Squamous cell cancer of skin of right shoulder Acute  


 


Squamous cell carcinoma Acute  


 


Weakness Acute  


 


Alcoholism Chronic  


 


Gait instability Chronic  


 


Hepatitis C Chronic  


 


Low back pain Chronic

## 2017-10-08 ENCOUNTER — HOSPITAL ENCOUNTER (EMERGENCY)
Dept: HOSPITAL 80 - FED | Age: 57
Discharge: HOME | End: 2017-10-08
Payer: MEDICAID

## 2017-10-08 VITALS
SYSTOLIC BLOOD PRESSURE: 108 MMHG | RESPIRATION RATE: 17 BRPM | HEART RATE: 76 BPM | OXYGEN SATURATION: 97 % | DIASTOLIC BLOOD PRESSURE: 68 MMHG

## 2017-10-08 VITALS — TEMPERATURE: 97.9 F

## 2017-10-08 DIAGNOSIS — S81.011A: Primary | ICD-10-CM

## 2017-10-08 DIAGNOSIS — I11.0: ICD-10-CM

## 2017-10-08 DIAGNOSIS — I50.9: ICD-10-CM
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
## 2017-10-08 PROCEDURE — 0HQKXZZ REPAIR RIGHT LOWER LEG SKIN, EXTERNAL APPROACH: ICD-10-PCS

## 2017-10-08 PROCEDURE — L1830 KO IMMOB CANVAS LONG PRE OTS: HCPCS

## 2017-10-08 NOTE — EDPHY
General


Narrative: 





CHIEF COMPLAINT: 


Right knee laceration, fall





HISTORY OF PRESENT ILLNESS: 


Patient complains of right knee laceration after falling at home this morning.  

He says somewhere after 6:00 a.m. he tripped and fell, landing on his right 

knee.  This was a minor fall.  He has no significant pain from this, only minor 

discomfort at the site of the laceration.  Bleeding was moderate at the time 

but stopped easily with pressure.  He has no numbness or tingling distally.  He 

has significant previous trauma in surgical intervention to the right lower leg 

below this.  No head or neck injury.  No chest or back pain.  No abdominal 

pain.  No pain in the left leg or either arm.  Pain is minimal at rest.  It is 

mild to moderate when he walks.  He does use a cane.  No other associated 

complaints or modifying factors.





TIME OF INJURY:


6:00 a.m. this morning





TETANUS STATUS:


One year ago





MEDICAL/SURGICAL/SOCIAL HISTORY:


Cirrhosis, GERD, neuropathy, CHF, hypertension, previous alcohol abuse.


Right lower extremity excision debridement due to complications from brown 

recluse bite last year.





REVIEW OF SYSTEMS:


Ten systems reviewed and are negative unless otherwise noted in the HPI





EXAMINATION


General Appearance:  Alert, no distress


Head: normocephalic, atraumatic


Eyes: PERRL. EOM intact


ENT:  Airway widely patent.


Cardiovascular:  Pulses normal throughout.  Symmetric DP pulses 2+.  Symmetric 

PT pulses 2+.  Brisk cap refill


Neurological:  A&O, sensory symmetric, strength symmetric.  Baseline sensory of 

the lower extremities per patient.  No footdrop.


Skin:  Warm and dry, no rash.  There is a complex laceration of the right 

anterior knee, this is just infra patellar, 6 cm total length.  No exposure of 

the underlying fascia or patella bone.  No foreign body by direct examination 

with sterile glove.


Extremities:  Tenderness of the area of the right knee laceration.  Range of 

motion is intact of the lower extremities symmetrically.  Neurovascular intact 

distal to the area of laceration.  There is chronic skin change to the right 

anterior shin from surgical intervention last year.





DIFFERENTIAL DIAGNOSES:


Including but not limited to laceration, complex laceration, laceration with 

tendon injury, open fracture








MDM:


5:50 p.m.


Mechanical fall with right knee laceration.  Range of motion is intact in both 

extremities.  He is neuro baseline.  Laceration has been anesthetized.  Proceed 

with x-ray and irrigation.





6:25 p.m.


X-ray as read by me reveals degenerative changes with some subcu air near the 

laceration.  I do not appreciate a fracture.  I do not appreciate air in the 

joint.  Wound has been irrigated.  I will proceed with closure





7:00 p.m.


Complex knee laceration has been repaired with good approximation of the wound 

borders, particularly due to the patient's chronic skin changes.  Due to the 

complexity he will need to be in straight leg immobilizer/immobilized for the 

next 5-7 days.  Because he cannot use crutches I will place him in an 

immobilizer and he may use his cane.  We will attempt to road test him here for 

safety.





7:35 p.m.


Complex knee laceration with no joint involvement.  He has ambulated here with 

success using his cane.  I do feel he is stable for discharge home.  I did 

offer admission for observation but he has declined.  He will be discharged 

home stable condition.  He informs me that he has home health coming this week.

  There is also a request for case management to follow up with him tomorrow to 

ensure that he has home health and any further DME that he will require.  He is 

comfortable with this.  He is discharged in stable condition.





PROCEDURE: Laceration repair


Consent:  Verbal


Location:  Right knee, anterior


Length of repair:  6 cm


Complexity:  Complex


Layer involvement:  2 layer


Anesthesia:  Local.  1% lidocaine without epinephrine, 8 mL


Irrigation:  Extensive


Debridement:  None


Procedure description:  Following good anesthesia, the wound was copiously 

irrigated.  Wound bed was explored and there is no foreign body noted.  Wound 

borders were approximated well with good hemostasis.  Tolerated well without 

complication.


Suture/Staple material:  Subcutaneous layer:  6 running Vicryl sutures.  

Epidermal layer:  10 horizontal mattresses in 2 simple interrupted sutures.


Wound care:  Routine as discussed


Suture/Staple removal: 10-14  Days





ED Precautions: 


Worsening pain. Erythema, edema, cyanosis, pallor, paresthesia or anesthesia.











- Diagnostics


Imaging Results: 


 Imaging Impressions





Knee X-Ray  10/08/17 17:51


Impression:


1. Laceration superficial to the patellar tendon without evidence of underlying 

radiopaque foreign body.


2. Mild medial compartment knee joint space narrowing with small medial 

marginal osteophytes that is progressed slightly since the prior study.














- History


Smoking Status: Never smoked





- Objective


Vital Signs: 


 Initial Vital Signs











Temperature (C)  97.9 F   10/08/17 18:41


 


Heart Rate  71   10/08/17 18:41


 


Respiratory Rate  16   10/08/17 18:41


 


Blood Pressure  100/60   10/08/17 18:41


 


O2 Sat (%)  99   10/08/17 18:41








 











O2 Delivery Mode               Room Air














Allergies/Adverse Reactions: 


 





No Known Allergies Allergy (Verified 10/08/17 18:40)


 








Home Medications: 














 Medication  Instructions  Recorded


 


Gabapentin [Neurontin 300 MG (*)] 1,200 mg PO TID 05/10/17


 


Triamcinolone 0.1% [Triamcinolone 1 elizabeth TP BID PRN 05/10/17





0.1% Cream (*)]  


 


Omeprazole [Prilosec 20 mg] 20 mg PO DAILY 06/02/17


 


Potassium Cl [Klor-Con 20 meq (*)] 20 meq PO DAILY 06/02/17


 


Folic Acid [Folic Acid 1 MG (*)] 1 mg PO DAILY  tab 06/14/17


 


Furosemide [Lasix 40 MG (*)] 40 mg PO TID  tab 06/14/17


 


Heparin [Heparin SC 5000 unit/0.5 5,000 unit SC Q8  syr 06/14/17





ml (*)]  


 


LORazepam [Ativan (*)] 1 mg PO Q4HRS PRN #0 tab 06/14/17


 


Magnesium Oxide [Magnesium Oxide 400 mg PO DAILY  tab 06/14/17





400 mg (*)]  


 


Multivitamins [Multivitamin (*)] 1 each PO DAILY  tab 06/14/17


 


Ondansetron  HCl Pf [Zofran 4 mg 4 mg IVP Q4HRS PRN #0 vial 06/14/17





Inj (*)]  


 


Propranolol HCl [Inderal 20mg (*)] 20 mg PO BID  tab 06/14/17


 


Sertraline HCl [Zoloft 50mg (*)] 50 mg PO DAILY  tab 06/14/17


 


Spironolactone [Aldactone] 100 mg PO BID  tab 06/14/17


 


Thiamine HCl [Vitamin B-1] 100 mg PO DAILY  tab 06/14/17


 


oxyCODONE IR [Oxycodone Ir (*)] 5 mg PO Q4HRS PRN #0 tab 06/14/17


 


traMADol [Ultram 50 mg (*)] 50 mg PO Q12 PRN #0 tab 06/14/17


 


Amoxicillin/Clavulanate Pot 875 mg PO BID #20 tab 10/08/17





[Augmentin 875 MG TAB (*)]  














Departure





- Departure


Disposition: Home, Routine, Self-Care


Clinical Impression: 


Laceration of knee, right, complicated


Qualifiers:


 Encounter type: initial encounter Qualified Code(s): S81.011A - Laceration 

without foreign body, right knee, initial encounter





Fall


Qualifiers:


 Encounter type: initial encounter Qualified Code(s): W19.XXXA - Unspecified 

fall, initial encounter





Condition: Good


Instructions:  Care For Your Stitches (ED), Laceration (ED)


Additional Instructions: 


1. Daily wound care as discussed


2. ED precautions for signs of infection as discussed


3. Follow up with primary care physician or this emergency department in 10 

days for suture removal


Referrals: 


Patient,NotPresent [Unknown] - As per Instructions


[facility],. [Clinic] - As per Instructions


Inessa Vargas DO [Primary Care Provider] - As per Instructions


Prescriptions: 


Amoxicillin/Clavulanate Pot [Augmentin 875 MG TAB (*)] 875 mg PO BID #20 tab

## 2017-10-09 NOTE — ASMTCMCOM
CM Note

 

CM Note                       

Notes:

Received a note requesting help with calling in a prescription for the patient. No pharmacy 

information was provided. Called phone number provided in demographics sheet but it belongs to a 

friend of the patient, Cristóbal Marion ([phone]). Cristóbal states he is not sure how to get a hold of 

the patient but that it is okay for his name and phone number to still be listed under patient's 

emergency contact list. 



Patient's address is at the [facility] ([phone]) [facility] (through [employer]). This CM called [phone] and spoke patient's CM Daniela Fajardo. She was able to go to 

patient's apartment and let him know I was trying to reach him and provided this CM phone #. As 

this time patient still has not contacted this CM. 



Patient gets his prescriptions filled through [facility] and the prescription needs to be 

written by one of their providers in order to get it filled at their discounted cost. Spoke with 

BRIEN Dennis at [facility] and she requested I fax the prescription to them and they will 

have it re-written and filled for the patient. 



Spoke with Yasmany at [facility] ([phone]) and she will put a note in patient's chart to 

try to contact him for follow up appointment. 

 

Date Signed:  10/09/2017 05:23 PM

Electronically Signed By:Stephanie Richards RN

## 2017-10-09 NOTE — ASDISCHSUM
----------------------------------------------

Discharge Information

----------------------------------------------

Plan Status:Home with No Needs                       Medically Cleared to Leave:

Discharge Date:10/08/2017 07:47 PM                   CM D/C Disposition:Home, Routine, Self-Care

ADT D/C Disposition:Home, Routine, Self-Care         Projected Discharge Date:10/08/2017 07:47 PM

Transportation at D/C:Self                           Discharge Delay Reason:

Follow-Up Date:10/08/2017 07:47 PM                   Discharge Slot:

Final Diagnosis:

----------------------------------------------

Placement Information

----------------------------------------------

----------------------------------------------

Patient Contact Information

----------------------------------------------

Contact Name:MONICA                            Relationship:Sister

Address:                                             Home [phone]

                                                     Work Phone:

City:                                                [facility] Phone:

State/Zip Code:                                      Email:

----------------------------------------------

Financial Information

----------------------------------------------

Financial Class:MD

Primary Plan Desc:[employer] [place]        Primary Plan Number:[id number]

Secondary Plan Desc:                                 Secondary Plan Number:

 

 

----------------------------------------------

Assessment Information

----------------------------------------------

----------------------------------------------

[facility] Progress Note

----------------------------------------------

CM Note

 

CM Note                       

Notes:

Received a note requesting help with calling in a prescription for the patient. No pharmacy 

information was provided. Called phone number provided in demographics sheet but it belongs to a 

friend of the patient, Cristóbal Marion ([phone]). Cristóbal states he is not sure how to get a hold of 

the patient but that it is okay for his name and phone number to still be listed under patient's 

emergency contact list. 



Patient's address is at the [facility] ([phone]) [facility], [place], operated by [facility] (through [facility]). This CM called [phone] and spoke patient's CM Daniela Fajardo. She was able to go to 

patient's apartment and let him know I was trying to reach him and provided this CM phone #. As 

this time patient still has not contacted this CM. 



Patient gets his prescriptions filled through [facility] and the prescription needs to be 

written by one of their providers in order to get it filled at their discounted cost. Spoke with 

BRIEN Dennis at [facility] and she requested I fax the prescription to them and they will 

have it re-written and filled for the patient. 



Spoke with Yasmany at [facility] ([phone]) and she will put a note in patient's chart to 

try to contact him for follow up appointment. 

 

Date Signed:  10/09/2017 05:23 PM

Electronically Signed By:Stephanie Richards RN

 

 

----------------------------------------------

Intervention Information

----------------------------------------------

## 2017-12-04 ENCOUNTER — HOSPITAL ENCOUNTER (INPATIENT)
Dept: HOSPITAL 80 - FED | Age: 57
LOS: 9 days | Discharge: NURSING, REHAB OR LONG-TERM CARE | DRG: 602 | End: 2017-12-13
Attending: INTERNAL MEDICINE | Admitting: INTERNAL MEDICINE
Payer: MEDICAID

## 2017-12-04 DIAGNOSIS — G89.29: ICD-10-CM

## 2017-12-04 DIAGNOSIS — L08.9: Primary | ICD-10-CM

## 2017-12-04 DIAGNOSIS — I10: ICD-10-CM

## 2017-12-04 DIAGNOSIS — G93.40: ICD-10-CM

## 2017-12-04 DIAGNOSIS — Z66: ICD-10-CM

## 2017-12-04 DIAGNOSIS — K70.30: ICD-10-CM

## 2017-12-04 DIAGNOSIS — R78.81: ICD-10-CM

## 2017-12-04 DIAGNOSIS — K72.90: ICD-10-CM

## 2017-12-04 DIAGNOSIS — B19.20: ICD-10-CM

## 2017-12-04 DIAGNOSIS — D53.9: ICD-10-CM

## 2017-12-04 DIAGNOSIS — A04.72: ICD-10-CM

## 2017-12-04 DIAGNOSIS — F10.21: ICD-10-CM

## 2017-12-04 DIAGNOSIS — K74.69: ICD-10-CM

## 2017-12-04 DIAGNOSIS — B95.62: ICD-10-CM

## 2017-12-04 LAB
% IMMATURE GRANULYOCYTES: 0.2 % (ref 0–1.1)
ABSOLUTE IMMATURE GRANULOCYTES: 0.01 10^3/UL (ref 0–0.1)
ABSOLUTE NRBC COUNT: 0 10^3/UL (ref 0–0.01)
ADD DIFF?: NO
ADD MORPH?: NO
ADD SCAN?: NO
ALBUMIN SERPL-MCNC: 3.3 G/DL (ref 3.5–5)
ALBUMIN SERPL-MCNC: 3.3 G/DL (ref 3.5–5)
ALP SERPL-CCNC: 62 IU/L (ref 38–126)
ALP SERPL-CCNC: 63 IU/L (ref 38–126)
ALT SERPL-CCNC: 54 IU/L (ref 21–72)
ALT SERPL-CCNC: 58 IU/L (ref 21–72)
ANION GAP SERPL CALC-SCNC: 10 MEQ/L (ref 8–16)
APTT BLD: 38.9 SEC (ref 23–38)
AST SERPL-CCNC: 84 IU/L (ref 17–59)
AST SERPL-CCNC: 85 IU/L (ref 17–59)
ATYPICAL LYMPHOCYTE FLAG: 0 (ref 0–99)
BILIRUB SERPL-MCNC: 1.7 MG/DL (ref 0.1–1.4)
BILIRUB SERPL-MCNC: 1.7 MG/DL (ref 0.1–1.4)
BILIRUBIN-CONJUGATED: 0.7 MG/DL (ref 0–0.5)
BILIRUBIN-UNCONJUGATED: 1 MG/DL (ref 0–1.1)
CALCIUM SERPL-MCNC: 9.8 MG/DL (ref 8.5–10.4)
CHLORIDE SERPL-SCNC: 109 MEQ/L (ref 97–110)
CO2 SERPL-SCNC: 24 MEQ/L (ref 22–31)
COLOR UR: YELLOW
CREAT SERPL-MCNC: 0.9 MG/DL (ref 0.7–1.3)
ERYTHROCYTE [DISTWIDTH] IN BLOOD BY AUTOMATED COUNT: 15.3 % (ref 11.5–15.2)
FRAGMENT RBC FLAG: 0 (ref 0–99)
GFR SERPL CREATININE-BSD FRML MDRD: > 60 ML/MIN/{1.73_M2}
GLUCOSE SERPL-MCNC: 107 MG/DL (ref 70–100)
HCT VFR BLD CALC: 31.9 % (ref 40–51)
HGB BLD-MCNC: 11.4 G/DL (ref 13.7–17.5)
INR PPP: 1.3 (ref 0.83–1.16)
LEFT SHIFT FLG: 0 (ref 0–99)
LIPEMIA HEMOLYSIS FLAG: 90 (ref 0–99)
MCH RBC BLDCO QN: 33.1 PG (ref 27.9–34.1)
MCHC RBC AUTO-ENTMCNC: 35.7 G/DL (ref 32.4–36.7)
MCV RBC AUTO: 92.7 FL (ref 81.5–99.8)
MUCOUS THREADS #/AREA URNS LPF: (no result) /LPF
NITRITE UR QL STRIP: NEGATIVE
NRBC-AUTO%: 0 % (ref 0–0.2)
PH UR STRIP: 7 [PH] (ref 5–7.5)
PLATELET # BLD: 152 10^3/UL (ref 150–400)
PLATELET CLUMPS FLAG: 0 (ref 0–99)
PMV BLD AUTO: 11.9 FL (ref 8.7–11.7)
POTASSIUM SERPL-SCNC: 4.3 MEQ/L (ref 3.5–5.2)
PROT SERPL-MCNC: 7.3 G/DL (ref 6.3–8.2)
PROT SERPL-MCNC: 7.4 G/DL (ref 6.3–8.2)
PROTHROMBIN TIME: 16.4 SEC (ref 12–15)
RBC # BLD AUTO: 3.44 10^6/UL (ref 4.4–6.38)
RBC #/AREA URNS HPF: (no result) /HPF (ref 0–3)
SODIUM SERPL-SCNC: 143 MEQ/L (ref 134–144)
SP GR UR STRIP: 1.01 (ref 1–1.03)
WBC #/AREA URNS HPF: (no result) /HPF (ref 0–3)

## 2017-12-04 PROCEDURE — C1751 CATH, INF, PER/CENT/MIDLINE: HCPCS

## 2017-12-04 PROCEDURE — A9585 GADOBUTROL INJECTION: HCPCS

## 2017-12-04 RX ADMIN — LACTULOSE SCH GM: 20 SOLUTION ORAL at 22:21

## 2017-12-04 RX ADMIN — PROPRANOLOL HYDROCHLORIDE SCH MG: 20 TABLET ORAL at 22:21

## 2017-12-04 RX ADMIN — ACETAMINOPHEN PRN MG: 325 TABLET ORAL at 22:20

## 2017-12-04 NOTE — EDPHY
H & P


Time Seen by Provider: 12/04/17 12:40


HPI/ROS: 





Chief complaint.  Possible GI bleed





HPI.  57-year-old male here by EMS with several week history of diarrhea and 

vomiting.  The patient tells me "I got hep C ".  He appears confused.  He 

apparently has been having diarrhea for a few weeks without blood.  He has also 

been having vomiting for a few weeks without blood.  Progressive weakness.  

Denies abdominal pain.  Denies fever, shortness of breath, chest discomfort.  

Unclear whether he has had this before.  Review of records shows the patient to 

have cirrhosis.  He is supposed to be taking lactulose but it is unclear 

whether he is taking this.  Paperwork appears that he has been discharged from 

[facility] in September and it is unclear whether he has had further care.





ROS


Constitutional.  no fever/chills, no weakness


Eyes.  no problems with vision


ENT.  no sore throat, no nasal drainage


Cardiovascular.  no chest pain


Respiratory.  no shortness of breath, no cough


Abdominal.  Vomiting and diarrhea


.  no problems urinating


MS.  no calf pain/swelling, no neck/back pain, no joint pain


Skin.  no rash


Lymph.  no swollen glands


Neuro.  Confusion


Past Medical/Surgical History: 





Past medical history is significant for psoriatic arthritis, alcoholism, 

depression, cirrhosis, hepatitis-C, ascites, necrotizing fasciitis, hypertension


Social History: 





The while asking the patient is social history he tells me "just [place] ".  

This is the answer to smoking, drinking, living situation.  He does tell me he 

has a residence but otherwise can't tell me if he lives with friends or family 

or by cell


Smoking Status: Never smoked


Physical Exam: 





General Appearance:  Alert well-developed male moderate distress vital signs 

are stable


Eyes: Pupils equal and round no pallor or injection.


ENT, Mouth:  Mucous membranes are moist.


Respiratory:  There are no retractions, lungs are clear to auscultation.


Cardiovascular: Regular rate and rhythm.


Gastrointestinal:   Abdomen is soft and nontender, no masses, bowel sounds 

normal.  Rectal exam shows brown stool


Neurological:  Awake and alert, sensory and motor exams grossly normal.


Skin: Warm and dry, no rashes.


Musculoskeletal:  Neck is supple nontender.


Extremities  symmetrical, full range of motion.


Psychiatric: Patient is oriented X 1; there is no agitation.


Constitutional: 


 Initial Vital Signs











Temperature (C)  36.0 C   12/04/17 12:28


 


Heart Rate  75   12/04/17 12:28


 


Respiratory Rate  16   12/04/17 12:28


 


Blood Pressure  108/62   12/04/17 12:28


 


O2 Sat (%)  97   12/04/17 12:28








 











O2 Delivery Mode               Nasal Cannula


 


O2 (L/minute)                  2














Allergies/Adverse Reactions: 


 





Unable to Assess Allergy (Verified 12/04/17 12:25)


 








Home Medications: 














 Medication  Instructions  Recorded


 


Cholestyramine Packet  12/04/17


 


GABAPENTIN  12/04/17


 


LACTULOSE  12/04/17


 


LORAZEPAM  12/04/17


 


Lasix  12/04/17


 


Propranolol Sr  12/04/17


 


Sertraline HCl  12/04/17


 


Spironolactone  12/04/17


 


oxyCODONE CR  12/04/17


 


traMADol  12/04/17














Medical Decision Making





- Diagnostics


EKG Interpretation: 





EKG interpreted by me shows normal sinus rhythm with normal interval and axis.  

QRS shows a right bundle branch block.  No significant ST elevation or 

depression.  No arrhythmia.  The rate is 67


Imaging Results: 


 Imaging Impressions





Chest X-Ray  12/04/17 13:49


Impression: Findings suggestion underlying bronchitis with possible early 

pneumonia at both lung bases.


 








Head CT  12/04/17 13:49


Impression:


1. Mild cerebral atrophy.


2. Mild microvascular ischemic gliosis.


3. No acute hemorrhage, mass effect, hydrocephalus, or definite acute infarct.


4. Cerebrovascular atherosclerosis.


5. Moderate right ethmoid sinusitis.


 


Findings and recommendations discussed with Emergency Department physician, 

Dane Sauceda at 1421 hour, 12/4/2017.


 


Final report concurs with initial preliminary interpretation.








Head CT reviewed by me and discussed with Dr. Mccormack shows atrophy but no 

intracranial bleeding





One-view chest x-ray interpreted by me as right-sided pneumonia


Procedures: 





IV normal saline, blood cultures, monitor





Zithromax and Rocephin intravenously


ED Course/Re-evaluation: 





Serial evaluations patient's condition remains unchanged and patient remained 

somewhat confused.  He and I did discuss his laboratory and imaging studies.  

We discussed the need for him to stay in the hospital.  He expresses 

understanding and agreement





I consulted and discussed the case with Dr. Mckinnon, hospitalist, who agrees to 

the admission.  She asked me to add a pro calcitonin to his lab work


Differential Diagnosis: 





I considered causes of confusion including intracranial bleeding, sepsis, 

elevated ammonia level.  It appears the patient has pneumonia on his chest x-

ray.  He is not given us a stool sample for stool studies or urinalysis yet.





- Data Points


Laboratory Results: 


 Laboratory Results





 12/04/17 12:20 





 12/04/17 12:20 





 











  12/04/17 12/04/17 12/04/17





  15:05 13:50 13:45


 


WBC      





    


 


RBC      





    


 


Hgb      





    


 


Hct      





    


 


MCV      





    


 


MCH      





    


 


MCHC      





    


 


RDW      





    


 


Plt Count      





    


 


MPV      





    


 


Neut % (Auto)      





    


 


Lymph % (Auto)      





    


 


Mono % (Auto)      





    


 


Eos % (Auto)      





    


 


Baso % (Auto)      





    


 


Nucleat RBC Rel Count      





    


 


Absolute Neuts (auto)      





    


 


Absolute Lymphs (auto)      





    


 


Absolute Monos (auto)      





    


 


Absolute Eos (auto)      





    


 


Absolute Basos (auto)      





    


 


Absolute Nucleated RBC      





    


 


Immature Gran %      





    


 


Immature Gran #      





    


 


PT      





    


 


INR      





    


 


APTT      





    


 


VBG Lactic Acid    1.8 mmol/L mmol/L  





    (0.7-2.1)  


 


Sodium      





    


 


Potassium      





    


 


Chloride      





    


 


Carbon Dioxide      





    


 


Anion Gap      





    


 


BUN      





    


 


Creatinine      





    


 


Estimated GFR      





    


 


Glucose      





    


 


Calcium      





    


 


Total Bilirubin      





    


 


Conjugated Bilirubin      





    


 


Unconjugated Bilirubin      





    


 


AST      





    


 


ALT      





    


 


Alkaline Phosphatase      





    


 


Ammonia  30.0 uMOL/L uMOL/L    





   (9.0-30.0)   


 


Total Protein      





    


 


Albumin      





    


 


Lipase      





    


 


Stool Occult Bld Scrn      NEGATIVE 





     (NEGATIVE) 














  12/04/17 12/04/17 12/04/17





  12:20 12:20 12:20


 


WBC      





    


 


RBC      





    


 


Hgb      





    


 


Hct      





    


 


MCV      





    


 


MCH      





    


 


MCHC      





    


 


RDW      





    


 


Plt Count      





    


 


MPV      





    


 


Neut % (Auto)      





    


 


Lymph % (Auto)      





    


 


Mono % (Auto)      





    


 


Eos % (Auto)      





    


 


Baso % (Auto)      





    


 


Nucleat RBC Rel Count      





    


 


Absolute Neuts (auto)      





    


 


Absolute Lymphs (auto)      





    


 


Absolute Monos (auto)      





    


 


Absolute Eos (auto)      





    


 


Absolute Basos (auto)      





    


 


Absolute Nucleated RBC      





    


 


Immature Gran %      





    


 


Immature Gran #      





    


 


PT    16.4 SEC H SEC  





    (12.0-15.0)  


 


INR    1.30  H   





    (0.83-1.16)  


 


APTT    38.9 SEC H SEC  





    (23.0-38.0)  


 


VBG Lactic Acid      





    


 


Sodium      143 mEq/L mEq/L





     (134-144) 


 


Potassium      4.3 mEq/L mEq/L





     (3.5-5.2) 


 


Chloride      109 mEq/L mEq/L





     () 


 


Carbon Dioxide      24 mEq/l mEq/l





     (22-31) 


 


Anion Gap      10 mEq/L mEq/L





     (8-16) 


 


BUN      40 mg/dL H mg/dL





     (7-23) 


 


Creatinine      0.9 mg/dL mg/dL





     (0.7-1.3) 


 


Estimated GFR      > 60 





    


 


Glucose      107 mg/dL H mg/dL





     () 


 


Calcium      9.8 mg/dL mg/dL





     (8.5-10.4) 


 


Total Bilirubin  1.7 mg/dL H mg/dL    1.7 mg/dL H mg/dL





   (0.1-1.4)    (0.1-1.4) 


 


Conjugated Bilirubin  0.7 mg/dL H mg/dL    





   (0.0-0.5)   


 


Unconjugated Bilirubin  1.0 mg/dL mg/dL    





   (0.0-1.1)   


 


AST  85 IU/L H IU/L    84 IU/L H IU/L





   (17-59)    (17-59) 


 


ALT  54 IU/L IU/L    58 IU/L IU/L





   (21-72)    (21-72) 


 


Alkaline Phosphatase  62 IU/L IU/L    63 IU/L IU/L





   ()    () 


 


Ammonia      





    


 


Total Protein  7.4 g/dL g/dL    7.3 g/dL g/dL





   (6.3-8.2)    (6.3-8.2) 


 


Albumin  3.3 g/dL L g/dL    3.3 g/dL L g/dL





   (3.5-5.0)    (3.5-5.0) 


 


Lipase  308 IU/L H IU/L    





   ()   


 


Stool Occult Bld Scrn      





    














  12/04/17





  12:20


 


WBC  5.89 10^3/uL 10^3/uL





   (3.80-9.50) 


 


RBC  3.44 10^6/uL L 10^6/uL





   (4.40-6.38) 


 


Hgb  11.4 g/dL L g/dL





   (13.7-17.5) 


 


Hct  31.9 % L %





   (40.0-51.0) 


 


MCV  92.7 fL fL





   (81.5-99.8) 


 


MCH  33.1 pg pg





   (27.9-34.1) 


 


MCHC  35.7 g/dL g/dL





   (32.4-36.7) 


 


RDW  15.3 % H %





   (11.5-15.2) 


 


Plt Count  152 10^3/uL 10^3/uL





   (150-400) 


 


MPV  11.9 fL H fL





   (8.7-11.7) 


 


Neut % (Auto)  67.0 % %





   (39.3-74.2) 


 


Lymph % (Auto)  17.0 % %





   (15.0-45.0) 


 


Mono % (Auto)  14.3 % H %





   (4.5-13.0) 


 


Eos % (Auto)  1.0 % %





   (0.6-7.6) 


 


Baso % (Auto)  0.5 % %





   (0.3-1.7) 


 


Nucleat RBC Rel Count  0.0 % %





   (0.0-0.2) 


 


Absolute Neuts (auto)  3.95 10^3/uL 10^3/uL





   (1.70-6.50) 


 


Absolute Lymphs (auto)  1.00 10^3/uL 10^3/uL





   (1.00-3.00) 


 


Absolute Monos (auto)  0.84 10^3/uL H 10^3/uL





   (0.30-0.80) 


 


Absolute Eos (auto)  0.06 10^3/uL 10^3/uL





   (0.03-0.40) 


 


Absolute Basos (auto)  0.03 10^3/uL 10^3/uL





   (0.02-0.10) 


 


Absolute Nucleated RBC  0.00 10^3/uL 10^3/uL





   (0-0.01) 


 


Immature Gran %  0.2 % %





   (0.0-1.1) 


 


Immature Gran #  0.01 10^3/uL 10^3/uL





   (0.00-0.10) 


 


PT  





  


 


INR  





  


 


APTT  





  


 


VBG Lactic Acid  





  


 


Sodium  





  


 


Potassium  





  


 


Chloride  





  


 


Carbon Dioxide  





  


 


Anion Gap  





  


 


BUN  





  


 


Creatinine  





  


 


Estimated GFR  





  


 


Glucose  





  


 


Calcium  





  


 


Total Bilirubin  





  


 


Conjugated Bilirubin  





  


 


Unconjugated Bilirubin  





  


 


AST  





  


 


ALT  





  


 


Alkaline Phosphatase  





  


 


Ammonia  





  


 


Total Protein  





  


 


Albumin  





  


 


Lipase  





  


 


Stool Occult Bld Scrn  





  











Medications Given: 


 





Azithromycin 500 mg/ Dextrose  255 mls @ 255 mls/hr IV EDNOW ONE


   PRN Reason: Protocol


   Stop: 12/04/17 15:51


   Last Admin: 12/04/17 15:38 Dose:  255 mls





Discontinued Medications





Sodium Chloride (Ns)  1,000 mls @ 0 mls/hr IV ONCE ONE


   PRN Reason: Wide Open


   Stop: 12/04/17 13:02


   Last Admin: 12/04/17 13:22 Dose:  1,000 mls


Sodium Chloride (Ns)  1,000 mls @ 0 mls/hr IV EDNOW ONE; Wide Open


   PRN Reason: Protocol


   Stop: 12/04/17 13:49


   Last Admin: 12/04/17 13:56 Dose:  Not Given


Ceftriaxone Sodium/Dextrose (Rocephin 1 Gm (Premix))  50 mls @ 100 mls/hr IV 

EDNOW ONE


   PRN Reason: Protocol


   Stop: 12/04/17 15:20


   Last Admin: 12/04/17 15:19 Dose:  50 mls








Departure





- Departure


Disposition: [facility]


Clinical Impression: 


Altered mental status


Qualifiers:


 Altered mental status type: disorientation Qualified Code(s): R41.0 - 

Disorientation, unspecified





Pneumonia


Qualifiers:


 Pneumonia type: due to unspecified organism Laterality: bilateral Lung location

: lower lobe of lung Qualified Code(s): J18.9 - Pneumonia, unspecified organism





Condition: Fair


Referrals: 


Inessa Vargas DO [Primary Care Provider] - As per Instructions

## 2017-12-04 NOTE — CPEKG
Heart Rate: 67

RR Interval: 896

P-R Interval: 148

QRSD Interval: 144

QT Interval: 456

QTC Interval: 482

P Axis: 76

QRS Axis: 61

T Wave Axis: 40

EKG Severity - ABNORMAL ECG -

EKG Impression: SINUS RHYTHM

EKG Impression: RIGHT BUNDLE BRANCH BLOCK

Electronically Signed By: Dane Sauceda 04-Dec-2017 17:57:30

## 2017-12-04 NOTE — GHP
[f 
rep st]



                                                            HISTORY AND PHYSICAL





DATE OF ADMISSION:  12/04/2017



CHIEF COMPLAINT:  Encephalopathy.



HISTORY OF PRESENT ILLNESS:  A 57-year-old male with history of EtOH and 
hepatitis C cirrhosis, brought in by EMS with several weeks of diarrhea and 
vomiting.  The patient is a very poor historian.  Most of my history is 
obtained from his past medical records.  He appears confused and does not 
answer questions linearly during my interview.  He says he has had a fever, but 
cannot tell me when or how high.  He has had a dry cough, plus myalgias.  He 
states that he has been having nausea, vomiting, diarrhea for months along with 
abdominal pain.  He says that it is sharp, and it is everywhere.  He denies any 
hematemesis or hematochezia.  No headaches.  No chest pain or shortness of 
breath.  No lower extremity edema. 



He has not been taking his lactulose.  He states that he has been taking his 
pills as prescribed, uses a pill box.  He states he has not been taking more 
pain medications than normal.  He has had prior hospitalizations in the past 
due to encephalopathy.



REVIEW OF SYSTEMS:  Again, poor historian.  Reviewed prior records.



PAST MEDICAL HISTORY:  Microcytic anemia, cirrhosis secondary to hepatitis C, 
alcohol, portal hypertension, depression, history of C difficile, history of 
necrotizing fasciitis right lower extremity.



PAST SURGICAL HISTORY:  Right leg for fasciitis.



FAMILY HISTORY:  Mother with Alzheimer and major depression disorder.



SOCIAL HISTORY:  Lives with a roommate here in [place].  He denies alcohol, 
tobacco, or illicits.



HOME MEDS:  Awaiting reconciliation, but he takes:  Tramadol, spironolactone, 
sertraline, propranolol, oxycodone, lorazepam, Lasix, lactulose, gabapentin, 
cholestyramine.



ALLERGIES:  Unknown.



PHYSICAL EXAM:  VITAL SIGNS:  Temperature 36, blood pressure 110/68, heart rate 
in the 60s to 70s, respirations 16, 100% on 2 L.  GENERAL:  Disheveled, appears 
confused, lying on his side.  HEENT:  PERRLA.  Dry mucous membranes.  
Oropharynx is clear.  CV:  His heart is rate and rhythm.  No murmurs, gallops, 
or rubs.  LUNGS:  No crackles, wheezing.  ABDOMEN:  Soft.  No distention.  Not 
tender.  Positive bowel sounds.  :  He wet himself.  No suprapubic or CVA 
tenderness.  MUSCULOSKELETAL:  He moving all 4 extremities.  NEURO:  There are 
no focal deficits.  CN 2 through 12 intact.  Positive asterixis.  PSYCH:  He is 
alert to [place], not to hospital or date.  He knows that Christmas is the next 
holiday.



LABORATORY DATA:  Sodium 143, potassium 4.3, chloride 109, carbon dioxide 24, 
BUN 40, creatinine 0.9, glucose 107, total bilirubin 1.7, conjugated 0.7, AST 85
, ALT 54, albumin 3.4, total protein 7.4, lipase 308.  Procalcitonin is 
pending.  Ammonia is 30.  Lactate is 1.8.  Fecal occult blood negative.  INR is 
1.3.  PTT is 16.  WBC is 5.9.  Hemoglobin 11, hematocrit 31, platelets 152 
which is at baseline. 



Head CT, personally reviewed, cerebral atrophy.  No acute hemorrhage or mass 
effect.  There is moderate right ethmoid sinusitis. 



EKG personally reviewed by me.  Right bundle branch block that is old. 



Chest x-ray, personally reviewed by me, appears to be bronchitis.



ASSESSMENT AND PLAN:  

1.  Acute encephalopathy:  Differential includes infection, opioids, or 
medication noncompliance since he has not been taking lactulose.  Chest x-ray 
showing possible bronchitis.  He is afebrile without leukocytosis.  He was 
dosed azithromycin and ceftriaxone in the emergency room. Check procalcitonin 
before continuing  antibiotics.  UA, GI panel, and respiratory panel are 
pending. No abdominal pain upon my exam.Resume lactulose.  His ammonia level is 
normal.  Electrolytes within normal.  Check US to see if ascites to tap.

2.  Compensated cirrhosis:  Secondary to alcohol and hepatitis C.  We will 
resume home diuretics, as creatinine is stable.  There is no evidence of volume 
overload.

3.  Nausea, vomiting, diarrhea:  Unclear how long this has actually been going 
on.  A GI panel is pending.

4.  Hyperbilirubinemia.  Total bilirubin is 1.7.  This is actually down from 
June.

5.  History of Clostridium difficile:  pending

6.  History of right lower extremity fasciitis: s/p surgeries by Dr. Pinzon in 
July 2013.

7.  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

8.  Macrocytic anemia:  Secondary to alcohol.  

9.  Diet:  Low sodium. 

10.  Deep vein thrombosis prophylaxis:  Lovenox.



DISPOSITION:  The patient warrants inpatient admission given acute 
encephalopathy, requiring further lab testing, titration of lactulose, and PT/
OT.





Job #:  [id number]

MTDD

## 2017-12-05 LAB
ALBUMIN SERPL-MCNC: 2.9 G/DL (ref 3.5–5)
ALP SERPL-CCNC: 51 IU/L (ref 38–126)
ALT SERPL-CCNC: 52 IU/L (ref 21–72)
ANION GAP SERPL CALC-SCNC: 12 MEQ/L (ref 8–16)
AST SERPL-CCNC: 70 IU/L (ref 17–59)
BILIRUB SERPL-MCNC: 1.7 MG/DL (ref 0.1–1.4)
CALCIUM SERPL-MCNC: 9.5 MG/DL (ref 8.5–10.4)
CHLORIDE SERPL-SCNC: 112 MEQ/L (ref 97–110)
CO2 SERPL-SCNC: 21 MEQ/L (ref 22–31)
CREAT SERPL-MCNC: 0.8 MG/DL (ref 0.7–1.3)
ERYTHROCYTE [DISTWIDTH] IN BLOOD BY AUTOMATED COUNT: 15.4 % (ref 11.5–15.2)
GFR SERPL CREATININE-BSD FRML MDRD: > 60 ML/MIN/{1.73_M2}
GLUCOSE SERPL-MCNC: 100 MG/DL (ref 70–100)
HCT VFR BLD CALC: 29.5 % (ref 40–51)
HGB BLD-MCNC: 10.6 G/DL (ref 13.7–17.5)
LIPEMIA HEMOLYSIS FLAG: 90 (ref 0–99)
MCH RBC BLDCO QN: 33 PG (ref 27.9–34.1)
MCHC RBC AUTO-ENTMCNC: 35.9 G/DL (ref 32.4–36.7)
MCV RBC AUTO: 91.9 FL (ref 81.5–99.8)
PLATELET # BLD: 134 10^3/UL (ref 150–400)
PLATELET CLUMPS FLAG: 10 (ref 0–99)
POTASSIUM SERPL-SCNC: 4.2 MEQ/L (ref 3.5–5.2)
PROT SERPL-MCNC: 6.4 G/DL (ref 6.3–8.2)
RBC # BLD AUTO: 3.21 10^6/UL (ref 4.4–6.38)
SODIUM SERPL-SCNC: 145 MEQ/L (ref 134–144)

## 2017-12-05 RX ADMIN — SERTRALINE HYDROCHLORIDE SCH MG: 50 TABLET, FILM COATED ORAL at 08:58

## 2017-12-05 RX ADMIN — VANCOMYCIN HYDROCHLORIDE SCH MLS: 1 INJECTION, POWDER, LYOPHILIZED, FOR SOLUTION INTRAVENOUS at 20:59

## 2017-12-05 RX ADMIN — VANCOMYCIN HYDROCHLORIDE SCH MLS: 1 INJECTION, POWDER, LYOPHILIZED, FOR SOLUTION INTRAVENOUS at 10:14

## 2017-12-05 RX ADMIN — LACTULOSE SCH GM: 20 SOLUTION ORAL at 18:11

## 2017-12-05 RX ADMIN — ENOXAPARIN SODIUM SCH MG: 100 INJECTION SUBCUTANEOUS at 08:58

## 2017-12-05 RX ADMIN — GABAPENTIN SCH MG: 300 CAPSULE ORAL at 23:15

## 2017-12-05 RX ADMIN — LACTULOSE SCH GM: 20 SOLUTION ORAL at 08:59

## 2017-12-05 RX ADMIN — LACTULOSE SCH GM: 20 SOLUTION ORAL at 20:59

## 2017-12-05 RX ADMIN — PANTOPRAZOLE SODIUM SCH MG: 40 TABLET, DELAYED RELEASE ORAL at 08:58

## 2017-12-05 RX ADMIN — GABAPENTIN SCH MG: 300 CAPSULE ORAL at 20:59

## 2017-12-05 RX ADMIN — VANCOMYCIN HYDROCHLORIDE SCH MG: KIT at 10:14

## 2017-12-05 RX ADMIN — OXYCODONE HYDROCHLORIDE PRN MG: 15 TABLET ORAL at 12:00

## 2017-12-05 RX ADMIN — PROPRANOLOL HYDROCHLORIDE SCH: 20 TABLET ORAL at 09:06

## 2017-12-05 RX ADMIN — VANCOMYCIN HYDROCHLORIDE SCH MG: KIT at 13:03

## 2017-12-05 RX ADMIN — OXYCODONE HYDROCHLORIDE PRN MG: 15 TABLET ORAL at 18:11

## 2017-12-05 RX ADMIN — VANCOMYCIN HYDROCHLORIDE SCH MG: KIT at 21:00

## 2017-12-05 RX ADMIN — VANCOMYCIN HYDROCHLORIDE SCH MG: KIT at 18:11

## 2017-12-05 RX ADMIN — PROPRANOLOL HYDROCHLORIDE SCH: 20 TABLET ORAL at 19:50

## 2017-12-05 NOTE — HOSPPROG
Hospitalist Progress Note


Assessment/Plan: 


Assessment:  57-year-old male presents with acute encephalopathy





Plan:





1. Acute encephalopathy.  Evidenced by global brain dysfunction characterized 

as confusion, disorientation, found down, all of which are acute changes from 

his baseline, most likely secondary to the toxic effects of infection


-treat infection


-mental status has begun to improve with IV fluids, still remains slightly 

below his baseline with poor concentration


-get cog eval tell determine whether the patient will require skilled nursing 

facility after discharge to complete activities of daily living


-head CT with out any intracranial pathology





2. Suspected Staph aureus bacteremia. Acute, new problem, further w/u 

indicated. 3/4 blood cultures are currently positive, awaiting speciation, 

unclear source


-chest x-ray does not appear to have overt pneumonia, personally interpreted


-the only clearly identifiable wound unable to locate is on his left upper 

extremity but with very minimal surrounding erythema, central ulceration with 

mild pustulant drainage, does not appear to have underlying abscess


-initiate 1.5 g of vancomycin q.12 hours IV


-will repeat blood cultures tomorrow at noon after the patient has received a 

full 24 hr of IV antibiotic therapy


-discussed with Dr. Simran Amato, will get Infectious Disease consultation for 

assistance in identifying source, duration of therapy, ongoing outpatient care





3. History of C diff colitis.  Per chart review, there were reports of diarrhea 

and vomiting prior to presentation, the patient's ability to provide history 

was impaired


-the patient has chronically been on lactulose so his bowel movement frequency 

may be entirely attributable to this agent


-patient is currently had 2 formed bowel movements here


-his C diff PCR is positive


-will place him on vancomycin 125 4 times daily, but suspect that we may reduce 

this to purely suppressive dosing given that he may not have active C diff and 

we may need to only continue this agent while he is on IV vancomycin





4. Chronic cirrhosis.  Secondary to alcohol intake and hepatitis C virus, 

patient reports that he has been sober for the last 6 months


-patient reports that he uses a pillbox, is on propranolol, spironolactone, 

Lasix, will hold his diuretic agents while we are currently identifying and 

treating infection


-continue propranolol


-continue lactulose


-no evidence of hepatic encephalopathy with normal ammonia level, no asterixis


-continue monitor hyperbilirubinemia, liver panel





5. Chronic pain with continuous opiate dependency.  Patient reports that he 

chronically takes oxycodone and tramadol for abdominal pain, which she 

associates with his cirrhosis


-will reduce his dosage of oxycodone immediate release to 5 mg q.6 hours, given 

impaired hepatic clearance, and impaired mental status as outlined above


-will hold his tramadol, delay dosing his gabapentin until tonight


-can up titrate his oxycodone tomorrow if his mental status continues to improve





6. Anemia of chronic inflammatory disease.  Fecal occult blood test negative, 

secondary to liver disease, continue monitor CBC





Diet.  Regular as tolerates, SLP eval





Prophylaxis.  High risk patient, Lovenox 40





Code.  Full





Disposition.  Anticipated discharge is uncertain this time, anticipated length 

stay greater than 48 hr, for further workup and treatment of condition outlined 

above.





Subjective: Patient continues to experience abdominal pain


Objective: 


 Vital Signs











Temp Pulse Resp BP Pulse Ox


 


 36.6 C   73   20   85/50 L  99 


 


 12/05/17 10:49  12/05/17 10:49  12/05/17 10:49  12/05/17 10:49  12/05/17 10:49








 Microbiology











 12/04/17 19:14 Gastrointestinal Tract Panel (PCR) - Final





 Stool    Clostridium Difficile Detected


 


 12/04/17 22:25 Respiratory Panel (PCR) - Final





 Nasal, Sinus - Swab    No Organism Detected








 Laboratory Results





 12/05/17 04:54 





 12/05/17 04:54 





 











 12/04/17 12/05/17 12/06/17





 05:59 05:59 05:59


 


Intake Total  2300 


 


Output Total  200 


 


Balance  2100 








 











PT  16.4 SEC (12.0-15.0)  H  12/04/17  12:20    


 


INR  1.30  (0.83-1.16)  H  12/04/17  12:20    














- Physical Exam


Constitutional: no apparent distress, chronically ill appearing, uncomfortable, 

No not in pain (Moderate)


Eyes: other (Anisocoria right greater than left)


Cardiovascular: regular rate and rhythym, no murmur, rub, or gallop, No edema


Respiratory: no respiratory distress, no rales or rhonchi, clear to auscultation


Gastrointestinal: normoactive bowel sounds, tenderness (Mild), No guarding, No 

distension


Skin: other (No erythema around fluctuant area adjacent patient's knee, minimal 

erythema along his left forearm with central pustulant, ulceration, no 

tenderness, no induration)


Neurologic: AAOx3, sensation intact bilaterally, other (Concentration 3/7), No 

asterixes, No facial droop


Psychiatric: not anxious, flat affect, poor memory, No agitated





ICD10 Worksheet


Patient Problems: 


 Problems











Problem Status Onset


 


Dyspnea Acute  


 


Liver cirrhosis Acute  


 


Hepatorenal failure Acute  


 


Hyperammonemia Acute  


 


Palliative care encounter Acute  


 


Altered mental status Acute  


 


C. difficile diarrhea Acute  ~05/12/17


 


Alcohol dependence Active  


 


Kidney disease Active  


 


Cellulitis Active  


 


Necrotizing fasciitis Active  


 


Acute renal failure Acute  


 


Squamous cell carcinoma Acute  


 


Alcoholism Chronic  


 


Gait instability Chronic  


 


Squamous cell cancer of skin of right shoulder Acute  


 


Hyperkalemia Acute  


 


Low back pain Chronic  


 


Hepatitis C Chronic  


 


Edema extremities Acute  


 


HTN (hypertension) Acute  


 


MRSA (methicillin resistant staph aureus) culture positive Acute  07/21/14


 


Pneumonia Acute  


 


Hyperbilirubinemia Acute  


 


Weakness Acute

## 2017-12-05 NOTE — GCON
[f rep st]



                                                                    CONSULTATION





INFECTIOUS DISEASE CONSULTATION.



DATE OF CONSULTATION:  12/05/2017



REFERRING PHYSICIAN:  Marino Maloney MD





REASON FOR CONSULTATION:  Bacteremia.



HISTORY OF PRESENT ILLNESS:  This is a 57-year-old,  male with a past medical history signif
icant for alcohol use for which he recently stopped, hepatitis C with cirrhosis, and history of right
 lower extremity necrotizing fasciitis a few years ago requiring multiple surgical debridements.  He 
comes in now with several weeks of diarrhea, nausea and vomiting, and some confusion intermittently. 
 His stools have improved since last night and today, where he has had some mostly semi formed stools
.  He tells me he had blood cultures done through the ER, and 3 out of 4 bottles are growing out gram
-positive cocci with MRSA as the preliminary ID.  He tells me that he has been having some back pain 
now acutely over the past at least 2 weeks involving the mid back and lower back.  He has also had so
me weakness in the lower extremities and multiple falls over the past 6-8 weeks.  He also states that
 he has incontinence of urine and has had trouble with some bowel incontinence as well recently.  He 
has not had any fevers, but he has been having chills over the past couple of weeks as well.  He rey
es coughing or bringing up any phlegm.  He has been staying at the shelter.  He has also been having 
some intermittent blurring of vision as well over the last couple of weeks as well.  Chest x-ray done
 shows some mild bronchitis-like changes.  He had a head CT done with some mild cerebral atrophy but 
no acute hemorrhage or mass effect.  Infectious Disease is now consulted for further evaluation and o
michelle.



REVIEW OF SYSTEMS:  No fevers or shaking chills.  HEAD:  No headaches.  EYES:  With some blurring of 
vision intermittently.  ENT:  No sore throat, difficulty swallowing, ear pain or ear drainage.  CARDI
OVASCULAR:  He had some intermittent chest pain.  No palpitations.  RESPIRATORY:  Denies any shortnes
s of breath, cough or sputum production.  ABDOMEN:  Some occasional nausea, vomiting and diarrhea ove
r the past several weeks, which is improved today.  Denies any abdominal pain as such today.  :  De
nies any burning with urination but is having urinary incontinence.  Previously, he has complained of
 bowel incontinence as well.  MUSCULOSKELETAL:  Denies any other joint pains or muscle aches at prese
nt.  SKIN:  Denies any other rashes.  He did have a fall about 6 weeks ago and had a gash on the righ
t lower extremity requiring stitches.  This has been healing up.  The rest of the 10-point review of 
systems essentially negative except for above.



PAST MEDICAL HISTORY:  Significant for right lower extremity necrotizing fasciitis status post multip
le surgical debridements, hepatitis C with cirrhosis, previous alcohol use, portal hypertension, depr
ession, history of C difficile, history of anemia.



PAST SURGICAL HISTORY:  Significant for right lower extremity necrotizing fasciitis status post multi
ple debridements.



SOCIAL HISTORY:  He is denying any smoking.  He has recently quit alcohol.  No illicit drug usage.  YARITZA hoffman lives with a roommate at the [facility].



FAMILY HISTORY:  Significant for Alzheimer's and depression.



ALLERGIES:  No known drug allergies.



MEDICATIONS:  As per MAR.



PHYSICAL EXAMINATION:  VITAL SIGNS:  Temperature current 36.6, pulse is 73, blood pressure 85/50, sat
urations are 99% on room air, respiratory rate is 20.  GENERAL:  He is sitting up in a chair.  No acu
te respiratory distress.  Awake and alert, mostly oriented.  HEENT:  Head is normocephalic, atraumati
c.  EYES:  Without conjunctival injection.  No petechiae noted.  OROPHARYNX:  Clear.  There is no pos
terior erythema or thrush.  CARDIOVASCULAR:  S1, S2.  Regular rate and rhythm.  On exam, possible sof
t systolic murmur present.  RESPIRATORY:  Mild coarse breath sounds bilaterally.  ABDOMEN:  Positive 
bowel sounds in all 4 quadrants.  Soft, nontender, nondistended.  EXTREMITIES:  No lower extremity ed
ema.  No effusions noted on the joints.  No pain on palpation of the joints.  He has a scar noted in 
the right upper leg, which has scabbing noted.  There is no surrounding cellulitis.  SKIN:  He has a 
scab noted on the left upper forearm as well without surrounding cellulitis as well.  There is no jose ramon
inage.  Surgical scar noted in the right lower extremity, which is pretty extensive, well-healed.



LABORATORY DATA:  White blood cell count 6.4, hemoglobin 10.6, platelets are 134.  Sodium 145, potass
ium 4.2, chloride is 112, bicarb is 21, BUN is 31, creatinine is 0.8.  AST 70, ALT 52, alkaline phosp
hatase 51, total bilirubin 1.7.  Troponin 0.012.  Ammonia 30.  Urinalysis with __________ RBCs, negat
lana nitrites, negative leukocyte esterases, no WBCs seen.  Stool occult blood negative.



MICROBIOLOGY:  Three out of four blood cultures are positive for gram-positive cocci, with MRSA as th
e preliminary ID.  He had a PCR panel done, which shows C difficile detected.  Respiratory viral pane
l is negative.



IMAGING:  Chest x-ray images reviewed which shows some mild bronchitis changes.  CT of the brain revi
ewed and as stated above.



ASSESSMENT:  

1.  Methicillin-resistant Staphylococcus aureus bacteremia.

2.  Back pain with lower extremity weakness/incontinence, rule out epidural abscess.  

3.  Diarrhea for weeks with positive C difficile PCR, unclear if true infection versus colonization.



PLAN:  Currently, the patient is placed on IV vancomycin which we will continue for now.  We will ord
er a trough prior to his 4th dose.  Further workup is warranted given his recent history of acute stacey
k pain over the past 2 weeks along with incontinence and lower extremity weakness and multiple falls 
recently.  I recommend MRI of the thoracic and lumbar spine STAT with and without contrast to further
 evaluate.  I would also recommend echocardiogram to further evaluate given bacteremia.  He will like
ly need an eye exam as well given some intermittent changes in vision.  Repeat blood cultures in 48 h
ours, which I have ordered.  We will add CRP to blood that is in the lab today.  As for the positive 
C difficile PCR, again unclear if true versus colonization, but given he has had intractable diarrhea
 for the last several weeks, we will continue him on treatment dosing for now.  Continue with contact
 isolation.  Care was coordinated with the nurse and the hospitalist team.  



I thank you, very much, for this opportunity to care for your patient in consultation.





Job #:  [id number]

## 2017-12-05 NOTE — ASMTCASEMG
Living Arrangements

 

What is your living           Answers:  Alone                                 

arrangement? Who do you                                                       

live with?                                                                    

Type Of Residence

 

What kind of residence do     Answers:  Apartment                             

you live in?                                                                  

Discharge Plan Comments

 

Coordination Status           

Comments                      

Notes:

Patient is a 56yo male who was admitted for acute encephalopathy evidenced by global brain 

dysfunction with confusion and disorientation. These are acute changes from patient's baseline and 

most likely secondary to the toxic effects of infection. Patient lives alone but has his own 

apartment. OT/PT/SPL have been ordered. Spoke with patient today who was worried about his care 

when he leaves the hospital. We discussed getting the therapy recommendations and then making plans 


from there. Patient was reassured we would help get services in place to support him. CM will 

follow.

 

Date Signed:  12/05/2017 04:44 PM

Electronically Signed By:Rizwana Wade LCSW

## 2017-12-05 NOTE — PDMN
Medical Necessity


Medical necessity: Pt meets IP criteria per MD; est los >2 mn for eval/tx of 

acute encephalopathy & suspected Staph aureus bacteremia; admit for further 

workup, IVF, ID consult, IV abx, blood cxs & therapies; hx C diff, chronic 

cirrhosis & hep C; per H&P & order 12/5/17

## 2017-12-06 LAB
% IMMATURE GRANULYOCYTES: 0.1 % (ref 0–1.1)
ABSOLUTE IMMATURE GRANULOCYTES: 0.01 10^3/UL (ref 0–0.1)
ABSOLUTE NRBC COUNT: 0 10^3/UL (ref 0–0.01)
ADD DIFF?: NO
ADD MORPH?: NO
ADD SCAN?: NO
ANION GAP SERPL CALC-SCNC: 10 MEQ/L (ref 8–16)
ATYPICAL LYMPHOCYTE FLAG: 0 (ref 0–99)
CALCIUM SERPL-MCNC: 9.2 MG/DL (ref 8.5–10.4)
CHLORIDE SERPL-SCNC: 111 MEQ/L (ref 97–110)
CO2 SERPL-SCNC: 20 MEQ/L (ref 22–31)
CREAT SERPL-MCNC: 0.7 MG/DL (ref 0.7–1.3)
ERYTHROCYTE [DISTWIDTH] IN BLOOD BY AUTOMATED COUNT: 15.4 % (ref 11.5–15.2)
FRAGMENT RBC FLAG: 0 (ref 0–99)
GFR SERPL CREATININE-BSD FRML MDRD: > 60 ML/MIN/{1.73_M2}
GLUCOSE SERPL-MCNC: 93 MG/DL (ref 70–100)
HCT VFR BLD CALC: 29.2 % (ref 40–51)
HGB BLD-MCNC: 10.2 G/DL (ref 13.7–17.5)
LEFT SHIFT FLG: 0 (ref 0–99)
LIPEMIA HEMOLYSIS FLAG: 90 (ref 0–99)
MCH RBC BLDCO QN: 32.4 PG (ref 27.9–34.1)
MCHC RBC AUTO-ENTMCNC: 34.9 G/DL (ref 32.4–36.7)
MCV RBC AUTO: 92.7 FL (ref 81.5–99.8)
NRBC-AUTO%: 0 % (ref 0–0.2)
PLATELET # BLD: 142 10^3/UL (ref 150–400)
PLATELET CLUMPS FLAG: 0 (ref 0–99)
PMV BLD AUTO: 11.9 FL (ref 8.7–11.7)
POTASSIUM SERPL-SCNC: 4.4 MEQ/L (ref 3.5–5.2)
RBC # BLD AUTO: 3.15 10^6/UL (ref 4.4–6.38)
SODIUM SERPL-SCNC: 141 MEQ/L (ref 134–144)

## 2017-12-06 RX ADMIN — GABAPENTIN SCH MG: 300 CAPSULE ORAL at 17:14

## 2017-12-06 RX ADMIN — VANCOMYCIN HYDROCHLORIDE SCH MG: KIT at 05:21

## 2017-12-06 RX ADMIN — GABAPENTIN SCH MG: 300 CAPSULE ORAL at 10:46

## 2017-12-06 RX ADMIN — VANCOMYCIN HYDROCHLORIDE SCH MLS: 1 INJECTION, POWDER, LYOPHILIZED, FOR SOLUTION INTRAVENOUS at 10:54

## 2017-12-06 RX ADMIN — OXYCODONE HYDROCHLORIDE PRN MG: 15 TABLET ORAL at 11:04

## 2017-12-06 RX ADMIN — SERTRALINE HYDROCHLORIDE SCH MG: 50 TABLET, FILM COATED ORAL at 10:48

## 2017-12-06 RX ADMIN — OXYCODONE HYDROCHLORIDE PRN MG: 15 TABLET ORAL at 05:21

## 2017-12-06 RX ADMIN — PANTOPRAZOLE SODIUM SCH MG: 40 TABLET, DELAYED RELEASE ORAL at 10:48

## 2017-12-06 RX ADMIN — VANCOMYCIN HYDROCHLORIDE SCH MG: KIT at 17:16

## 2017-12-06 RX ADMIN — VANCOMYCIN HYDROCHLORIDE SCH MG: KIT at 21:12

## 2017-12-06 RX ADMIN — OXYCODONE HYDROCHLORIDE PRN MG: 15 TABLET ORAL at 17:15

## 2017-12-06 RX ADMIN — LACTULOSE SCH GM: 20 SOLUTION ORAL at 21:11

## 2017-12-06 RX ADMIN — PROPRANOLOL HYDROCHLORIDE SCH MG: 20 TABLET ORAL at 21:09

## 2017-12-06 RX ADMIN — VANCOMYCIN HYDROCHLORIDE SCH MG: KIT at 12:15

## 2017-12-06 RX ADMIN — LACTULOSE SCH GM: 20 SOLUTION ORAL at 11:05

## 2017-12-06 RX ADMIN — ENOXAPARIN SODIUM SCH MG: 100 INJECTION SUBCUTANEOUS at 10:49

## 2017-12-06 RX ADMIN — GABAPENTIN SCH MG: 300 CAPSULE ORAL at 21:09

## 2017-12-06 RX ADMIN — OXYCODONE HYDROCHLORIDE PRN MG: 15 TABLET ORAL at 23:53

## 2017-12-06 RX ADMIN — LACTULOSE SCH GM: 20 SOLUTION ORAL at 17:16

## 2017-12-06 RX ADMIN — PROPRANOLOL HYDROCHLORIDE SCH MG: 20 TABLET ORAL at 10:48

## 2017-12-06 NOTE — WOCRNPDOC
WOCRN Advanced Assessment Note





- Skin Integrity Problem, Advanced Assess





  ** Buttock


Dressing Type: Open to Air


Site Measurement - Head-to-Toe Length X Width X Depth (cm): 1.5x0.3x0.1


Skin Integrity Problem Comment: Intertrigenous dermatitis. Treat with calazime. 

Wound care will sign off. Reported to BRIEN Loyd.





  ** Left Lower Proximal Arm Abrasion


Dressing Type: Gauze


Closure Description: Steri Strips


Wound Bed Constitution: Smooth Tissue


Site Measurement - Head-to-Toe Length X Width X Depth (cm): 1x1x0.1


Skin Integrity Problem Comment: Partial thickness skin tear. Please use skin 

tear policy.

## 2017-12-06 NOTE — ECHO
[email or web address]





[address] [address] 

Main: [phone] 



Fax: 



Transthoracic Echocardiogram 

Name:             RADHA FOLEY                             MR#:

[id number]

Study Date:       2017                               Study Time:

01:35 PM

YOB: 1960                               Age:

57 year(s)

Height:           182.9 cm (72 in.)                        Weight:

108.86 kg (240 lb.)

BSA:              2.3 m2                                   Gender:

Male

Examination:      Echo                                     Indication:

MRSA bacteremia, C-diff, Eval for Vegetation,



PNA, AMS 

Image Quality:                                             Contrast: 

Requested by:     Simran Amato                               BP:

85 mmHg/50 mmHg

Heart Rate:                                                Rhythm:

Normal sinus rhythm

Indication:       MRSA bacteremia, C-diff, Eval for Vegetation, PNA,

AMS



Procedure Staff 

Ultrasound Technician:   Deniz Avery 

Reading Physician:       Slim Monreal 

Requesting Provider: 



Conclusions:           Normal size left ventricle.  

Normal global systolic LV function.  

EF is 72 %.  

No regional wall motion abnormality.  

Normal size right ventricle.  

Normal RV function.  

The left atrium is normal in size.  

Mild mitral valve leaflet calcification is present.  

Trivial mitral valve regurgitation.  

Mild aortic cusp calcification is noted.  

There is no aortic valve regurgitation.  

The tricuspid valve is normal in appearance and function.  

There is no obvious vegetation on the aortic or mitral valve. If

clinically indicated consider SARTHAK.



Measurements: 

Chambers                     Valvular Assessment AV/MV

Valvular Assessment TV/PV



Normal                                    Normal

Normal

Name         Value     Range              Name         Value Range

Name           Value Range

Ao Mary (MM): 3.7 cm    (2.2 cm-3.7            AV Vmax:     2.01 m/s (1

m/s-1.7        TR Vmax:       2.85 mm/s ( - )



cm)                                   m/s)             TR PGmax:

32 mmHg ( - )

IVSd (2D):   1.0 cm (0.6 cm-1.1               AV maxP mmHg ( -

)          syst. PAP: 37 mmHg   ( - )



cm)                LVOT Vmax:   1.25 m/s (0.7 m/s-1.1      PV Vmax:

1.10 m/s (0.6 m/s-0.9

LVDd (2D):   5.2 cm    (4.2 cm-5.9                               m/s)

m/s)



cm)                MV E Vmax:   1.18 m/s ( - )         PV PGmax:

5  mmHg ( - )

LVDs (2D):   3.0 cm    (2.1 cm-4              MV A Vmax:   0.88 m/s (

- )



cm)                MV E/A:      1.34  ( - )  

LVPWd (2D):  1.0 cm    (0.6 cm-1 



cm)   

LVEF (2D):   72        (>=54 %)   



Patient: RADHA FOLEY                          MRN: [id number]

Study Date: 2017   Page 1 of 2

01:35 PM 









Continued Measurements: 

Chambers                      Valvular Assessment AV/MV

Valvular Assessment TV/PV



Name                       Value          Name

Value     Name                      Value

LADs Lon.9 cm               MV E' Septal:

0.06  m/s   CVP (est.):             5 mmHg

LA Area:                 21.7 cm2         MV E/E' Septal:

19.20

LA Volume:               77 ml            MV E/E' Lateral:

9.30

LA Volume Index:         33.5 ml/m2   



Findings:              Left Ventricle: 

Normal size left ventricle. Normal global systolic LV function. EF is

72 %. No regional wall motion

abnormality.  

Right Ventricle: 

Normal size right ventricle. Normal RV function.  

Left Atrium: 

The left atrium is normal in size.  

Right Atrium: 

The right atrium is normal in size.  

Mitral Valve: 

Mild mitral valve leaflet calcification is present. Trivial mitral

valve regurgitation.

Aortic Valve: 

The aortic valve is tri-leaflet. Mild aortic cusp calcification is

noted. There is no aortic valve

regurgitation. No aortic valve stenosis is present.  

Tricuspid Valve: 

The tricuspid valve is normal in appearance and function.  

Pulmonic Valve: 

The pulmonic valve is normal in appearance and function.  

Aorta: 

The aorta is normal.  

Pericardium: 

No pericardial effusion.  

Exam Comments: 

There is no obvious vegetation on the aortic or mitral valve. If

clinically indicated consider SARTHAK.







Electronically signed by Slim Monreal on 2017 at 11:46 AM 

(No Signature Object) 



Patient: RADHA FOLEY                          MRN: [id number]

Study Date: 2017   Page 2 of 2

01:35 PM 







[id number]

## 2017-12-06 NOTE — ASMTCMCOM
CM Note

 

CM Note                       

Notes:

PT and OT recommending SNF.  



Kendrick called Yony (Cell): [phone] at [facility] where Pt. lives to coordinate 

care.  Yony visited Pt. earlier today.  States that it would be better if Pt. went to SNF before 

returning to [facility] so that he could be stronger before resuming his homecare.  Yony mentioned 

that Pt. is completely sober at this time.   



Yulia from [facility] states Pt. is welcome back.  Should Pt. choose SNF, will likely need new 

ULTC 100. 



Ludar went to meet w/ Pt. today to find out his preferences.  Pt. today states he is interested in 

"going home to [his] apartment with hospice".  Kendrick coordinated w/ Palliative NP for a potential 

consult for tomorrow.  Consulted with MD who is up to date.  Kendrick/CM to follow for d/c POC.   

 

Date Signed:  12/06/2017 04:07 PM

Electronically Signed By:Stephanie Hinton LCSW

## 2017-12-06 NOTE — PCMIDPN
Assessment/Plan: 


Assessment:


Staph aureus bacteremia.  Probably MRSA by PBP.  Patient presented complaining 

of months of abdominal pain with diarrhea and nausea and vomiting.  Patient 

does have late stage liver disease.  Investigation spinal mass by MRI due to 

abnormal subluxation does not find any source of infection.  We will CT his 

abdomen and pelvis with contrast given his abdominal symptoms.  His positive C 

diff is not clearly indicative of colitis given his normal white blood cell 

count.  More likely this is colonization.  We will continue his empiric 

vancomycin monotherapy.








Plan:


1. Continue IV vancomycin.  Trough level tonight.


2. Continue p.o. vancomycin for now.


3. Abdominal pelvis CT with contrast.


4. Follow clinical course.


Subjective: 





Patient is resting in his hospital bed.  He appears weak.  Still complains of 

abdominal pain which is global.  This is been going on for many weeks.


Objective: 


Vancomycin IV # 1


Vancomycin p.o. # 1 Vital Signs











Temp Pulse Resp BP Pulse Ox


 


 37.3 C   85   18   112/71   93 


 


 12/06/17 10:29  12/06/17 10:29  12/06/17 10:29  12/06/17 10:29  12/06/17 10:29








 Laboratory Results





 12/06/17 04:45 





 12/06/17 04:45 





 











 12/05/17 12/06/17 12/07/17





 05:59 05:59 05:59


 


Intake Total 2300 1794 


 


Output Total 200  


 


Balance 2100 1794 








 











C-Reactive Protein  6.6 mg/L (<10.0)   12/05/17  04:54    














- Physical Exam


General Appearance: WD/WN, alert, apparent distress (Mild), toxic (Mildly toxic)


Respiratory: lungs clear, normal breath sounds, No respiratory distress


Cardiac/Chest: regular rate, rhythm, No tachycardia


Abdomen: soft, tender (Globally no rebound), No rebound, No peritoneal signs


Skin: normal color, warm/dry, rash (Single lesion on his left arm possibly 

consistent with abscess.)


Neuro/Psych: alert, normal mood/affect, oriented x 3





ICD10 Worksheet


Patient Problems: 


 Problems











Problem Status Onset


 


Altered mental status Acute  


 


Pneumonia Acute  


 


Alcohol dependence Active  


 


Cellulitis Active  


 


Kidney disease Active  


 


Necrotizing fasciitis Active  


 


Acute renal failure Acute  


 


C. difficile diarrhea Acute  ~05/12/17


 


Dyspnea Acute  


 


Edema extremities Acute  


 


HTN (hypertension) Acute  


 


Hepatorenal failure Acute  


 


Hyperammonemia Acute  


 


Hyperbilirubinemia Acute  


 


Hyperkalemia Acute  


 


Liver cirrhosis Acute  


 


MRSA (methicillin resistant staph aureus) culture positive Acute  07/21/14


 


Palliative care encounter Acute  


 


Squamous cell cancer of skin of right shoulder Acute  


 


Squamous cell carcinoma Acute  


 


Weakness Acute  


 


Alcoholism Chronic  


 


Gait instability Chronic  


 


Hepatitis C Chronic  


 


Low back pain Chronic

## 2017-12-07 LAB
ALBUMIN SERPL-MCNC: 2.6 G/DL (ref 3.5–5)
ALP SERPL-CCNC: 56 IU/L (ref 38–126)
ALT SERPL-CCNC: 46 IU/L (ref 21–72)
ANION GAP SERPL CALC-SCNC: 10 MEQ/L (ref 8–16)
AST SERPL-CCNC: 64 IU/L (ref 17–59)
BILIRUB SERPL-MCNC: 1.1 MG/DL (ref 0.1–1.4)
CALCIUM SERPL-MCNC: 9 MG/DL (ref 8.5–10.4)
CHLORIDE SERPL-SCNC: 111 MEQ/L (ref 97–110)
CO2 SERPL-SCNC: 21 MEQ/L (ref 22–31)
CREAT SERPL-MCNC: 0.7 MG/DL (ref 0.7–1.3)
GFR SERPL CREATININE-BSD FRML MDRD: > 60 ML/MIN/{1.73_M2}
GLUCOSE SERPL-MCNC: 117 MG/DL (ref 70–100)
POTASSIUM SERPL-SCNC: 4.1 MEQ/L (ref 3.5–5.2)
PROT SERPL-MCNC: 5.9 G/DL (ref 6.3–8.2)
SODIUM SERPL-SCNC: 142 MEQ/L (ref 134–144)

## 2017-12-07 RX ADMIN — VANCOMYCIN HYDROCHLORIDE SCH MLS: 1 INJECTION, POWDER, LYOPHILIZED, FOR SOLUTION INTRAVENOUS at 11:26

## 2017-12-07 RX ADMIN — GABAPENTIN SCH MG: 300 CAPSULE ORAL at 20:34

## 2017-12-07 RX ADMIN — LACTULOSE SCH GM: 20 SOLUTION ORAL at 16:08

## 2017-12-07 RX ADMIN — LACTULOSE SCH: 20 SOLUTION ORAL at 21:55

## 2017-12-07 RX ADMIN — LACTULOSE SCH GM: 20 SOLUTION ORAL at 08:57

## 2017-12-07 RX ADMIN — PROPRANOLOL HYDROCHLORIDE SCH MG: 20 TABLET ORAL at 20:35

## 2017-12-07 RX ADMIN — GABAPENTIN SCH MG: 300 CAPSULE ORAL at 16:08

## 2017-12-07 RX ADMIN — ENOXAPARIN SODIUM SCH MG: 100 INJECTION SUBCUTANEOUS at 08:54

## 2017-12-07 RX ADMIN — VANCOMYCIN HYDROCHLORIDE SCH MLS: 1 INJECTION, POWDER, LYOPHILIZED, FOR SOLUTION INTRAVENOUS at 22:32

## 2017-12-07 RX ADMIN — SERTRALINE HYDROCHLORIDE SCH MG: 50 TABLET, FILM COATED ORAL at 08:57

## 2017-12-07 RX ADMIN — VANCOMYCIN HYDROCHLORIDE SCH MG: KIT at 20:35

## 2017-12-07 RX ADMIN — PROPRANOLOL HYDROCHLORIDE SCH MG: 20 TABLET ORAL at 09:15

## 2017-12-07 RX ADMIN — GABAPENTIN SCH MG: 300 CAPSULE ORAL at 08:55

## 2017-12-07 RX ADMIN — VANCOMYCIN HYDROCHLORIDE SCH MG: KIT at 05:10

## 2017-12-07 RX ADMIN — OXYCODONE HYDROCHLORIDE PRN MG: 15 TABLET ORAL at 16:08

## 2017-12-07 RX ADMIN — OXYCODONE HYDROCHLORIDE PRN MG: 15 TABLET ORAL at 22:32

## 2017-12-07 RX ADMIN — VANCOMYCIN HYDROCHLORIDE SCH MG: KIT at 16:07

## 2017-12-07 RX ADMIN — VANCOMYCIN HYDROCHLORIDE SCH MG: KIT at 11:26

## 2017-12-07 RX ADMIN — OXYCODONE HYDROCHLORIDE PRN MG: 15 TABLET ORAL at 06:21

## 2017-12-07 RX ADMIN — ACETAMINOPHEN PRN MG: 325 TABLET ORAL at 08:55

## 2017-12-07 RX ADMIN — PANTOPRAZOLE SODIUM SCH MG: 40 TABLET, DELAYED RELEASE ORAL at 08:56

## 2017-12-07 RX ADMIN — VANCOMYCIN HYDROCHLORIDE SCH: 1 INJECTION, POWDER, LYOPHILIZED, FOR SOLUTION INTRAVENOUS at 00:07

## 2017-12-07 RX ADMIN — ACETAMINOPHEN PRN MG: 325 TABLET ORAL at 20:35

## 2017-12-07 NOTE — PCMIDPN
Assessment/Plan: 





# MRSA bacteremia, portal likely related to skin breaks, and predisposition of 

underlying cirrhosis.  TTE showed no convincing evidence of veg, some Ca++ on 

MV.  MRI T/L spine negative for OM/discitis.  One of initial blood cx 12/4 

polymicrobial, no clear to me significance, suspect contamination although all 

isolates would be covered with Vanco


--Vancomycin trough was 20 and dose of vancomycin 1.5 g was held.  Vancomycin 

was resumed today 1.25 g IV Q 12 at 1100


--continue IV vancomycin, recheck trough Saturday AM. Cr 0.7 today, continue 

daily BMPs for now.


--await results of blood cx from 12/5 and 12/6 before drawing additional 

cultures


--contact precautions





# Cdiff colitis with possible cecal thickening on CT scan.  Past positive test 5 /2017


--contact precautions


--vancomycin 125 mg p.o. four times daily





# Cirrhosis with antibody positive for HCV.  No additional info in labs





Medications


Vancomycin 1.25 g IV Q 12


Vancomycin 125 mg p.o. QID





Microbiology


12/5/2017 and 12/6/2017 blood cx - 2 sets each day pending


12/04/17 22:25   Resp Panel PCR neg


12/04/17 19:14   Stool   Gastrointestinal Tract Panel (PCR) -  Clostridium 

Difficile Detected


12/04/17 15:05   Blood   Cx MRSA, Vancomycin VICENTE = 1


12/04/17 15:00   Blood   Blood Culture - Preliminary


                              Staphylococcus Aureus


                              Staphylococcus Saprophyticus


                              Alpha Hemolytic Streptococcus


                              Staphylococcus Sp Coag Neg#2





Care coordinated with DR. Maloney





Subjective: 





patient c/o lumbar pain. Generally endorses similar to typical LBP


Objective: 


 Vital Signs











Temp Pulse Resp BP Pulse Ox


 


 37.0 C   78   18   107/65   98 


 


 12/07/17 08:00  12/07/17 08:00  12/07/17 08:00  12/07/17 08:00  12/07/17 08:00








 Laboratory Results





 12/06/17 04:45 





 12/07/17 04:50 





 











 12/06/17 12/07/17 12/08/17





 05:59 05:59 05:59


 


Intake Total 1794 3018 


 


Balance 1794 3018 








 











C-Reactive Protein  6.6 mg/L (<10.0)   12/05/17  04:54    














- Physical Exam


General Appearance: alert, no apparent distress


EENT: poor dentition


Respiratory: lungs clear, No accessory muscle use


Neck: supple


Cardiac/Chest: regular rate, rhythm, No systolic murmur


Extremities: No pedal edema


Abdomen: non-tender, soft, No ascites


Skin: pallor, No jaundice, No rash


Neuro/Psych: alert, normal mood/affect, oriented x 3





- Time Spent With Patient


Time Spent with Patient: greater than 35 minutes


Time Spent with Patient: Greater than 35 minutes spent on this patients care, 

greater than 50% of time spent counseling, educating, and coordinating care 

regarding the above mentioned plan.





ICD10 Worksheet


Patient Problems: 


 Problems











Problem Status Onset


 


Altered mental status Acute  


 


Pneumonia Acute  


 


Alcohol dependence Active  


 


Cellulitis Active  


 


Kidney disease Active  


 


Necrotizing fasciitis Active  


 


Acute renal failure Acute  


 


C. difficile diarrhea Acute  ~05/12/17


 


Dyspnea Acute  


 


Edema extremities Acute  


 


HTN (hypertension) Acute  


 


Hepatorenal failure Acute  


 


Hyperammonemia Acute  


 


Hyperbilirubinemia Acute  


 


Hyperkalemia Acute  


 


Liver cirrhosis Acute  


 


MRSA (methicillin resistant staph aureus) culture positive Acute  07/21/14


 


Palliative care encounter Acute  


 


Squamous cell cancer of skin of right shoulder Acute  


 


Squamous cell carcinoma Acute  


 


Weakness Acute  


 


Alcoholism Chronic  


 


Gait instability Chronic  


 


Hepatitis C Chronic  


 


Low back pain Chronic

## 2017-12-07 NOTE — GCON
[f rep st]



                                                                    CONSULTATION





INFECTIOUS DISEASE CONSULTATION



DICTATION CANCELLED





Job #:  [id number]

## 2017-12-08 LAB
ANION GAP SERPL CALC-SCNC: 9 MEQ/L (ref 8–16)
CALCIUM SERPL-MCNC: 9 MG/DL (ref 8.5–10.4)
CHLORIDE SERPL-SCNC: 110 MEQ/L (ref 97–110)
CO2 SERPL-SCNC: 21 MEQ/L (ref 22–31)
CREAT SERPL-MCNC: 0.7 MG/DL (ref 0.7–1.3)
GFR SERPL CREATININE-BSD FRML MDRD: > 60 ML/MIN/{1.73_M2}
GLUCOSE SERPL-MCNC: 90 MG/DL (ref 70–100)
POTASSIUM SERPL-SCNC: 4.4 MEQ/L (ref 3.5–5.2)
SODIUM SERPL-SCNC: 140 MEQ/L (ref 134–144)

## 2017-12-08 PROCEDURE — 02HV33Z INSERTION OF INFUSION DEVICE INTO SUPERIOR VENA CAVA, PERCUTANEOUS APPROACH: ICD-10-PCS | Performed by: RADIOLOGY

## 2017-12-08 RX ADMIN — LACTULOSE SCH GM: 20 SOLUTION ORAL at 17:06

## 2017-12-08 RX ADMIN — VANCOMYCIN HYDROCHLORIDE SCH MG: KIT at 11:41

## 2017-12-08 RX ADMIN — VANCOMYCIN HYDROCHLORIDE SCH MG: KIT at 17:06

## 2017-12-08 RX ADMIN — ACETAMINOPHEN PRN MG: 325 TABLET ORAL at 11:42

## 2017-12-08 RX ADMIN — PROPRANOLOL HYDROCHLORIDE SCH MG: 20 TABLET ORAL at 20:53

## 2017-12-08 RX ADMIN — VANCOMYCIN HYDROCHLORIDE SCH MLS: 1 INJECTION, POWDER, LYOPHILIZED, FOR SOLUTION INTRAVENOUS at 11:42

## 2017-12-08 RX ADMIN — ENOXAPARIN SODIUM SCH MG: 100 INJECTION SUBCUTANEOUS at 08:38

## 2017-12-08 RX ADMIN — PANTOPRAZOLE SODIUM SCH MG: 40 TABLET, DELAYED RELEASE ORAL at 08:40

## 2017-12-08 RX ADMIN — FUROSEMIDE SCH MG: 40 TABLET ORAL at 20:52

## 2017-12-08 RX ADMIN — VANCOMYCIN HYDROCHLORIDE SCH MLS: 1 INJECTION, POWDER, LYOPHILIZED, FOR SOLUTION INTRAVENOUS at 23:26

## 2017-12-08 RX ADMIN — LACTULOSE SCH GM: 20 SOLUTION ORAL at 08:38

## 2017-12-08 RX ADMIN — OXYCODONE HYDROCHLORIDE PRN MG: 15 TABLET ORAL at 20:53

## 2017-12-08 RX ADMIN — GABAPENTIN SCH MG: 300 CAPSULE ORAL at 17:06

## 2017-12-08 RX ADMIN — PROPRANOLOL HYDROCHLORIDE SCH MG: 20 TABLET ORAL at 08:42

## 2017-12-08 RX ADMIN — GABAPENTIN SCH MG: 300 CAPSULE ORAL at 08:43

## 2017-12-08 RX ADMIN — LACTULOSE SCH: 20 SOLUTION ORAL at 21:00

## 2017-12-08 RX ADMIN — OXYCODONE HYDROCHLORIDE PRN MG: 15 TABLET ORAL at 14:37

## 2017-12-08 RX ADMIN — SPIRONOLACTONE SCH MG: 100 TABLET, FILM COATED ORAL at 20:53

## 2017-12-08 RX ADMIN — VANCOMYCIN HYDROCHLORIDE SCH MG: KIT at 20:54

## 2017-12-08 RX ADMIN — GABAPENTIN SCH MG: 300 CAPSULE ORAL at 20:53

## 2017-12-08 RX ADMIN — VANCOMYCIN HYDROCHLORIDE SCH MG: KIT at 05:47

## 2017-12-08 RX ADMIN — OXYCODONE HYDROCHLORIDE PRN MG: 15 TABLET ORAL at 08:41

## 2017-12-08 RX ADMIN — SERTRALINE HYDROCHLORIDE SCH MG: 50 TABLET, FILM COATED ORAL at 08:42

## 2017-12-08 NOTE — HOSPPROG
Hospitalist Progress Note


Assessment/Plan: 


Assessment:  57-year-old male presents with acute encephalopathy in setting of 

MRSA bacteremia





Plan:





1. Acute encephalopathy. Validated in prior note, 2/2 infxn


-treat infection


-mental status at baseline


-likely will require SNF for asst w/ ADLs/IV abx s/p discharge





2. MRSA bacteremia. POA, unclear source although LUE skin tear possible


-per ID, 4 weeks of IV Vanco from first neg Cx date (12/6)


-L-spine/T-spine MRI w/o abscess





3. C diff colitis. POA, given colitis on CT, cont PO Vanco q6 x 14 days, then 

bid while on IV Vanco





4. Chronic cirrhosis.  Secondary to alcohol intake and hepatitis C virus, 

patient reports that he has been sober for the last 6 months


-MELD 11, low risk of short-term mortality specifically from liver disease


-followed up w/ SW regarding goals of care, patient is enrolled in a Palliative 

service at home, and would recommend that on discharge, it is made very clear 

that his housing unit staff understand that that agency should be the 1st 

contact when acute situations arise


-restarted home aldactone/lasix w/ repeat BMP in AM


-cont home lactulose





5. Chronic pain with continuous opiate dependency.  Patient reports that he 

chronically takes oxycodone and tramadol for abdominal pain, which he 

associates with his cirrhosis


-PRN oxy IR, uptitrate to 5-7.5mg (home dose 7.5-10mg) and gauge whether he 

tolerates


-tramadol not a good Rx, lowers seizure threshold, at risk w/ his liver disease





6. Anemia of chronic inflammatory disease.  Fecal occult blood test negative, 

secondary to liver disease, continue monitor CBC





Diet.  Regular as tolerates, SLP eval





Prophylaxis.  High risk patient, Lovenox 40





Code. DNR





Disposition.  Anticipated discharge is uncertain this time, anticipated length 

stay greater than 48 hr, for further workup and treatment of condition outlined 

above.











Subjective: patient w/ ongoing abd pain, wants oxy IR uptitrated


Objective: 


 Vital Signs











Temp Pulse Resp BP Pulse Ox


 


 36.6 C   71   18   107/61   97 


 


 12/08/17 15:40  12/08/17 15:40  12/08/17 15:40  12/08/17 15:40  12/08/17 15:40








 Laboratory Results





 12/06/17 04:45 





 12/08/17 04:45 





 











 12/07/17 12/08/17 12/09/17





 05:59 05:59 05:59


 


Intake Total 3018 1864 420


 


Balance 3018 1864 420








 











PT  16.4 SEC (12.0-15.0)  H  12/04/17  12:20    


 


INR  1.30  (0.83-1.16)  H  12/04/17  12:20    














- Physical Exam


Constitutional: no apparent distress, chronically ill appearing, uncomfortable, 

No not in pain (mild)


Cardiovascular: regular rate and rhythym, no murmur, rub, or gallop, edema (

trace bilat LE)


Respiratory: no respiratory distress, no rales or rhonchi, clear to auscultation


Gastrointestinal: normoactive bowel sounds, tenderness (mild to mod depth 

palpation), distension (mild), No guarding


Neurologic: AAOx3, sensation intact bilaterally, No weakness


Psychiatric: interacting appropriately, not anxious, not encephalopathic, 

thought process linear





ICD10 Worksheet


Patient Problems: 


 Problems











Problem Status Onset


 


Altered mental status Acute  


 


Pneumonia Acute  


 


Alcohol dependence Active  


 


Cellulitis Active  


 


Kidney disease Active  


 


Necrotizing fasciitis Active  


 


Acute renal failure Acute  


 


C. difficile diarrhea Acute  ~05/12/17


 


Dyspnea Acute  


 


Edema extremities Acute  


 


HTN (hypertension) Acute  


 


Hepatorenal failure Acute  


 


Hyperammonemia Acute  


 


Hyperbilirubinemia Acute  


 


Hyperkalemia Acute  


 


Liver cirrhosis Acute  


 


MRSA (methicillin resistant staph aureus) culture positive Acute  07/21/14


 


Palliative care encounter Acute  


 


Squamous cell cancer of skin of right shoulder Acute  


 


Squamous cell carcinoma Acute  


 


Weakness Acute  


 


Alcoholism Chronic  


 


Gait instability Chronic  


 


Hepatitis C Chronic  


 


Low back pain Chronic

## 2017-12-08 NOTE — PDPCPN
Palliative Care Progress Note


Assessment/Plan: 


 Referring provider: Dr Maloney





Reason for consult:


Complex medical decision making


Symptom control





HPI:


Mauro Aguilar is a 58 yo with PMH ETOH, cirrhosis, and hep C admitted to the 

hospital for increased weakness and nausea/vomiting. Confused on admission but 

this has improved and almost back at baseline. while in the hospital found to 

have MRSA bacteremia on antibiotics. Ct abdomen with colitis also being treated 

for c diff. Liver function has improved and he has been sober for the past 6 

months. Palliative care consulted for complex medical decision making. 





Met with Mauro at the bedside this morning, he states he doesn't feel he is at 

the end of his life and is feeling better. He is interested in hospice care at 

the end of life because he wants to die at home. Right now he wants to continue 

with medical interventions in order to get better. He understands he will need 

long term antibiotics. He feels living back at [facility] is a good quality of 

life for him. He wishes the diarrhea would improve so he can be able to move 

around, go outside more. He confirmed DNR status and states he would want his 

sister Kierra Wells to be his MDPOA but does not know her phone number. 





Assessment:


Physical:


- Pain: abdominal pain


   - on scheduled gabapentin


   - tylenol or oxy PRN





- weakness


   - PT/OT


   


- diarrhea


   - 2/2 lactulose, management per hospitalist  





Emotional/psychological: Has a lot of support from friends at [facility]


hx of anxiety: on zoloft and scheduled ativan TID





Advanced Care Planning:


   Is patient decisional?: Yes


   Code Status: DNR/DNI


   MD POA: he states his sister Kierra Wells would be decision maker but he 

does not know her phone number





Plan: Would like to be back at [facility] but wants to continue antibiotics. He 

is agreeable to SNF before going back to [facility]. He would be interested in 

hospice care at the end of his life if he was not able to improve and get 

better. 





12/08/17 15:10





Subjective: 


I wish the diarrhea would stop





Objective: 


Social History: Has 4 sisters local. Lives at [facility]. 





Medication list reviewed 





ROS: 


General: fatigue, weakness


ENT: negative


Resp: negative


GI: diarrhea


: negative


MS: general pain


Skin: negative


Neuro: negative


Psych: negative





Functional assessment:


   PPS: 50%


   Functional status: needs some assistance with ADLs. 








 Vital Signs











Temp Pulse Resp BP Pulse Ox


 


 36.6 C   74   18   101/71   98 


 


 12/08/17 11:38  12/08/17 11:38  12/08/17 11:38  12/08/17 11:38  12/08/17 11:38








 Laboratory Results





 12/06/17 04:45 





 12/08/17 04:45 





 











 12/07/17 12/08/17 12/09/17





 05:59 05:59 05:59


 


Intake Total 3018 1864 420


 


Balance 3018 1864 420








 











PT  16.4 SEC (12.0-15.0)  H  12/04/17  12:20    


 


INR  1.30  (0.83-1.16)  H  12/04/17  12:20    














Physical Exam





- Physical Exam


General Appearance: alert, no apparent distress


Respiratory: No respiratory distress, No accessory muscle use


Skin: normal color, warm/dry


Extremities: No pedal edema


Neuro/Psych: alert, oriented x 3





ICD10 Worksheet


Patient Problems: 


 Problems











Problem Status Onset


 


Altered mental status Acute  


 


Pneumonia Acute  


 


Alcohol dependence Active  


 


Cellulitis Active  


 


Kidney disease Active  


 


Necrotizing fasciitis Active  


 


Acute renal failure Acute  


 


C. difficile diarrhea Acute  ~05/12/17


 


Dyspnea Acute  


 


Edema extremities Acute  


 


HTN (hypertension) Acute  


 


Hepatorenal failure Acute  


 


Hyperammonemia Acute  


 


Hyperbilirubinemia Acute  


 


Hyperkalemia Acute  


 


Liver cirrhosis Acute  


 


MRSA (methicillin resistant staph aureus) culture positive Acute  07/21/14


 


Palliative care encounter Acute  


 


Squamous cell cancer of skin of right shoulder Acute  


 


Squamous cell carcinoma Acute  


 


Weakness Acute  


 


Alcoholism Chronic  


 


Gait instability Chronic  


 


Hepatitis C Chronic  


 


Low back pain Chronic

## 2017-12-08 NOTE — ASMTCMCOM
CM Note

 

CM Note                       

Notes:

Today SWer and Palliative Care NP went to visit Pt.  Pt. clearly stated he was not yet ready to die 


and wanted treatment.  Explained that treatment would involve increasing nursing care and IV 

antibiotics.  Asked Pt. if we could call in Yony,  from [facility] 

C: [phone].  Pt. agreed.  Yoyn came in today and SWer met with him and Pt in room.  Yony 

was able to help discuss the reality of it being too hard for Pt. to go straight home to his 

apt. after hospitalization.  Yony still working on getting  in to Pt's apt. due to 

Pt. having diarrhea all over the apt.  Pt. agreed to go to [facility] although he wished he 

didn't have to.  Sent referral to [facility] through [email or web address].  Pt. has been accepted when he 

is ready pending [facility] approval.



SWer completed ULTC 100 and faxed to [facility] today.  Faxed-attached completed ULTC document to 

[email or web address].



Also assisted Pt. in calling his sister, Kierra Wells [phone].  Pt. and sister 

reconnected.  Pt. would like Kierra to be his MDPOA.  Completed paperwork.  Original in chart and 

copy to medical records.  Gave Kierra contact information for [facility] 3E floor and case 

management.  Kierra's  Earnest Wells can be contacted at [phone].  Kierra and Earnest 

live in [place], [place].  



SWer coordinated w/ nursing to find medication reciept in front of hard chart.  Yony from [facility] said Pt. came in with a "bag" of home medications and a large medication manager 

box.  Yony states there are plans to get Pt. an electronic one.  



CM to follow for d/c to [facility] stay.   



 

 

Date Signed:  12/08/2017 04:18 PM

Electronically Signed By:Stephanie Hinton LCSW

## 2017-12-08 NOTE — PCMIDPN
Assessment/Plan: 


1.  MRSA bacteremia:


Repeat blood cultures so far no growth.  Will need 4 weeks of therapy after 1st 

negative blood culture, and PICC line inserted, likely this weekend.  I am here 

all weekend and can order this.  Patient will need to go to a skilled nursing 

facility for rehabilitation/convalescence.  TTE negative, MRIs of his back are 

also negative for infectious focus.  Repeat vancomycin trough tomorrow morning; 

this has been ordered by Dr. Middleton.  No new recommendations.


2.  C difficile colitis:


Patient continues to have diarrhea, but he is also receiving large amounts of 

lactulose.  Continue oral vancomycin four times daily times 14 days, then will 

back down to twice daily while he is on intravenous vancomycin, as intravenous 

vancomycin alone has been shown to alter the GI microbiota enough to be a 

concern.


3.  Chronic hepatitis C with cirrhosis:


This will need to be addressed as an outpatient.  Will look through his old 

chart to ensure he has been vaccinated for hepatitis A given outbreak of this 

disease across the [facility] and his risk of fulminant disease with 

concomitant hepatitis-C.



































Subjective: 


His usual cheerful self.  Not confused.  Had a nice conversation with him about 

his home state of [place].  Continues to have diarrhea, but is receiving 

lactulose for history of encephalopathy.  No shaking chills.  No abdominal pain.





Objective: 


Vancomycin 1.25 g IV q.12 hours day 3


Vancomycin 125 four times daily day 3


Afebrile Vital Signs











Temp Pulse Resp BP Pulse Ox


 


 36.6 C   74   18   106/75   95 


 


 12/08/17 08:00  12/08/17 08:00  12/08/17 08:00  12/08/17 08:00  12/08/17 08:00








 Laboratory Results





 12/06/17 04:45 





 12/08/17 04:45 





 











 12/07/17 12/08/17 12/09/17





 05:59 05:59 05:59


 


Intake Total 3018 1864 


 


Balance 3018 1864 








 











C-Reactive Protein  6.6 mg/L (<10.0)   12/05/17  04:54    








Blood cultures x2 from December 7th and 6th are pending


December 4th blood cultures with MRSA


1 of the sets also has 2 types of coagulase-negative Staph and alpha hemolytic 

Streptococcus





- Physical Exam


General Appearance: alert, no apparent distress


EENT: pharynx normal, No thrush


Respiratory: lungs clear


Cardiac/Chest: regular rate, rhythm


Extremities: other (Peripheral IV right antecubital fossa looks fine)


Abdomen: non-tender, soft, No distended


Skin: No embolic lesions


Neuro/Psych: oriented x 3, other (No asterixis)





ICD10 Worksheet


Patient Problems: 


 Problems











Problem Status Onset


 


Altered mental status Acute  


 


Pneumonia Acute  


 


Alcohol dependence Active  


 


Cellulitis Active  


 


Kidney disease Active  


 


Necrotizing fasciitis Active  


 


Acute renal failure Acute  


 


C. difficile diarrhea Acute  ~05/12/17


 


Dyspnea Acute  


 


Edema extremities Acute  


 


HTN (hypertension) Acute  


 


Hepatorenal failure Acute  


 


Hyperammonemia Acute  


 


Hyperbilirubinemia Acute  


 


Hyperkalemia Acute  


 


Liver cirrhosis Acute  


 


MRSA (methicillin resistant staph aureus) culture positive Acute  07/21/14


 


Palliative care encounter Acute  


 


Squamous cell cancer of skin of right shoulder Acute  


 


Squamous cell carcinoma Acute  


 


Weakness Acute  


 


Alcoholism Chronic  


 


Gait instability Chronic  


 


Hepatitis C Chronic  


 


Low back pain Chronic

## 2017-12-09 LAB
ANION GAP SERPL CALC-SCNC: 11 MEQ/L (ref 8–16)
ANION GAP SERPL CALC-SCNC: 9 MEQ/L (ref 8–16)
CALCIUM SERPL-MCNC: 8.9 MG/DL (ref 8.5–10.4)
CALCIUM SERPL-MCNC: 9 MG/DL (ref 8.5–10.4)
CHLORIDE SERPL-SCNC: 107 MEQ/L (ref 97–110)
CHLORIDE SERPL-SCNC: 109 MEQ/L (ref 97–110)
CO2 SERPL-SCNC: 21 MEQ/L (ref 22–31)
CO2 SERPL-SCNC: 22 MEQ/L (ref 22–31)
CREAT SERPL-MCNC: 0.7 MG/DL (ref 0.7–1.3)
CREAT SERPL-MCNC: 0.7 MG/DL (ref 0.7–1.3)
GFR SERPL CREATININE-BSD FRML MDRD: > 60 ML/MIN/{1.73_M2}
GFR SERPL CREATININE-BSD FRML MDRD: > 60 ML/MIN/{1.73_M2}
GLUCOSE SERPL-MCNC: 100 MG/DL (ref 70–100)
GLUCOSE SERPL-MCNC: 106 MG/DL (ref 70–100)
POTASSIUM SERPL-SCNC: 4.5 MEQ/L (ref 3.5–5.2)
POTASSIUM SERPL-SCNC: 4.5 MEQ/L (ref 3.5–5.2)
SODIUM SERPL-SCNC: 138 MEQ/L (ref 134–144)
SODIUM SERPL-SCNC: 141 MEQ/L (ref 134–144)

## 2017-12-09 RX ADMIN — PANTOPRAZOLE SODIUM SCH MG: 40 TABLET, DELAYED RELEASE ORAL at 08:09

## 2017-12-09 RX ADMIN — ENOXAPARIN SODIUM SCH MG: 100 INJECTION SUBCUTANEOUS at 08:09

## 2017-12-09 RX ADMIN — SPIRONOLACTONE SCH MG: 100 TABLET, FILM COATED ORAL at 21:37

## 2017-12-09 RX ADMIN — OXYCODONE HYDROCHLORIDE PRN MG: 15 TABLET ORAL at 16:35

## 2017-12-09 RX ADMIN — SPIRONOLACTONE SCH MG: 100 TABLET, FILM COATED ORAL at 08:12

## 2017-12-09 RX ADMIN — VANCOMYCIN HYDROCHLORIDE SCH MG: KIT at 17:00

## 2017-12-09 RX ADMIN — LACTULOSE SCH: 20 SOLUTION ORAL at 21:38

## 2017-12-09 RX ADMIN — GABAPENTIN SCH MG: 300 CAPSULE ORAL at 21:37

## 2017-12-09 RX ADMIN — ACETAMINOPHEN PRN MG: 325 TABLET ORAL at 06:16

## 2017-12-09 RX ADMIN — OXYCODONE HYDROCHLORIDE PRN MG: 15 TABLET ORAL at 03:19

## 2017-12-09 RX ADMIN — LACTULOSE SCH GM: 20 SOLUTION ORAL at 16:59

## 2017-12-09 RX ADMIN — GABAPENTIN SCH MG: 300 CAPSULE ORAL at 08:09

## 2017-12-09 RX ADMIN — GABAPENTIN SCH MG: 300 CAPSULE ORAL at 16:58

## 2017-12-09 RX ADMIN — PROPRANOLOL HYDROCHLORIDE SCH: 20 TABLET ORAL at 12:07

## 2017-12-09 RX ADMIN — FUROSEMIDE SCH MG: 40 TABLET ORAL at 16:59

## 2017-12-09 RX ADMIN — VANCOMYCIN HYDROCHLORIDE SCH: 1 INJECTION, POWDER, LYOPHILIZED, FOR SOLUTION INTRAVENOUS at 12:08

## 2017-12-09 RX ADMIN — VANCOMYCIN HYDROCHLORIDE SCH MG: KIT at 21:38

## 2017-12-09 RX ADMIN — VANCOMYCIN HYDROCHLORIDE SCH MG: KIT at 11:59

## 2017-12-09 RX ADMIN — VANCOMYCIN HYDROCHLORIDE SCH MG: KIT at 06:02

## 2017-12-09 RX ADMIN — PROPRANOLOL HYDROCHLORIDE SCH MG: 20 TABLET ORAL at 21:37

## 2017-12-09 RX ADMIN — SERTRALINE HYDROCHLORIDE SCH MG: 50 TABLET, FILM COATED ORAL at 08:10

## 2017-12-09 RX ADMIN — OXYCODONE HYDROCHLORIDE PRN MG: 15 TABLET ORAL at 08:41

## 2017-12-09 RX ADMIN — FUROSEMIDE SCH MG: 40 TABLET ORAL at 08:12

## 2017-12-09 RX ADMIN — LACTULOSE SCH: 20 SOLUTION ORAL at 10:26

## 2017-12-09 RX ADMIN — FUROSEMIDE SCH MG: 40 TABLET ORAL at 21:37

## 2017-12-09 RX ADMIN — OXYCODONE HYDROCHLORIDE PRN MG: 15 TABLET ORAL at 12:28

## 2017-12-09 NOTE — HOSPPROG
Hospitalist Progress Note


Assessment/Plan: 





# acute encephalopathy d/t infection


   - resolved


# MRSA bacteremia - possible cutaneous source


   - cont vanc, stop date 1/3/18


# c. dif colitis - cont vanc PO


# cirrhosis d/t HCV, etOH


   - cont lactulose, lasix, aldactone, propranolol


# chronic pain with continuous narcotic dependency


# anemia of chronic inflammation


Subjective: no complaints today; eating


Objective: 


 Vital Signs











Temp Pulse Resp BP Pulse Ox


 


 36.4 C   74   12   99/70 L  96 


 


 12/09/17 12:04  12/09/17 12:04  12/09/17 07:58  12/09/17 12:04  12/09/17 12:04








 Laboratory Results





 12/06/17 04:45 





 12/09/17 10:20 





 











 12/08/17 12/09/17 12/10/17





 05:59 05:59 05:59


 


Intake Total 1864 1920 


 


Balance 1864 1920 








 











PT  16.4 SEC (12.0-15.0)  H  12/04/17  12:20    


 


INR  1.30  (0.83-1.16)  H  12/04/17  12:20    








chart reviewed


MRIs reviewed


TTE reviewed





- Physical Exam


Constitutional: no apparent distress, appears nourished


Cardiovascular: regular rate and rhythym, no murmur, rub, or gallop


Respiratory: no respiratory distress, no rales or rhonchi, clear to auscultation


Gastrointestinal: normoactive bowel sounds, other (soft, mild diffuse TTP), No 

guarding, No rebound





ICD10 Worksheet


Patient Problems: 


 Problems











Problem Status Onset


 


Dyspnea Acute  


 


Liver cirrhosis Acute  


 


Hepatorenal failure Acute  


 


Hyperammonemia Acute  


 


Palliative care encounter Acute  


 


Altered mental status Acute  


 


C. difficile diarrhea Acute  ~05/12/17


 


Alcohol dependence Active  


 


Kidney disease Active  


 


Cellulitis Active  


 


Necrotizing fasciitis Active  


 


Acute renal failure Acute  


 


Squamous cell carcinoma Acute  


 


Alcoholism Chronic  


 


Gait instability Chronic  


 


Squamous cell cancer of skin of right shoulder Acute  


 


Hyperkalemia Acute  


 


Low back pain Chronic  


 


Hepatitis C Chronic  


 


Edema extremities Acute  


 


HTN (hypertension) Acute  


 


MRSA (methicillin resistant staph aureus) culture positive Acute  07/21/14


 


Pneumonia Acute  


 


Hyperbilirubinemia Acute  


 


Weakness Acute

## 2017-12-09 NOTE — PCMIDPN
Assessment/Plan: 


1.  MRSA bacteremia:


Repeat blood cultures are sterile.  PICC line inserted yesterday.  Vancomycin 

levels are too high, and troughs appear to be accurate.  Will change dose to 

1.5 g daily and repeat trough.  Reinforced that he will need to go to a skilled 

nursing facility; he is a bit despondent about that.  Needs 4 weeks of therapy 

after 1st negative blood culture, stop date 1/3/2018.


2.  C difficile colitis:


Patient continues to have diarrhea, but he is also receiving large amounts of 

lactulose.  Continue oral vancomycin four times daily times 14 days, then will 

back down to twice daily while he is on intravenous vancomycin, as intravenous 

vancomycin alone has been shown to alter the GI microbiota enough to be a 

concern.


3.  Chronic hepatitis C with cirrhosis:


This will need to be addressed as an outpatient.  He is immune to hepatitis a.









































Subjective: 


Not very happy about the thought of going to a skilled nursing facility, 

otherwise no complaints other than does not like the diarrhea associated with 

lactulose.  No abdominal pain, nausea or vomiting.





Objective: 


Vancomycin 1.25 g IV q.12 hours day for


Oral vancomycin 125 mg Q 6 day for


Afebrile Vital Signs











Temp Pulse Resp BP Pulse Ox


 


 36.9 C   77   12   95/66 L  94 


 


 12/09/17 07:58  12/09/17 07:58  12/09/17 07:58  12/09/17 07:58  12/09/17 07:58








 Laboratory Results





 12/06/17 04:45 





 12/09/17 10:20 





 











 12/08/17 12/09/17 12/10/17





 05:59 05:59 05:59


 


Intake Total 1864 1920 


 


Balance 1864 1920 








 











C-Reactive Protein  6.6 mg/L (<10.0)   12/05/17  04:54    








Blood cultures from December 6th and 7th remain sterile





- Physical Exam


General Appearance: alert, no apparent distress


Abdomen: non-tender, soft, No distended





ICD10 Worksheet


Patient Problems: 


 Problems











Problem Status Onset


 


Altered mental status Acute  


 


Pneumonia Acute  


 


Alcohol dependence Active  


 


Cellulitis Active  


 


Kidney disease Active  


 


Necrotizing fasciitis Active  


 


Acute renal failure Acute  


 


C. difficile diarrhea Acute  ~05/12/17


 


Dyspnea Acute  


 


Edema extremities Acute  


 


HTN (hypertension) Acute  


 


Hepatorenal failure Acute  


 


Hyperammonemia Acute  


 


Hyperbilirubinemia Acute  


 


Hyperkalemia Acute  


 


Liver cirrhosis Acute  


 


MRSA (methicillin resistant staph aureus) culture positive Acute  07/21/14


 


Palliative care encounter Acute  


 


Squamous cell cancer of skin of right shoulder Acute  


 


Squamous cell carcinoma Acute  


 


Weakness Acute  


 


Alcoholism Chronic  


 


Gait instability Chronic  


 


Hepatitis C Chronic  


 


Low back pain Chronic

## 2017-12-10 LAB
% IMMATURE GRANULYOCYTES: 0.2 % (ref 0–1.1)
ABSOLUTE IMMATURE GRANULOCYTES: 0.01 10^3/UL (ref 0–0.1)
ABSOLUTE NRBC COUNT: 0 10^3/UL (ref 0–0.01)
ADD DIFF?: NO
ADD MORPH?: NO
ADD SCAN?: NO
ANION GAP SERPL CALC-SCNC: 6 MEQ/L (ref 8–16)
ATYPICAL LYMPHOCYTE FLAG: 0 (ref 0–99)
CALCIUM SERPL-MCNC: 8.9 MG/DL (ref 8.5–10.4)
CHLORIDE SERPL-SCNC: 108 MEQ/L (ref 97–110)
CO2 SERPL-SCNC: 24 MEQ/L (ref 22–31)
CREAT SERPL-MCNC: 0.8 MG/DL (ref 0.7–1.3)
ERYTHROCYTE [DISTWIDTH] IN BLOOD BY AUTOMATED COUNT: 15.5 % (ref 11.5–15.2)
FRAGMENT RBC FLAG: 0 (ref 0–99)
GFR SERPL CREATININE-BSD FRML MDRD: > 60 ML/MIN/{1.73_M2}
GLUCOSE SERPL-MCNC: 108 MG/DL (ref 70–100)
HCT VFR BLD CALC: 26.6 % (ref 40–51)
HGB BLD-MCNC: 9.2 G/DL (ref 13.7–17.5)
LEFT SHIFT FLG: 0 (ref 0–99)
LIPEMIA HEMOLYSIS FLAG: 90 (ref 0–99)
MCH RBC BLDCO QN: 33.2 PG (ref 27.9–34.1)
MCHC RBC AUTO-ENTMCNC: 34.6 G/DL (ref 32.4–36.7)
MCV RBC AUTO: 96 FL (ref 81.5–99.8)
NRBC-AUTO%: 0 % (ref 0–0.2)
PLATELET # BLD: 128 10^3/UL (ref 150–400)
PLATELET CLUMPS FLAG: 0 (ref 0–99)
PMV BLD AUTO: 11 FL (ref 8.7–11.7)
POTASSIUM SERPL-SCNC: 4 MEQ/L (ref 3.5–5.2)
RBC # BLD AUTO: 2.77 10^6/UL (ref 4.4–6.38)
SODIUM SERPL-SCNC: 138 MEQ/L (ref 134–144)

## 2017-12-10 RX ADMIN — PROPRANOLOL HYDROCHLORIDE SCH MG: 20 TABLET ORAL at 21:22

## 2017-12-10 RX ADMIN — LACTULOSE SCH GM: 20 SOLUTION ORAL at 07:36

## 2017-12-10 RX ADMIN — ACETAMINOPHEN PRN MG: 325 TABLET ORAL at 21:21

## 2017-12-10 RX ADMIN — VANCOMYCIN HYDROCHLORIDE SCH MG: KIT at 05:15

## 2017-12-10 RX ADMIN — SPIRONOLACTONE SCH MG: 100 TABLET, FILM COATED ORAL at 07:34

## 2017-12-10 RX ADMIN — FUROSEMIDE SCH MG: 40 TABLET ORAL at 15:55

## 2017-12-10 RX ADMIN — VANCOMYCIN HYDROCHLORIDE SCH MG: KIT at 21:26

## 2017-12-10 RX ADMIN — GABAPENTIN SCH MG: 300 CAPSULE ORAL at 15:55

## 2017-12-10 RX ADMIN — PANTOPRAZOLE SODIUM SCH MG: 40 TABLET, DELAYED RELEASE ORAL at 07:34

## 2017-12-10 RX ADMIN — FUROSEMIDE SCH MG: 40 TABLET ORAL at 07:35

## 2017-12-10 RX ADMIN — OXYCODONE HYDROCHLORIDE PRN MG: 15 TABLET ORAL at 13:37

## 2017-12-10 RX ADMIN — LACTULOSE SCH GM: 20 SOLUTION ORAL at 15:55

## 2017-12-10 RX ADMIN — SERTRALINE HYDROCHLORIDE SCH MG: 50 TABLET, FILM COATED ORAL at 07:35

## 2017-12-10 RX ADMIN — OXYCODONE HYDROCHLORIDE PRN MG: 15 TABLET ORAL at 05:14

## 2017-12-10 RX ADMIN — FUROSEMIDE SCH MG: 40 TABLET ORAL at 21:24

## 2017-12-10 RX ADMIN — PROPRANOLOL HYDROCHLORIDE SCH: 20 TABLET ORAL at 12:26

## 2017-12-10 RX ADMIN — SPIRONOLACTONE SCH MG: 100 TABLET, FILM COATED ORAL at 21:24

## 2017-12-10 RX ADMIN — OXYCODONE HYDROCHLORIDE PRN MG: 15 TABLET ORAL at 00:24

## 2017-12-10 RX ADMIN — GABAPENTIN SCH MG: 300 CAPSULE ORAL at 21:24

## 2017-12-10 RX ADMIN — OXYCODONE HYDROCHLORIDE PRN MG: 15 TABLET ORAL at 23:04

## 2017-12-10 RX ADMIN — VANCOMYCIN HYDROCHLORIDE SCH MG: KIT at 11:33

## 2017-12-10 RX ADMIN — LACTULOSE SCH GM: 20 SOLUTION ORAL at 21:26

## 2017-12-10 RX ADMIN — OXYCODONE HYDROCHLORIDE PRN MG: 15 TABLET ORAL at 18:54

## 2017-12-10 RX ADMIN — OXYCODONE HYDROCHLORIDE PRN MG: 15 TABLET ORAL at 09:37

## 2017-12-10 RX ADMIN — VANCOMYCIN HYDROCHLORIDE SCH MG: KIT at 15:54

## 2017-12-10 RX ADMIN — ENOXAPARIN SODIUM SCH MG: 100 INJECTION SUBCUTANEOUS at 07:42

## 2017-12-10 RX ADMIN — GABAPENTIN SCH MG: 300 CAPSULE ORAL at 07:32

## 2017-12-10 RX ADMIN — VANCOMYCIN HYDROCHLORIDE SCH MLS: 1 INJECTION, POWDER, LYOPHILIZED, FOR SOLUTION INTRAVENOUS at 09:38

## 2017-12-10 NOTE — HOSPPROG
Hospitalist Progress Note


Assessment/Plan: 





# acute encephalopathy d/t infection


   - resolved


# MRSA bacteremia - possible cutaneous source


   - cont vanc, stop date 1/3/18


# c. dif colitis - cont vanc PO


# cirrhosis d/t HCV, etOH


   - cont lactulose, lasix, aldactone, propranolol (decrease dose today)


   - consider HCV treatment as outpatient


   - needs hepatology f/u


# chronic pain with continuous narcotic dependency


# anemia of chronic inflammation


Subjective: episode of emesis after taking lactulose; he is concerned because 

someone told him he is going to die tomorrow - i reassured him that this is not 

likely


Objective: 


 Vital Signs











Temp Pulse Resp BP Pulse Ox


 


 36.5 C   77   14   92/61 L  97 


 


 12/10/17 08:00  12/10/17 08:00  12/10/17 08:00  12/10/17 13:48  12/10/17 08:00








 Laboratory Results





 12/10/17 06:05 





 12/10/17 06:05 





 











 12/09/17 12/10/17 12/11/17





 05:59 05:59 05:59


 


Intake Total 1920 1718 


 


Balance 1920 1718 








 











PT  16.4 SEC (12.0-15.0)  H  12/04/17  12:20    


 


INR  1.30  (0.83-1.16)  H  12/04/17  12:20    














- Time Spent With Patient


Time Spent with Patient: greater than 25 minutes


Time Spent with Patient: Greater than 25 minutes spent on this patients care, 

greater than 50% of time spent counseling, educating, and coordinating care 

regarding the above mentioned plan.





- Physical Exam


Constitutional: no apparent distress, appears nourished


Cardiovascular: regular rate and rhythym, no murmur, rub, or gallop, systolic 

murmur


Respiratory: no respiratory distress, no rales or rhonchi, clear to auscultation


Gastrointestinal: normoactive bowel sounds, soft, non-tender abdomen, no 

palpable masses





ICD10 Worksheet


Patient Problems: 


 Problems











Problem Status Onset


 


Dyspnea Acute  


 


Liver cirrhosis Acute  


 


Hepatorenal failure Acute  


 


Hyperammonemia Acute  


 


Palliative care encounter Acute  


 


Altered mental status Acute  


 


C. difficile diarrhea Acute  ~05/12/17


 


Alcohol dependence Active  


 


Kidney disease Active  


 


Cellulitis Active  


 


Necrotizing fasciitis Active  


 


Acute renal failure Acute  


 


Squamous cell carcinoma Acute  


 


Alcoholism Chronic  


 


Gait instability Chronic  


 


Squamous cell cancer of skin of right shoulder Acute  


 


Hyperkalemia Acute  


 


Low back pain Chronic  


 


Hepatitis C Chronic  


 


Edema extremities Acute  


 


HTN (hypertension) Acute  


 


MRSA (methicillin resistant staph aureus) culture positive Acute  07/21/14


 


Pneumonia Acute  


 


Hyperbilirubinemia Acute  


 


Weakness Acute

## 2017-12-11 RX ADMIN — LACTULOSE SCH GM: 20 SOLUTION ORAL at 21:04

## 2017-12-11 RX ADMIN — OXYCODONE HYDROCHLORIDE PRN MG: 15 TABLET ORAL at 09:35

## 2017-12-11 RX ADMIN — LACTULOSE SCH GM: 20 SOLUTION ORAL at 15:15

## 2017-12-11 RX ADMIN — FUROSEMIDE SCH MG: 40 TABLET ORAL at 09:38

## 2017-12-11 RX ADMIN — GABAPENTIN SCH MG: 300 CAPSULE ORAL at 09:38

## 2017-12-11 RX ADMIN — ENOXAPARIN SODIUM SCH MG: 100 INJECTION SUBCUTANEOUS at 09:38

## 2017-12-11 RX ADMIN — SPIRONOLACTONE SCH MG: 100 TABLET, FILM COATED ORAL at 09:36

## 2017-12-11 RX ADMIN — VANCOMYCIN HYDROCHLORIDE SCH MG: KIT at 21:04

## 2017-12-11 RX ADMIN — VANCOMYCIN HYDROCHLORIDE SCH MG: KIT at 12:01

## 2017-12-11 RX ADMIN — OXYCODONE HYDROCHLORIDE PRN MG: 15 TABLET ORAL at 15:16

## 2017-12-11 RX ADMIN — VANCOMYCIN HYDROCHLORIDE SCH MLS: 1 INJECTION, POWDER, LYOPHILIZED, FOR SOLUTION INTRAVENOUS at 09:38

## 2017-12-11 RX ADMIN — GABAPENTIN SCH MG: 300 CAPSULE ORAL at 21:05

## 2017-12-11 RX ADMIN — SERTRALINE HYDROCHLORIDE SCH MG: 50 TABLET, FILM COATED ORAL at 09:38

## 2017-12-11 RX ADMIN — OXYCODONE HYDROCHLORIDE PRN MG: 15 TABLET ORAL at 05:21

## 2017-12-11 RX ADMIN — PROPRANOLOL HYDROCHLORIDE SCH MG: 20 TABLET ORAL at 21:05

## 2017-12-11 RX ADMIN — LACTULOSE SCH GM: 20 SOLUTION ORAL at 09:38

## 2017-12-11 RX ADMIN — OXYCODONE HYDROCHLORIDE PRN MG: 15 TABLET ORAL at 21:05

## 2017-12-11 RX ADMIN — GABAPENTIN SCH MG: 300 CAPSULE ORAL at 15:16

## 2017-12-11 RX ADMIN — PANTOPRAZOLE SODIUM SCH MG: 40 TABLET, DELAYED RELEASE ORAL at 09:36

## 2017-12-11 RX ADMIN — VANCOMYCIN HYDROCHLORIDE SCH MG: KIT at 15:15

## 2017-12-11 RX ADMIN — PROPRANOLOL HYDROCHLORIDE SCH MG: 20 TABLET ORAL at 09:36

## 2017-12-11 RX ADMIN — VANCOMYCIN HYDROCHLORIDE SCH MG: KIT at 05:21

## 2017-12-11 RX ADMIN — FUROSEMIDE SCH MG: 40 TABLET ORAL at 21:05

## 2017-12-11 RX ADMIN — FUROSEMIDE SCH MG: 40 TABLET ORAL at 15:16

## 2017-12-11 RX ADMIN — SPIRONOLACTONE SCH MG: 100 TABLET, FILM COATED ORAL at 21:05

## 2017-12-11 NOTE — HOSPPROG
Hospitalist Progress Note


Assessment/Plan: 





# acute encephalopathy d/t infection


   - resolved


# MRSA bacteremia - possible cutaneous source


   - cont vanc, stop date 1/3/18


# c. dif colitis - cont vanc PO, then suppressive while on vanc IV


# cirrhosis d/t HCV, etOH


   - cont lactulose, lasix, aldactone, propranolol (decrease dose today)


   - consider HCV treatment as outpatient


   - needs hepatology f/u


# chronic pain with continuous narcotic dependency


# anemia of chronic inflammation


Subjective: c/o ongoin abd pain (unchanged); no N/V/SOB/CP/diarrhea


Objective: 


 Vital Signs











Temp Pulse Resp BP Pulse Ox


 


 36.6 C   84   14   102/74   92 


 


 12/11/17 07:21  12/11/17 09:36  12/11/17 07:21  12/11/17 09:36  12/11/17 07:21








 Microbiology











 12/06/17 12:00 Blood Culture - Final





 Blood 


 


 12/06/17 12:03 Blood Culture - Final





 Blood 








 Laboratory Results





 12/10/17 06:05 





 12/10/17 06:05 





 











 12/10/17 12/11/17 12/12/17





 05:59 05:59 05:59


 


Intake Total 1718 1710 


 


Balance 1718 1710 








 











PT  16.4 SEC (12.0-15.0)  H  12/04/17  12:20    


 


INR  1.30  (0.83-1.16)  H  12/04/17  12:20    














- Physical Exam


Constitutional: no apparent distress, appears nourished


Cardiovascular: regular rate and rhythym, systolic murmur, No irregularly 

irregular, No diastolic murmur, No tachycardia, No bradycardia, No edema


Respiratory: no respiratory distress, no rales or rhonchi, clear to auscultation


Gastrointestinal: normoactive bowel sounds, soft, non-tender abdomen, no 

palpable masses





ICD10 Worksheet


Patient Problems: 


 Problems











Problem Status Onset


 


Dyspnea Acute  


 


Liver cirrhosis Acute  


 


Hepatorenal failure Acute  


 


Hyperammonemia Acute  


 


Palliative care encounter Acute  


 


Altered mental status Acute  


 


C. difficile diarrhea Acute  ~05/12/17


 


Alcohol dependence Active  


 


Kidney disease Active  


 


Cellulitis Active  


 


Necrotizing fasciitis Active  


 


Acute renal failure Acute  


 


Squamous cell carcinoma Acute  


 


Alcoholism Chronic  


 


Gait instability Chronic  


 


Squamous cell cancer of skin of right shoulder Acute  


 


Hyperkalemia Acute  


 


Low back pain Chronic  


 


Hepatitis C Chronic  


 


Edema extremities Acute  


 


HTN (hypertension) Acute  


 


MRSA (methicillin resistant staph aureus) culture positive Acute  07/21/14


 


Pneumonia Acute  


 


Hyperbilirubinemia Acute  


 


Weakness Acute

## 2017-12-11 NOTE — ASMTCMCOM
CM Note

 

CM Note                       

Notes:

Patient will need Abx for 3 more weeks per Dr. Winters. Patient has been accepted at [facility] but 


the ULTC 100 is in process. Spoke with Amador Alonso from [facility] who is gathering the last of the 

information he needs to complete ULTC.Completed the Geriatric Depression Scale and faxed to 

Amador. Amador is aware patient is ready for d/c. He is going to notify me as soon as he has the 

disposition. Left a message for Yulia with [facility] that we will be d/cing patient tomorrow if 


all goes well with [facility]. Patient himself is somewhat reluctant to go to [facility] but 

understands it is in his best interests to do so. Patient was informed his apartment is being 

cleaned which is necessary for his return home. Amador was given the nurse's number to call to go 

over patient's ADL's. CM will follow.

 

Date Signed:  12/11/2017 12:52 PM

Electronically Signed By:Rizwana Wade LCSW

## 2017-12-11 NOTE — PCMIDPN
Assessment/Plan: 


Assessment/Plan:


* MRSA bacteremia:  Clinically improved.  Continues on course of vancomycin 

with anticipated 4 week duration post negative blood cultures.  Stop date for 

vancomycin is 1/3/2018.  Plan monitoring with weekly CBC and CMP and twice 

weekly vancomycin trough/creatinine.


* C difficile colitis:  Completing course of oral vancomycin with improvement 

in symptoms.


* Hepatitis-C:  Can be addressed once completed therapy for MRSA bacteremia.





12/11/17 14:48





Subjective: 





Patient feels better without specific complaints.  Concerned about social 

circumstances.


Objective: 


 Vital Signs











Temp Pulse Resp BP Pulse Ox


 


 36.6 C   84   14   102/74   92 


 


 12/11/17 07:21  12/11/17 09:36  12/11/17 07:21  12/11/17 09:36  12/11/17 07:21








 Microbiology











 12/06/17 12:00 Blood Culture - Final





 Blood 


 


 12/06/17 12:03 Blood Culture - Final





 Blood 








 Laboratory Results





 12/10/17 06:05 





 12/10/17 06:05 





 











 12/10/17 12/11/17 12/12/17





 05:59 05:59 05:59


 


Intake Total 1718 1710 


 


Balance 1718 1710 








 











C-Reactive Protein  6.6 mg/L (<10.0)   12/05/17  04:54    








Vancomycin # 6


Oral vancomycin # 6


Blood cultures 12/6/2017 and 12/7/2017 no growth


 Laboratory Tests











  12/11/17





  08:00


 


Vancomycin Trough  15.0














- Physical Exam


General Appearance: alert, no apparent distress


EENT: No scleral icterus, No conjunctival petechiae


Respiratory: lungs clear, No respiratory distress


Cardiac/Chest: regular rate, rhythm


Extremities: No inflammation


Abdomen: non-tender, No distended


Skin: No embolic lesions





ICD10 Worksheet


Patient Problems: 


 Problems











Problem Status Onset


 


Altered mental status Acute  


 


Pneumonia Acute  


 


Alcohol dependence Active  


 


Cellulitis Active  


 


Kidney disease Active  


 


Necrotizing fasciitis Active  


 


Acute renal failure Acute  


 


C. difficile diarrhea Acute  ~05/12/17


 


Dyspnea Acute  


 


Edema extremities Acute  


 


HTN (hypertension) Acute  


 


Hepatorenal failure Acute  


 


Hyperammonemia Acute  


 


Hyperbilirubinemia Acute  


 


Hyperkalemia Acute  


 


Liver cirrhosis Acute  


 


MRSA (methicillin resistant staph aureus) culture positive Acute  07/21/14


 


Palliative care encounter Acute  


 


Squamous cell cancer of skin of right shoulder Acute  


 


Squamous cell carcinoma Acute  


 


Weakness Acute  


 


Alcoholism Chronic  


 


Gait instability Chronic  


 


Hepatitis C Chronic  


 


Low back pain Chronic

## 2017-12-12 VITALS — RESPIRATION RATE: 18 BRPM | DIASTOLIC BLOOD PRESSURE: 64 MMHG | SYSTOLIC BLOOD PRESSURE: 96 MMHG

## 2017-12-12 VITALS — HEART RATE: 80 BPM

## 2017-12-12 LAB
% IMMATURE GRANULYOCYTES: 0.2 % (ref 0–1.1)
ABSOLUTE IMMATURE GRANULOCYTES: 0.01 10^3/UL (ref 0–0.1)
ABSOLUTE NRBC COUNT: 0 10^3/UL (ref 0–0.01)
ADD DIFF?: NO
ADD MORPH?: NO
ADD SCAN?: NO
ANION GAP SERPL CALC-SCNC: 8 MEQ/L (ref 8–16)
ATYPICAL LYMPHOCYTE FLAG: 10 (ref 0–99)
CALCIUM SERPL-MCNC: 9 MG/DL (ref 8.5–10.4)
CHLORIDE SERPL-SCNC: 104 MEQ/L (ref 97–110)
CO2 SERPL-SCNC: 26 MEQ/L (ref 22–31)
CREAT SERPL-MCNC: 0.8 MG/DL (ref 0.7–1.3)
ERYTHROCYTE [DISTWIDTH] IN BLOOD BY AUTOMATED COUNT: 15.4 % (ref 11.5–15.2)
FRAGMENT RBC FLAG: 40 (ref 0–99)
GFR SERPL CREATININE-BSD FRML MDRD: > 60 ML/MIN/{1.73_M2}
GLUCOSE SERPL-MCNC: 90 MG/DL (ref 70–100)
HCT VFR BLD CALC: 27.4 % (ref 40–51)
HGB BLD-MCNC: 9.3 G/DL (ref 13.7–17.5)
LEFT SHIFT FLG: 0 (ref 0–99)
LIPEMIA HEMOLYSIS FLAG: 90 (ref 0–99)
MCH RBC BLDCO QN: 32.4 PG (ref 27.9–34.1)
MCHC RBC AUTO-ENTMCNC: 33.9 G/DL (ref 32.4–36.7)
MCV RBC AUTO: 95.5 FL (ref 81.5–99.8)
NRBC-AUTO%: 0 % (ref 0–0.2)
PLATELET # BLD: 131 10^3/UL (ref 150–400)
PLATELET CLUMPS FLAG: 20 (ref 0–99)
PMV BLD AUTO: 11 FL (ref 8.7–11.7)
POTASSIUM SERPL-SCNC: 3.8 MEQ/L (ref 3.5–5.2)
RBC # BLD AUTO: 2.87 10^6/UL (ref 4.4–6.38)
SODIUM SERPL-SCNC: 138 MEQ/L (ref 134–144)

## 2017-12-12 RX ADMIN — VANCOMYCIN HYDROCHLORIDE SCH MLS: 1 INJECTION, POWDER, LYOPHILIZED, FOR SOLUTION INTRAVENOUS at 08:44

## 2017-12-12 RX ADMIN — OXYCODONE HYDROCHLORIDE PRN MG: 15 TABLET ORAL at 21:57

## 2017-12-12 RX ADMIN — GABAPENTIN SCH MG: 300 CAPSULE ORAL at 08:45

## 2017-12-12 RX ADMIN — FUROSEMIDE SCH MG: 40 TABLET ORAL at 08:47

## 2017-12-12 RX ADMIN — GABAPENTIN SCH MG: 300 CAPSULE ORAL at 21:56

## 2017-12-12 RX ADMIN — VANCOMYCIN HYDROCHLORIDE SCH MG: KIT at 21:58

## 2017-12-12 RX ADMIN — OXYCODONE HYDROCHLORIDE PRN MG: 15 TABLET ORAL at 18:36

## 2017-12-12 RX ADMIN — SPIRONOLACTONE SCH MG: 100 TABLET, FILM COATED ORAL at 08:45

## 2017-12-12 RX ADMIN — VANCOMYCIN HYDROCHLORIDE SCH MG: KIT at 12:13

## 2017-12-12 RX ADMIN — VANCOMYCIN HYDROCHLORIDE SCH MG: KIT at 15:48

## 2017-12-12 RX ADMIN — VANCOMYCIN HYDROCHLORIDE SCH: KIT at 06:11

## 2017-12-12 RX ADMIN — SERTRALINE HYDROCHLORIDE SCH MG: 50 TABLET, FILM COATED ORAL at 08:45

## 2017-12-12 RX ADMIN — OXYCODONE HYDROCHLORIDE PRN MG: 15 TABLET ORAL at 10:04

## 2017-12-12 RX ADMIN — SPIRONOLACTONE SCH MG: 100 TABLET, FILM COATED ORAL at 21:57

## 2017-12-12 RX ADMIN — ENOXAPARIN SODIUM SCH MG: 100 INJECTION SUBCUTANEOUS at 08:49

## 2017-12-12 RX ADMIN — PROPRANOLOL HYDROCHLORIDE SCH MG: 20 TABLET ORAL at 21:55

## 2017-12-12 RX ADMIN — GABAPENTIN SCH MG: 300 CAPSULE ORAL at 15:47

## 2017-12-12 RX ADMIN — LACTULOSE SCH GM: 20 SOLUTION ORAL at 15:48

## 2017-12-12 RX ADMIN — FUROSEMIDE SCH MG: 40 TABLET ORAL at 21:58

## 2017-12-12 RX ADMIN — LACTULOSE SCH GM: 20 SOLUTION ORAL at 08:50

## 2017-12-12 RX ADMIN — PANTOPRAZOLE SODIUM SCH MG: 40 TABLET, DELAYED RELEASE ORAL at 08:45

## 2017-12-12 RX ADMIN — PROPRANOLOL HYDROCHLORIDE SCH MG: 20 TABLET ORAL at 08:46

## 2017-12-12 RX ADMIN — FUROSEMIDE SCH MG: 40 TABLET ORAL at 15:47

## 2017-12-12 RX ADMIN — LACTULOSE SCH GM: 20 SOLUTION ORAL at 22:36

## 2017-12-12 NOTE — ASMTCMCOM
CM Note

 

CM Note                       

Notes:

Spoke with Yulia at [facility] who states they have Norovirus in the facility and cannot take 

any new patients as a result. Yulia contacted [facility] and I sent a referral to [facility] for patient to 


get rehab with their facility. Left another message for Amador Alonso with [facility] re: patient's ULTC 

100. [facility] is aware patient needs to d/c today. Awaiting phone call from [facility] to finalize 

details. CM will follow.

 

Date Signed:  12/12/2017 11:39 AM

Electronically Signed By:Rizwana Wade LCSW

## 2017-12-12 NOTE — ASMTCMCOM
CM Note

 

CM Note                       

Notes:

Patient has been accepted by [facility] for his rehab. Gallo with [facility] needs the [id number]. Several 

messages were left for Amador Alonso re: the ULTC but he has not returned the calls. Also left a 

message for Dileep Luciano to see if he can help us. Gallo will set up transport once [facility] calls us 

back. Spoke with patient who is ok with going to [facility] instead of [facility]. Patient is ready 

for d/c. CM will follow.

 

Date Signed:  12/12/2017 05:05 PM

Electronically Signed By:Rizwana Wade LCSW

## 2017-12-13 VITALS — OXYGEN SATURATION: 94 % | TEMPERATURE: 97.9 F

## 2017-12-13 RX ADMIN — GABAPENTIN SCH MG: 300 CAPSULE ORAL at 08:11

## 2017-12-13 RX ADMIN — LACTULOSE SCH GM: 20 SOLUTION ORAL at 08:15

## 2017-12-13 RX ADMIN — OXYCODONE HYDROCHLORIDE PRN MG: 15 TABLET ORAL at 11:44

## 2017-12-13 RX ADMIN — PANTOPRAZOLE SODIUM SCH MG: 40 TABLET, DELAYED RELEASE ORAL at 08:09

## 2017-12-13 RX ADMIN — SERTRALINE HYDROCHLORIDE SCH MG: 50 TABLET, FILM COATED ORAL at 08:10

## 2017-12-13 RX ADMIN — PROPRANOLOL HYDROCHLORIDE SCH MG: 20 TABLET ORAL at 08:10

## 2017-12-13 RX ADMIN — LACTULOSE SCH GM: 20 SOLUTION ORAL at 15:43

## 2017-12-13 RX ADMIN — FUROSEMIDE SCH MG: 40 TABLET ORAL at 15:43

## 2017-12-13 RX ADMIN — ENOXAPARIN SODIUM SCH MG: 100 INJECTION SUBCUTANEOUS at 08:11

## 2017-12-13 RX ADMIN — OXYCODONE HYDROCHLORIDE PRN MG: 15 TABLET ORAL at 05:10

## 2017-12-13 RX ADMIN — FUROSEMIDE SCH MG: 40 TABLET ORAL at 08:09

## 2017-12-13 RX ADMIN — SPIRONOLACTONE SCH MG: 100 TABLET, FILM COATED ORAL at 08:09

## 2017-12-13 RX ADMIN — GABAPENTIN SCH MG: 300 CAPSULE ORAL at 15:43

## 2017-12-13 RX ADMIN — VANCOMYCIN HYDROCHLORIDE SCH MG: KIT at 05:10

## 2017-12-13 RX ADMIN — VANCOMYCIN HYDROCHLORIDE SCH MG: KIT at 11:45

## 2017-12-13 RX ADMIN — VANCOMYCIN HYDROCHLORIDE SCH MLS: 1 INJECTION, POWDER, LYOPHILIZED, FOR SOLUTION INTRAVENOUS at 08:09

## 2017-12-13 RX ADMIN — OXYCODONE HYDROCHLORIDE PRN MG: 15 TABLET ORAL at 15:53

## 2017-12-13 NOTE — PDIAF
- Diagnosis


Diagnosis: MRSA bacteremia


Code Status: Do Not Resuscitate





- Medication Management


Discharge Medications: 


 Medications to Continue on Transfer





Furosemide [Lasix 40 MG (*)] 40 mg PO TID 12/04/17 [Last Taken Unknown]


Gabapentin [Neurontin 300 MG (*)] 1,200 mg PO TID 12/04/17 [Last Taken Unknown]


LORazepam [Ativan (*)] 1 mg PO TID 12/04/17 [Last Taken Unknown]


Lactulose 30 gm PO TID 12/04/17 [Last Taken Unknown]


Omeprazole 20 mg PO DAILY 12/04/17 [Last Taken Unknown]


Oxycodone HCl [Oxyir] 10 mg PO Q4 PRN 12/04/17 [Last Taken Unknown]


Propranolol HCl [Inderal 20mg (*)] 20 mg PO BID 12/04/17 [Last Taken Unknown]


Sertraline HCl [Zoloft 50mg (*)] 50 mg PO DAILY 12/04/17 [Last Taken Unknown]


Spironolactone 100 mg PO BID 12/04/17 [Last Taken Unknown]


traMADol [Ultram 50 mg (*)] 50 mg PO BID PRN 12/04/17 [Last Taken Unknown]


Vancomycin [Vancocin Oral Liquid] 125 mg PO QID #0 udl 12/13/17 [Last Taken 

Unknown]


Vancomycin [Vancomycin (*)] 1.5 gm IV DAILY  vial 12/13/17 [Last Taken Unknown]








Long Term Antibiotics: Vancomycin 1.5 g IV q 24 hours; 


Long Term Antibiotic Stop Date: 01/03/18


Discharge Medications: Refer to the Discharge Home Medication list for PRN 

reason.


PICC Care - Routine: Yes





- Orders


Services needed: Registered Nurse


Isolation Type: CDIFF Isolation, Contact Isolation


Diet Texture: Regular Texture Diet, Thin Liquids, Meds Whole w/Liquids





- Labs/Radiology


CBC w/diff Date: 12/18/17 (weekly)


CMP Date: 12/18/17 (weekly)


Vanco Trough Date and Time: 12/18/17 then q MON and THURS until end date


Call or Fax Lab and Imaging Results to: Dr. Simran Amato [phone]





- Follow Up Care


Current Providers and Referrals: 


Inessa Vargas DO [Primary Care Provider] - As per Instructions


Suresh Jones [Medical Doctor] - 


Simran Amato MD [Medical Doctor] -

## 2017-12-13 NOTE — PDIAF
- Diagnosis


Diagnosis: MRSA bacteremia


Code Status: Do Not Resuscitate





- Medication Management


Discharge Medications: 


 Medications to Continue on Transfer





Furosemide [Lasix 40 MG (*)] 40 mg PO TID 12/04/17 [Last Taken Unknown]


Gabapentin [Neurontin 300 MG (*)] 1,200 mg PO TID 12/04/17 [Last Taken Unknown]


LORazepam [Ativan (*)] 1 mg PO TID 12/04/17 [Last Taken Unknown]


Lactulose 30 gm PO TID 12/04/17 [Last Taken Unknown]


Omeprazole 20 mg PO DAILY 12/04/17 [Last Taken Unknown]


Oxycodone HCl [Oxyir] 10 mg PO Q4 PRN 12/04/17 [Last Taken Unknown]


Propranolol HCl [Inderal 20mg (*)] 20 mg PO BID 12/04/17 [Last Taken Unknown]


Sertraline HCl [Zoloft 50mg (*)] 50 mg PO DAILY 12/04/17 [Last Taken Unknown]


Spironolactone 100 mg PO BID 12/04/17 [Last Taken Unknown]


traMADol [Ultram 50 mg (*)] 50 mg PO BID PRN 12/04/17 [Last Taken Unknown]


Vancomycin [Vancocin Oral Liquid] 125 mg PO QID #0 udl 12/13/17 [Last Taken 

Unknown]


Vancomycin [Vancomycin (*)] 1.5 gm IV DAILY  vial 12/13/17 [Last Taken Unknown]








Long Term Antibiotics: Vancomycin 1.5 g IV q 24 hours


Long Term Antibiotic Stop Date: 01/03/18


Discharge Medications: Refer to the Discharge Home Medication list for PRN 

reason.


PICC Care - Routine: Yes





- Orders


Services needed: Registered Nurse


Isolation Type: CDIFF Isolation, Contact Isolation


Diet Texture: Regular Texture Diet, Thin Liquids, Meds Whole w/Liquids





- Labs/Radiology


CBC w/diff Date: 12/18/17 (weekly)


CMP Date: 12/18/17 (weekly)


Vanco Trough Date and Time: 12/18/17 then q MON and THURS until end date


Call or Fax Lab and Imaging Results to: Dr. Simran Amato [phone]





- Follow Up Care


Current Providers and Referrals: 


Inessa Vargas DO [Primary Care Provider] - As per Instructions


Suresh Jones [Medical Doctor] - 


Simran Amato MD [Medical Doctor] -

## 2017-12-13 NOTE — PDIAF
- Diagnosis


Diagnosis: MRSA bacteremia


Code Status: Do Not Resuscitate





- Medication Management


Discharge Medications: 


 Medications to Continue on Transfer





Furosemide [Lasix 40 MG (*)] 40 mg PO TID 12/04/17 [Last Taken Unknown]


Gabapentin [Neurontin 300 MG (*)] 1,200 mg PO TID 12/04/17 [Last Taken Unknown]


LORazepam [Ativan (*)] 1 mg PO TID 12/04/17 [Last Taken Unknown]


Lactulose 30 gm PO TID 12/04/17 [Last Taken Unknown]


Omeprazole 20 mg PO DAILY 12/04/17 [Last Taken Unknown]


Oxycodone HCl [Oxyir] 10 mg PO Q4 PRN 12/04/17 [Last Taken Unknown]


Propranolol HCl [Inderal 20mg (*)] 20 mg PO BID 12/04/17 [Last Taken Unknown]


Sertraline HCl [Zoloft 50mg (*)] 50 mg PO DAILY 12/04/17 [Last Taken Unknown]


Spironolactone 100 mg PO BID 12/04/17 [Last Taken Unknown]


traMADol [Ultram 50 mg (*)] 50 mg PO BID PRN 12/04/17 [Last Taken Unknown]


Vancomycin [Vancocin Oral Liquid] 125 mg PO QID #0 udl 12/13/17 [Last Taken 

Unknown]


Vancomycin [Vancomycin (*)] 1.5 gm IV DAILY  vial 12/13/17 [Last Taken Unknown]








Long Term Antibiotics: Vancomycin 1.5 g IV q 24 hours; 


Long Term Antibiotic Stop Date: 01/03/18


Discharge Medications: Refer to the Discharge Home Medication list for PRN 

reason.


PICC Care - Routine: Yes





- Orders


Services needed: Registered Nurse


Isolation Type: CDIFF Isolation, Contact Isolation


Diet Recommendation: sodium restricted


Diet Texture: Regular Texture Diet, Thin Liquids, Meds Whole w/Liquids


Weigh Patient: weekly





- Labs/Radiology


CBC w/diff Date: 12/18/17 (weekly)


CMP Date: 12/18/17 (weekly)


Vanco Trough Date and Time: 12/18/17 then q MON and THURS until end date


Call or Fax Lab and Imaging Results to: Dr. Simran Amato [phone]





- Follow Up Care


Current Providers and Referrals: 


Inessa Vargas DO [Primary Care Provider] - As per Instructions


Suresh Jones [Medical Doctor] - 


Simran Amato MD [Medical Doctor] -

## 2017-12-13 NOTE — ASDISCHSUM
----------------------------------------------

Discharge Information

----------------------------------------------

Plan Status:SNF                                      Medically Cleared to Leave:

Discharge Date:12/13/2017 03:55 PM                   CM D/C Disposition:Skilled Nursing Facility

ADT D/C Disposition:Skilled Nursing Facility         Projected Discharge Date:12/13/2017 11:00 AM

Transportation at D/C:Wheelchair Van                 Discharge Delay Reason:

Follow-Up Date:12/13/2017 11:00 AM                   Discharge Slot:

Final Diagnosis:

----------------------------------------------

Placement Information

----------------------------------------------

Referral Type:*Nursing Home/SNF                      Referral ID:[id number]

Provider Name:

Address 1:                                           Phone Number:

Address 2:                                           Fax Number:

City:                                                Selection Factors:

State:

 

Referral Type:*Nursing Home/SNF                      Referral ID:[id number]

Provider Name:[facility]

Address 1:[facility] [address]                       Phone [phone]

Address 2:                                           Fax [phone]

City:[place]                                          Selection Factors:

State:[place]

 

----------------------------------------------

Patient Contact Information

----------------------------------------------

Contact Name:MONICA                            Relationship:Sister

Address:                                             Home [phone]

                                                     Work Phone:

City:                                                [facility] Phone:

State/Zip Code:                                      Email:

----------------------------------------------

Financial Information

----------------------------------------------

Financial Class:MD

[place] Desc[place] [place]         Primary Plan Number:[id number]

Secondary Plan Desc:                                 Secondary Plan Number:

 

 

----------------------------------------------

Assessment Information

----------------------------------------------

----------------------------------------------

[facility] Initial CM Assessment

----------------------------------------------

Living Arrangements

 

What is your living           Answers:  Alone                                 

arrangement? Who do you                                                       

live with?                                                                    

Type Of Residence

 

What kind of residence do     Answers:  Apartment                             

you live in?                                                                  

Discharge Plan Comments

 

Coordination Status           

Comments                      

Notes:

Patient is a 58yo male who was admitted for acute encephalopathy evidenced by global brain 

dysfunction with confusion and disorientation. These are acute changes from patient's baseline and 

most likely secondary to the toxic effects of infection. Patient lives alone but has his own 

apartment. OT/PT/SPL have been ordered. Spoke with patient today who was worried about his care 

when he leaves the hospital. We discussed getting the therapy recommendations and then making plans 


from there. Patient was reassured we would help get services in place to support him. CM will 

follow.

 

Date Signed:  12/05/2017 04:44 PM

Electronically Signed By:Rizwana Wade LCSW

 

 

----------------------------------------------

[facility] Progress Note

----------------------------------------------

CM Note

 

CM Note                       

Notes:

PT and OT recommending SNF.  



Kendrick called Yony (Cell): [phone] at [facility] where Pt. lives to coordinate 

care.  Yony visited Pt. earlier today.  States that it would be better if Pt. went to SNF before 

returning to [facility] so that he could be stronger before resuming his homecare.  Yony mentioned 

that Pt. is completely sober at this time.   



Yulia from [facility] states Pt. is welcome back.  Should Pt. choose SNF, will likely need new 

ULTC 100. 



Kendrick went to meet w/ Pt. today to find out his preferences.  Pt. today states he is interested in 

"going home to [his] apartment with hospice".  Kendrick coordinated w/ Palliative NP for a potential 

consult for tomorrow.  Consulted with MD who is up to date.  Kendrick/DEWAYNE to follow for d/c POC.   

 

Date Signed:  12/06/2017 04:07 PM

Electronically Signed By:Stephanie Hinton LCSW

 

 

----------------------------------------------

[facility] CM Progress Note

----------------------------------------------

CM Note

 

CM Note                       

Notes:

Today Ludar and Palliative Care NP went to visit Pt.  Pt. clearly stated he was not yet ready to die 


and wanted treatment.  Explained that treatment would involve increasing nursing care and IV 

antibiotics.  Asked Pt. if we could call in Yony,  from [facility] 

C: [phone].  Pt. agreed.  Yony came in today and SWer met with him and Pt in room.  Yony 

was able to help discuss the reality of it being too hard for Pt. to go straight home to his 

apt. after hospitalization.  Yony still working on getting  in to Pt's apt. due to 

Pt. having diarrhea all over the apt.  Pt. agreed to go to [facility] although he wished he 

didn't have to.  Sent referral to [facility] through [employer].  Pt. has been accepted when he 

is ready pending [facility] approval.



SWer completed ULTC 100 and faxed to [facility] today.  Faxed-attached completed ULTC document to 

[employer].



Also assisted Pt. in calling his sister, Kierra Wells [phone].  Pt. and sister 

reconnected.  Pt. would like Kierra to be his MDPOA.  Completed paperwork.  Original in chart and 

copy to medical records.  Gave Kierra contact information for [facility] 3E floor and case 

management.  Kierra's  Earnest Wells can be contacted at [phone].  Kierra and Earnest 

live in [place], [place].  



Ludar coordinated w/ nursing to find medication reciept in front of hard chart.  Yony from [facility] said Pt. came in with a "bag" of home medications and a large medication manager 

box.  Yony states there are plans to get Pt. an electronic one.  



CM to follow for d/c to [facility] LT stay.   



 

 

Date Signed:  12/08/2017 04:18 PM

Electronically Signed By:Stephanie Hinton LCSW

 

 

----------------------------------------------

[facility] CM Progress Note

----------------------------------------------

CM Note

 

CM Note                       

Notes:

Patient will need Abx for 3 more weeks per Dr. Winters. Patient has been accepted at [facility] but 


the ULTC 100 is in process. Spoke with Amador Alonso from [facility] who is gathering the last of the 

information he needs to complete ULTC.Completed the Geriatric Depression Scale and faxed to 

Amador. Amador is aware patient is ready for d/c. He is going to notify me as soon as he has the 

disposition. Left a message for Yulia with [facility] that we will be d/cing patient tomorrow if 


all goes well with [facility]. Patient himself is somewhat reluctant to go to [facility] but 

understands it is in his best interests to do so. Patient was informed his apartment is being 

cleaned which is necessary for his return home. Amador was given the nurse's number to call to go 

over patient's ADL's. CM will follow.

 

Date Signed:  12/11/2017 12:52 PM

Electronically Signed By:Rizwana Wade LCSW

 

 

----------------------------------------------

[facility] CM Progress Note

----------------------------------------------

CM Note

 

CM Note                       

Notes:

Spoke with Yulia at [facility] who states they have Norovirus in the facility and cannot take 

any new patients as a result. Yulia contacted [facility] and I sent a referral to [facility] for patient to 


get rehab with their facility. Left another message for Amador Alonso with [facility] re: patient's ULTC 

100. [facility]. Awaiting phone call from Gallo to finalize 

details. CM will follow.

 

Date Signed:  12/12/2017 11:39 AM

Electronically Signed By:Rizwana Wade LCSW

 

 

----------------------------------------------

[facility] CM Progress Note

----------------------------------------------

CM Note

 

CM Note                       

Notes:

Patient has been accepted by [facility] for his rehab. Gallo with [facility] needs the [id number]. Several 

messages were left for Amador Rushingzachary re: the ULTC but he has not returned the calls. Also left a 

message for Dileep Luciano to see if he can help us. [facility] will set up transport once [facility] calls us 

back. Spoke with patient who is ok with going to [facility] instead of [facility]. Patient is ready 

for d/c. CM will follow.

 

Date Signed:  12/12/2017 05:05 PM

Electronically Signed By:Rizwana Wade LCSW

 

 

----------------------------------------------

Case Management Discharge Plan Note

----------------------------------------------

Case Management Discharge

 

Discharge Order Complete?     Answers:  Yes                                   

Patient to Obtain             Answers:  Other                         Notes:  [facility]

Medications                                                                   

Transportation Arranged       Answers:  Other                         Notes:  Wheelchair van

Faxed Final Orders            Answers:  Yes                           Notes:  via [employer]

Agency/Facility Transfer      Answers:  No                                    

Report Printed & Faxed to                                                     

Receiving Agency                                                              

Family Notified               Answers:  No                                    

Discharge Comments            

Notes:

[facility]'s Amador Delacruzin [phone] sent approval paperwork to [facility].  [facility] with 

[facility] for 16:00 d/c.  Pt. d/c'ed to [facility] facility in [facility].  Wheelchair transport arranged by [facility].  Pt. pleased with plan and waved goodbye 

while thanking staff.  

 

Date Signed:  12/13/2017 04:26 PM

Electronically Signed By:Stephanie Hinton LCSW

 

 

----------------------------------------------

Intervention Information

----------------------------------------------

## 2017-12-13 NOTE — GDS
[f rep st]



                                                             DISCHARGE SUMMARY





FINAL DIAGNOSES:  

1.  Acute encephalopathy, multifactorial.  

2.  Methicillin-resistant Staphylococcus aureus bacteremia. 

3.  Clostridium difficile.  

4.  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  

5.  Chronic pain with continuous narcotic dependency.

6.  Anemia of chronic inflammation.



CONSULTATIONS:  

1.  Infectious Disease.  

2.  Palliative Care.



HOSPITAL COURSE:  

1.  Acute encephalopathy:  This is multifactorial in the setting of liver disease, as well as 2 infec
tions.  This is completely resolved.  He needs to continue his lactulose.

2.  MRSA bacteremia:  No clear source, though cutaneous is suspected.  He has been on intravenous van
comycin.  The stop date will be January 3, 2018.  Followup cultures were clear.  Imaging of his spine
 was negative for an epidural source. Echocardiogram was negative for endocarditis.  

3.  Clostridium difficile colitis:  He is currently on oral vancomycin.  He will need to continue thi
s for an additional 4 days at 125 mg 4 times daily.  Then he should continue twice daily suppressive 
dosing until 1 week after he has completed his intravenous vancomycin.  This is significantly improve
d.  

4.  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  We will c
ontinue his lactulose, Lasix, Aldactone, and propranolol.  He needs to follow up with Dr. Jones, he
patologist as an outpatient.  I have communicated this to him.  He should follow up with Sarah landrum for consideration of treatment of hepatitis C.  This should be done after he has completed his
 antibiotic course.

5.  Chronic pain with continuous narcotic dependency.



BILLING:  I spent more than 30 minutes on the day of discharge coordinating care.





Job #:  [id number]

## 2017-12-13 NOTE — PCMIDPN
Assessment/Plan: 


Assessment:


MRSA bacteremia unclear source.  No spinal or abdominal source seen.  Patient 

also has C diff colitis.  Being treated with both IV and oral vancomycin.  

Clinically looks much better today than last week.  Possible transition over to 

a skilled nursing facility to complete treatment for both issues.





25 minutes of floor time spent on this patient today.








Plan:


1. Continue IV vancomycin.  Plan a total of a 4 week course from blood culture 

clearance.


2. Complete a 14 day course of oral vancomycin.


3. Probable transition over to a skilled nursing facility.





12/13/17 13:13





12/13/17 13:15





Subjective: 





Patient resting in his hospital bed.  He looks and feels much better than he 

did last week.  Denies any fevers or chills.  Denies diarrhea.  Denies 

abdominal pain.


Objective: 


Vancomycin IV # 8


Vancomycin p.o. # 8 Vital Signs











Temp Pulse Resp BP Pulse Ox


 


 36.6 C   80   18   96/64 L  94 


 


 12/13/17 08:00  12/13/17 08:10  12/13/17 08:00  12/13/17 08:10  12/13/17 08:00








 Microbiology











 12/07/17 05:00 Blood Culture - Final





 Blood 


 


 12/07/17 05:42 Blood Culture - Final





 Blood 








 Laboratory Results





 12/12/17 05:02 





 12/12/17 05:02 





 











 12/12/17 12/13/17 12/14/17





 05:59 05:59 05:59


 


Intake Total 1900 2280 


 


Output Total 100  


 


Balance 1800 2280 








 











C-Reactive Protein  6.6 mg/L (<10.0)   12/05/17  04:54    














- Physical Exam


General Appearance: WD/WN, alert, no apparent distress, non-toxic


Respiratory: lungs clear, normal breath sounds, No respiratory distress


Cardiac/Chest: regular rate, rhythm, No tachycardia


Abdomen: non-tender, soft


Skin: normal color, warm/dry, No rash


Neuro/Psych: alert, normal mood/affect, oriented x 3





ICD10 Worksheet


Patient Problems: 


 Problems











Problem Status Onset


 


Altered mental status Acute  


 


Pneumonia Acute  


 


Alcohol dependence Active  


 


Cellulitis Active  


 


Kidney disease Active  


 


Necrotizing fasciitis Active  


 


Acute renal failure Acute  


 


C. difficile diarrhea Acute  ~05/12/17


 


Dyspnea Acute  


 


Edema extremities Acute  


 


HTN (hypertension) Acute  


 


Hepatorenal failure Acute  


 


Hyperammonemia Acute  


 


Hyperbilirubinemia Acute  


 


Hyperkalemia Acute  


 


Liver cirrhosis Acute  


 


MRSA (methicillin resistant staph aureus) culture positive Acute  07/21/14


 


Palliative care encounter Acute  


 


Squamous cell cancer of skin of right shoulder Acute  


 


Squamous cell carcinoma Acute  


 


Weakness Acute  


 


Alcoholism Chronic  


 


Gait instability Chronic  


 


Hepatitis C Chronic  


 


Low back pain Chronic

## 2018-01-26 ENCOUNTER — HOSPITAL ENCOUNTER (INPATIENT)
Dept: HOSPITAL 80 - FED | Age: 58
LOS: 6 days | Discharge: HOME HEALTH | DRG: 193 | End: 2018-02-01
Attending: FAMILY MEDICINE | Admitting: FAMILY MEDICINE
Payer: MEDICAID

## 2018-01-26 DIAGNOSIS — K70.30: ICD-10-CM

## 2018-01-26 DIAGNOSIS — F17.200: ICD-10-CM

## 2018-01-26 DIAGNOSIS — G93.49: ICD-10-CM

## 2018-01-26 DIAGNOSIS — J18.8: Primary | ICD-10-CM

## 2018-01-26 DIAGNOSIS — Z66: ICD-10-CM

## 2018-01-26 DIAGNOSIS — B19.20: ICD-10-CM

## 2018-01-26 DIAGNOSIS — D63.8: ICD-10-CM

## 2018-01-26 DIAGNOSIS — G89.29: ICD-10-CM

## 2018-01-26 LAB
INR PPP: 1.18 (ref 0.83–1.16)
PLATELET # BLD: 202 10^3/UL (ref 150–400)
PROTHROMBIN TIME: 15.2 SEC (ref 12–15)

## 2018-01-26 RX ADMIN — LACTULOSE SCH GM: 20 SOLUTION ORAL at 20:12

## 2018-01-26 RX ADMIN — OXYCODONE HYDROCHLORIDE PRN MG: 15 TABLET ORAL at 22:11

## 2018-01-26 RX ADMIN — VANCOMYCIN HYDROCHLORIDE SCH MLS: 1 INJECTION, POWDER, LYOPHILIZED, FOR SOLUTION INTRAVENOUS at 22:08

## 2018-01-26 RX ADMIN — LACTULOSE SCH GM: 20 SOLUTION ORAL at 22:08

## 2018-01-26 RX ADMIN — SODIUM CHLORIDE SCH MLS: 900 INJECTION, SOLUTION INTRAVENOUS at 20:04

## 2018-01-26 NOTE — EDPHY
H & P


Time Seen by Provider: 01/26/18 15:13


HPI/ROS: 





CHIEF COMPLAINT:  Altered mental status





HISTORY OF PRESENT ILLNESS:  The patient is a 57-year-old male with the history 

of end-stage cirrhosis and history of encephalopathy who presents to the 

emergency department with altered mental status.  The patient is currently 

staying in the [facility].  This is not a health care facility.  He 

was found on the floor with altered mental status.  He was covered in feces.  

The staff there were concerned that he may have taken an overdose but this was 

not witnessed.  Patient states that he has end-stage liver disease and that is 

[place] having his symptoms. He states he feels "terrible.  "He denies chest pain 

or shortness of breath.  No abdominal pain. He denies recent nausea or 

vomiting.  Denies fever.  He denies overdosing or having suicidal ideation.  

The patient's nurse practitioner contacted the nursing staff prior to his 

arrival in stated that [facility] was not a medical Center.  He also 

stated that the patient is on palliative care for his end-stage liver disease.





REVIEW OF SYSTEMS:  


My complete review of systems is negative except as mentioned in the HPI.


Past Medical/Surgical History: 





Includes acute cephalopathy, MRI say bacteremia, C diff, cirrhosis, hepatitis-C

, alcoholism, narcotic dependency, anemia, low back pain, hyperkalemia, 

hyperlipidemia, necrotizing fasciitis, squamous cell carcinoma





Past surgical history:  Includes resection of leg infection





Social history:  The patient denies drinking alcohol.


Smoking Status: Never smoked


Physical Exam: 





Vitals noted.  Hypertensive.  Afebrile.


GENERAL:  No acute distress, alert.


HEENT:  Eyes normal to inspection, normal pharynx, no signs of dehydration.


NECK:  No thyromegaly, no lymphadenopathy, supple.


RESPIRATORY:  Clear to auscultation bilaterally, no rales, rhonchi or wheezing.


CVS:  Regular rate and rhythm, no rubs, murmurs, or gallops.


ABDOMEN:  Soft, nontender, nondistended, hepatomegaly.


BACK:  Normal to inspection, no CVA tenderness.


SKIN:  Patchy colored skin.  No jaundice. No rash, warm, dry.  No pallor.


EXTREMITIES:  No pedal edema, no calf tenderness, no Homans sign or cords, no 

joint swelling. Surgical site in the left lower extremity.


NEURO/PSYCH:  Alert and oriented x2, normal mood and affect, normal motor 

sensory exam.  No obvious cranial nerve deficit.


Constitutional: 


 Initial Vital Signs











Temperature (C)  36.5 C   01/26/18 14:56


 


Heart Rate  66   01/26/18 14:56


 


Respiratory Rate  16   01/26/18 14:56


 


Blood Pressure  89/54 L  01/26/18 14:56


 


O2 Sat (%)  95   01/26/18 14:56








 











O2 Delivery Mode               Nasal Cannula


 


O2 (L/minute)                  4














Allergies/Adverse Reactions: 


 





Unable to Assess Allergy (Verified 12/04/17 12:25)


 








Home Medications: 














 Medication  Instructions  Recorded


 


Furosemide [Lasix 40 MG (*)] 40 mg PO TID 12/04/17


 


Gabapentin [Neurontin 300 MG (*)] 1,200 mg PO TID 12/04/17


 


LORazepam [Ativan (*)] 1 mg PO TID 12/04/17


 


Omeprazole 20 mg PO DAILY 12/04/17


 


Oxycodone HCl [Oxyir] 10 mg PO Q4 PRN 12/04/17


 


Propranolol HCl [Inderal 20mg (*)] 20 mg PO BID 12/04/17


 


Sertraline HCl [Zoloft 50mg (*)] 50 mg PO DAILY 12/04/17


 


Spironolactone 100 mg PO BID 12/04/17














Medical Decision Making





- Diagnostics


EKG Interpretation: 





Sinus rhythm at 69.  Right bundle branch block.


Imaging Results: 


 Imaging Impressions





Chest X-Ray  01/26/18 15:36


Impression:  Left basilar consolidation (pneumonia versus atelectasis) with 

associated small left effusion.











ED Course/Re-evaluation: 





In the emergency department I discussed possible etiologies with the patient.  

I discussed plan with the nurse.  Patient had an IV placed.  Laboratory studies 

including blood cultures were obtained.  Patient was given normal saline 500 mL 

IV for his hypotension.





I reviewed the patient's laboratory studies.  His white count is normal. He is 

mildly anemic with hematocrit of 33.  He is mildly elevated total bili at 1.9.  

His AST and ALT unremarkable. His ammonia is 30.  Patient's creatinine is 

elevated at 1.9 with an elevated BUN of 50.  Patient's INR is 1.18.  His lactic 

acid is 1.0.





Chest x-ray:  Please refer+ the dictated report.  The patient has an effusion 

on the left.





I discussed the results with the patient.  Answered all his questions.  It was 

noted the patient still was mildly hypotensive on recheck.  Heart rate was in 

60s.  He was given normal saline 500 mL IV.





I discussed the case with Dr. Iqbal.  The patient will be started on 

Levaquin 750 mg IV for his noted effusion. 


Differential Diagnosis: 





My differential includes but is not limited to encephalopathy, electrolyte 

abnormality, sugar abnormality, dehydration, bacteremia, sepsis, CVA





- Data Points


Laboratory Results: 


 Laboratory Results





 01/26/18 14:57 





 01/26/18 14:57 





 











  01/26/18 01/26/18 01/26/18





  15:53 15:53 15:53


 


WBC      





    


 


RBC      





    


 


Hgb      





    


 


Hct      





    


 


MCV      





    


 


MCH      





    


 


MCHC      





    


 


RDW      





    


 


Plt Count      





    


 


MPV      





    


 


Neut % (Auto)      





    


 


Lymph % (Auto)      





    


 


Mono % (Auto)      





    


 


Eos % (Auto)      





    


 


Baso % (Auto)      





    


 


Nucleat RBC Rel Count      





    


 


Absolute Neuts (auto)      





    


 


Absolute Lymphs (auto)      





    


 


Absolute Monos (auto)      





    


 


Absolute Eos (auto)      





    


 


Absolute Basos (auto)      





    


 


Absolute Nucleated RBC      





    


 


Immature Gran %      





    


 


Immature Gran #      





    


 


PT      





    


 


INR      





    


 


APTT      





    


 


VBG Lactic Acid      1.0 mmol/L mmol/L





     (0.7-2.1) 


 


Sodium      





    


 


Potassium      





    


 


Chloride      





    


 


Carbon Dioxide      





    


 


Anion Gap      





    


 


BUN      





    


 


Creatinine      





    


 


Estimated GFR      





    


 


Glucose      





    


 


Calcium      





    


 


Total Bilirubin      





    


 


Conjugated Bilirubin      





    


 


Unconjugated Bilirubin      





    


 


AST      





    


 


ALT      





    


 


Alkaline Phosphatase      





    


 


Ammonia    30.0 uMOL/L uMOL/L  





    (9.0-30.0)  


 


Total Protein      





    


 


Albumin      





    


 


Lipase      





    


 


Procalcitonin  Pending     





    














  01/26/18 01/26/18 01/26/18





  14:57 14:57 14:57


 


WBC      7.71 10^3/uL 10^3/uL





     (3.80-9.50) 


 


RBC      3.64 10^6/uL L 10^6/uL





     (4.40-6.38) 


 


Hgb      11.1 g/dL L g/dL





     (13.7-17.5) 


 


Hct      33.0 % L %





     (40.0-51.0) 


 


MCV      90.7 fL fL





     (81.5-99.8) 


 


MCH      30.5 pg pg





     (27.9-34.1) 


 


MCHC      33.6 g/dL g/dL





     (32.4-36.7) 


 


RDW      15.6 % H %





     (11.5-15.2) 


 


Plt Count      202 10^3/uL 10^3/uL





     (150-400) 


 


MPV      10.7 fL fL





     (8.7-11.7) 


 


Neut % (Auto)      78.0 % H %





     (39.3-74.2) 


 


Lymph % (Auto)      10.9 % L %





     (15.0-45.0) 


 


Mono % (Auto)      10.0 % %





     (4.5-13.0) 


 


Eos % (Auto)      0.4 % L %





     (0.6-7.6) 


 


Baso % (Auto)      0.4 % %





     (0.3-1.7) 


 


Nucleat RBC Rel Count      0.0 % %





     (0.0-0.2) 


 


Absolute Neuts (auto)      6.02 10^3/uL 10^3/uL





     (1.70-6.50) 


 


Absolute Lymphs (auto)      0.84 10^3/uL L 10^3/uL





     (1.00-3.00) 


 


Absolute Monos (auto)      0.77 10^3/uL 10^3/uL





     (0.30-0.80) 


 


Absolute Eos (auto)      0.03 10^3/uL 10^3/uL





     (0.03-0.40) 


 


Absolute Basos (auto)      0.03 10^3/uL 10^3/uL





     (0.02-0.10) 


 


Absolute Nucleated RBC      0.00 10^3/uL 10^3/uL





     (0-0.01) 


 


Immature Gran %      0.3 % %





     (0.0-1.1) 


 


Immature Gran #      0.02 10^3/uL 10^3/uL





     (0.00-0.10) 


 


PT    15.2 SEC H SEC  





    (12.0-15.0)  


 


INR    1.18  H   





    (0.83-1.16)  


 


APTT    38.4 SEC H SEC  





    (23.0-38.0)  


 


VBG Lactic Acid      





    


 


Sodium  135 mEq/L mEq/L    





   (135-145)   


 


Potassium  4.1 mEq/L mEq/L    





   (3.5-5.2)   


 


Chloride  97 mEq/L mEq/L    





   ()   


 


Carbon Dioxide  23 mEq/l mEq/l    





   (22-31)   


 


Anion Gap  15 mEq/L mEq/L    





   (8-16)   


 


BUN  51 mg/dL H mg/dL    





   (7-23)   


 


Creatinine  1.9 mg/dL H mg/dL    





   (0.7-1.3)   


 


Estimated GFR  36     





    


 


Glucose  124 mg/dL H mg/dL    





   ()   


 


Calcium  10.2 mg/dL mg/dL    





   (8.5-10.4)   


 


Total Bilirubin  1.9 mg/dL H mg/dL    





   (0.1-1.4)   


 


Conjugated Bilirubin  0.9 mg/dL H mg/dL    





   (0.0-0.5)   


 


Unconjugated Bilirubin  1.0 mg/dL mg/dL    





   (0.0-1.1)   


 


AST  72 IU/L H IU/L    





   (17-59)   


 


ALT  54 IU/L IU/L    





   (21-72)   


 


Alkaline Phosphatase  76 IU/L IU/L    





   ()   


 


Ammonia      





    


 


Total Protein  8.0 g/dL g/dL    





   (6.3-8.2)   


 


Albumin  4.0 g/dL g/dL    





   (3.5-5.0)   


 


Lipase  182 IU/L IU/L    





   ()   


 


Procalcitonin      





    











Medications Given: 


 








Discontinued Medications





Sodium Chloride (Ns)  500 mls @ 0 mls/hr IV EDNOW ONE; Wide Open


   PRN Reason: Protocol


   Stop: 01/26/18 15:36


   Last Admin: 01/26/18 15:37 Dose:  500 mls








Departure





- Departure


Disposition: [facility] Inpatient Acute


Clinical Impression: 


 Pleural effusion





Altered mental status


Qualifiers:


 Altered mental status type: disorientation Qualified Code(s): R41.0 - 

Disorientation, unspecified





Hypotension


Qualifiers:


 Hypotension type: unspecified hypotension type Qualified Code(s): I95.9 - 

Hypotension, unspecified





Condition: Good

## 2018-01-26 NOTE — PDGENHP
History and Physical





- Chief Complaint


AMS





- History of Present Illness


 The patient is a 57-year-old male with the history of end-stage cirrhosis and 

history of encephalopathy who presents to the emergency department with altered 

mental status.  The patient is currently staying in the [facility]. 

He was found on the floor with altered mental status.  He was covered in feces. 

There is no reported fever. He is acutely encephalopathic and can follow 

commands but is not answering questions. This is a acute change from when he 

first presented to the E.D. He has received 500ml of fluid. BP has dropped and 

is in the 70's. He has not received abx yet.





He is hypoxic and is needing 4 L O2. He has not had a fever in the E.D. A CXR 

is c/w likely left sided pneumonia. He does not have leukocytosis. Lactic acid 

was unremarkable. He does not withdraw to abdominal palpation. His abd is not 

distended. A UA is pending.





ROS: unable to obtain.





- Past Medical History


hypertension


Additional medical history: Alcoholism.  Hepatitis-C virus.  Cirrhosis.  h/o 

Necrotizing fasciitis right lower extremity.  Depression with previous 

hospitalizations in behavioral health





- Surgical History


Reports: no pertinent surgical hx





- Family History


Additional family history: Mother with Alzheimer's dementia and major 

depressive disorder





- Social History


Smoking Status: Never smoked


Alcohol Use: hx of heavy drinking, none reported at this time


Drug Use: None





History Information





- Allergies/Home Medication List


Allergies/Adverse Reactions: 








No Known Allergies Allergy (Verified 01/26/18 17:35)


 





Home Medications: 








Furosemide [Lasix 40 MG (*)] 40 mg PO TID 12/04/17 [Last Taken Unknown]


Gabapentin [Neurontin 300 MG (*)] 1,200 mg PO TID 12/04/17 [Last Taken Unknown]


LORazepam [Ativan (*)] 1 mg PO TID 12/04/17 [Last Taken Unknown]


Omeprazole 20 mg PO DAILY 12/04/17 [Last Taken Unknown]


Propranolol HCl [Inderal 20mg (*)] 20 mg PO BID 12/04/17 [Last Taken Unknown]


Sertraline HCl [Zoloft 50mg (*)] 50 mg PO DAILY 12/04/17 [Last Taken Unknown]


Spironolactone 100 mg PO BID 12/04/17 [Last Taken Unknown]


Lactulose [Constulose] 20 gm PO TID 01/26/18 [Last Taken Unknown]


oxyCODONE IR [Oxycodone Ir (*)] 5 - 10 mg PO Q4H PRN 01/26/18 [Last Taken 

Unknown]





I have personally reviewed and updated: medical history, social history





- Past Medical History


hypertension


Additional medical history: Alcoholism.  Hepatitis-C virus.  Cirrhosis.  h/o 

Necrotizing fasciitis right lower extremity.  Depression with previous 

hospitalizations in behavioral health





- Surgical History


Reports: no pertinent surgical hx





- Family History


Additional family history: Mother with Alzheimer's dementia and major 

depressive disorder





- Social History


Smoking Status: Never smoked


Additional social history: lives in transitional housing [facility]





Review of Systems


Review of Systems: 








Physical Exam


Physical Exam: 

















Temp Pulse Resp BP Pulse Ox


 


 36.5 C   64   14   88/52 L  96 


 


 01/26/18 14:56  01/26/18 17:55  01/26/18 17:55  01/26/18 17:55  01/26/18 17:55




















O2 (L/minute)                  4














Constitutional: chronically ill appearing


Eyes: PERRL, EOMI


Ears, Nose, Mouth, Throat: dry mucous membranes


Cardiovascular: regular rate and rhythym, No JVD, No edema


Respiratory: other (left crackles, normal work of breathing)


Gastrointestinal: normoactive bowel sounds, soft, non-tender abdomen, No rebound

, No distension


Skin: warm


Neurologic: No AAOx3


Psychiatric: encephalopathic, No interacting appropriately





Lab Data & Imaging Review





 01/26/18 14:57





 01/26/18 14:57














WBC  7.71 10^3/uL (3.80-9.50)   01/26/18  14:57    


 


RBC  3.64 10^6/uL (4.40-6.38)  L  01/26/18  14:57    


 


Hgb  11.1 g/dL (13.7-17.5)  L  01/26/18  14:57    


 


Hct  33.0 % (40.0-51.0)  L  01/26/18  14:57    


 


MCV  90.7 fL (81.5-99.8)   01/26/18  14:57    


 


MCH  30.5 pg (27.9-34.1)   01/26/18  14:57    


 


MCHC  33.6 g/dL (32.4-36.7)   01/26/18  14:57    


 


RDW  15.6 % (11.5-15.2)  H  01/26/18  14:57    


 


Plt Count  202 10^3/uL (150-400)   01/26/18  14:57    


 


MPV  10.7 fL (8.7-11.7)   01/26/18  14:57    


 


Neut % (Auto)  78.0 % (39.3-74.2)  H  01/26/18  14:57    


 


Lymph % (Auto)  10.9 % (15.0-45.0)  L  01/26/18  14:57    


 


Mono % (Auto)  10.0 % (4.5-13.0)   01/26/18  14:57    


 


Eos % (Auto)  0.4 % (0.6-7.6)  L  01/26/18  14:57    


 


Baso % (Auto)  0.4 % (0.3-1.7)   01/26/18  14:57    


 


Nucleat RBC Rel Count  0.0 % (0.0-0.2)   01/26/18  14:57    


 


Absolute Neuts (auto)  6.02 10^3/uL (1.70-6.50)   01/26/18  14:57    


 


Absolute Lymphs (auto)  0.84 10^3/uL (1.00-3.00)  L  01/26/18  14:57    


 


Absolute Monos (auto)  0.77 10^3/uL (0.30-0.80)   01/26/18  14:57    


 


Absolute Eos (auto)  0.03 10^3/uL (0.03-0.40)   01/26/18  14:57    


 


Absolute Basos (auto)  0.03 10^3/uL (0.02-0.10)   01/26/18  14:57    


 


Absolute Nucleated RBC  0.00 10^3/uL (0-0.01)   01/26/18  14:57    


 


Immature Gran %  0.3 % (0.0-1.1)   01/26/18  14:57    


 


Immature Gran #  0.02 10^3/uL (0.00-0.10)   01/26/18  14:57    


 


PT  15.2 SEC (12.0-15.0)  H  01/26/18  14:57    


 


INR  1.18  (0.83-1.16)  H  01/26/18  14:57    


 


APTT  38.4 SEC (23.0-38.0)  H  01/26/18  14:57    


 


VBG Lactic Acid  1.0 mmol/L (0.7-2.1)   01/26/18  15:53    


 


Sodium  135 mEq/L (135-145)   01/26/18  14:57    


 


Potassium  4.1 mEq/L (3.5-5.2)   01/26/18  14:57    


 


Chloride  97 mEq/L ()   01/26/18  14:57    


 


Carbon Dioxide  23 mEq/l (22-31)   01/26/18  14:57    


 


Anion Gap  15 mEq/L (8-16)   01/26/18  14:57    


 


BUN  51 mg/dL (7-23)  H  01/26/18  14:57    


 


Creatinine  1.9 mg/dL (0.7-1.3)  H  01/26/18  14:57    


 


Estimated GFR  36   01/26/18  14:57    


 


Glucose  124 mg/dL ()  H  01/26/18  14:57    


 


Calcium  10.2 mg/dL (8.5-10.4)   01/26/18  14:57    


 


Total Bilirubin  1.9 mg/dL (0.1-1.4)  H  01/26/18  14:57    


 


Conjugated Bilirubin  0.9 mg/dL (0.0-0.5)  H  01/26/18  14:57    


 


Unconjugated Bilirubin  1.0 mg/dL (0.0-1.1)   01/26/18  14:57    


 


AST  72 IU/L (17-59)  H  01/26/18  14:57    


 


ALT  54 IU/L (21-72)   01/26/18  14:57    


 


Alkaline Phosphatase  76 IU/L ()   01/26/18  14:57    


 


Ammonia  30.0 uMOL/L (9.0-30.0)   01/26/18  15:53    


 


Total Protein  8.0 g/dL (6.3-8.2)   01/26/18  14:57    


 


Albumin  4.0 g/dL (3.5-5.0)   01/26/18  14:57    


 


Lipase  182 IU/L ()   01/26/18  14:57    


 


Procalcitonin  0.16 ng/mL (0.02-0.10)  H  01/26/18  15:53    











Assessment & Plan


Assessment: 





#Query Sepsis, source likely Pneumonia


#community acquired pneumonia, left sided


#Acute Respiratory Failure


#Hypotension


#Acute renal failure


#Acute Encephalopathy in a pt with hx of hepatic encephalopathy, Ammonia is not 

elevated


#Advance liver disease and cirrhosis due to chronic ETOH and Hep C


#Anemia of chronic disease


#Recent C-Diff, no reported diarrhea, no longer on meds





Plan:


this is a critically ill patient with severe hypotension. Looks very dry. I 

have asked the E.D. to provide a liter of NS stat and we will determine 

response to fluids. He will also be given Levaquin x 1 in the E.D. Lack of 

leukocytosis and normal lactic acid is noted. I will also order a 

procalcitonin. He does have a hx of MRSA Bacteremia for which he was treated 

with Vancomycin. He was hospitalized for this in December and the source was 

though to be cutaneous. I do not see open wounds. For now, I will hold off on 

starting Vancomycin until results of PCT are back.





Looks like he previously met with the palliative care team and was not ready to 

transition to hospice at that time. He still wanted active disease mgmt. 





His encephalopathy is likely multifactorial





Hold home diuretics and propranolol. Hold CNS depressing meds





DNR status per report





Lovenox for DVT proph





Total critical care time is 80 mins

## 2018-01-26 NOTE — CPEKG
Heart Rate: 69

RR Interval: 870

P-R Interval: 160

QRSD Interval: 140

QT Interval: 492

QTC Interval: 527

P Axis: 71

QRS Axis: 45

T Wave Axis: 37

EKG Severity - ABNORMAL ECG -

EKG Impression: SINUS RHYTHM

EKG Impression: RIGHT BUNDLE BRANCH BLOCK

Electronically Signed By: Alexander Burton 28-Jan-2018 09:38:53

## 2018-01-26 NOTE — PDMN
Medical Necessity


Medical necessity: C/M review:  est. > 2 MN LOS for eval and TX of acute  - 

left sided community acquired pneumonia, possible sepsis, respiratory failure, 

hypotension, renal failure, encephalopathy requiring ongoing IV Levaquin, IV 

fluids, pulse oximetry, supplemental O2, comorbid history of hepatic 

encephalopathy, advanced liver disease and cirrhosis due to chronic alcoholism 

and hepatitis C, anemia of chronic disease, recent C. Difficile, 12/2017 

hospitalization for MRSA bacteremia, necrotizing fasciitis right lower extremity

, depression with previous hospitalizations in behavioral health per H/P.

## 2018-01-27 LAB
INR PPP: 1.29 (ref 0.83–1.16)
PLATELET # BLD: 165 10^3/UL (ref 150–400)
PROTHROMBIN TIME: 16.3 SEC (ref 12–15)

## 2018-01-27 RX ADMIN — VANCOMYCIN HYDROCHLORIDE SCH MG: KIT at 22:03

## 2018-01-27 RX ADMIN — ENOXAPARIN SODIUM SCH MG: 100 INJECTION SUBCUTANEOUS at 09:47

## 2018-01-27 RX ADMIN — ONDANSETRON PRN MG: 2 SOLUTION INTRAMUSCULAR; INTRAVENOUS at 13:54

## 2018-01-27 RX ADMIN — LACTULOSE SCH GM: 20 SOLUTION ORAL at 16:28

## 2018-01-27 RX ADMIN — VANCOMYCIN HYDROCHLORIDE SCH MG: KIT at 16:29

## 2018-01-27 RX ADMIN — PANTOPRAZOLE SODIUM SCH MG: 40 TABLET, DELAYED RELEASE ORAL at 09:47

## 2018-01-27 RX ADMIN — OXYCODONE HYDROCHLORIDE PRN MG: 15 TABLET ORAL at 18:32

## 2018-01-27 RX ADMIN — VANCOMYCIN HYDROCHLORIDE SCH MLS: 1 INJECTION, POWDER, LYOPHILIZED, FOR SOLUTION INTRAVENOUS at 09:47

## 2018-01-27 RX ADMIN — ONDANSETRON PRN MG: 2 SOLUTION INTRAMUSCULAR; INTRAVENOUS at 18:32

## 2018-01-27 RX ADMIN — LACTULOSE SCH GM: 20 SOLUTION ORAL at 09:47

## 2018-01-27 RX ADMIN — OXYCODONE HYDROCHLORIDE PRN MG: 15 TABLET ORAL at 22:44

## 2018-01-27 RX ADMIN — OXYCODONE HYDROCHLORIDE PRN MG: 15 TABLET ORAL at 06:47

## 2018-01-27 RX ADMIN — OXYCODONE HYDROCHLORIDE PRN MG: 15 TABLET ORAL at 13:54

## 2018-01-27 RX ADMIN — LACTULOSE SCH GM: 20 SOLUTION ORAL at 22:03

## 2018-01-27 RX ADMIN — VANCOMYCIN HYDROCHLORIDE SCH MLS: 1 INJECTION, POWDER, LYOPHILIZED, FOR SOLUTION INTRAVENOUS at 22:03

## 2018-01-27 RX ADMIN — OXYCODONE HYDROCHLORIDE PRN MG: 15 TABLET ORAL at 02:21

## 2018-01-27 NOTE — PCMIDPN
Assessment/Plan: 


Assessment:


Altered mental status presentation in patient with prior MRSA infections.  No 

positive microbiologic data yet.  Left basilar consolidation may represent new 

pneumonia.  Patient is being managed on a combination of empiric vancomycin and 

Levaquin.  Await maturation of blood cultures.  Clinically the patient is much 

improved from admission.











Plan:


1. Continue vancomycin and Levaquin.


2. Follow clinical course.











01/27/18 17:09





01/27/18 17:09





Subjective: 





Patient is resting in his hospital room.  He is very pleasant.  His demeanor is 

at baseline.  His cognition is also at baseline.  Denies any fevers or chills.  

States he just needs to rest.


Objective: 


Vancomycin # 1


Levaquin # 2 Vital Signs











Temp Pulse Resp BP Pulse Ox


 


 36.9 C   82   16   113/60   93 


 


 01/27/18 07:40  01/27/18 07:40  01/27/18 07:40  01/27/18 07:40  01/27/18 07:40








 Laboratory Results





 01/27/18 06:00 





 01/27/18 06:00 





 











 01/26/18 01/27/18 01/28/18





 05:59 05:59 05:59


 


Intake Total  4370 


 


Output Total  900 


 


Balance  3470 














- Physical Exam


General Appearance: WD/WN, alert, no apparent distress, non-toxic


Respiratory: lungs clear, normal breath sounds, No respiratory distress


Cardiac/Chest: regular rate, rhythm, No tachycardia


Skin: normal color, warm/dry, No rash


Neuro/Psych: alert, normal mood/affect, oriented x 3





ICD10 Worksheet


Patient Problems: 


 Problems











Problem Status Onset


 


Altered mental status Acute  


 


Hypotension Acute  


 


Pleural effusion Acute  


 


Alcohol dependence Active  


 


Cellulitis Active  


 


Kidney disease Active  


 


Necrotizing fasciitis Active  


 


Acute renal failure Acute  


 


C. difficile diarrhea Acute  ~05/12/17


 


Dyspnea Acute  


 


Edema extremities Acute  


 


HTN (hypertension) Acute  


 


Hepatorenal failure Acute  


 


Hyperammonemia Acute  


 


Hyperbilirubinemia Acute  


 


Hyperkalemia Acute  


 


Liver cirrhosis Acute  


 


MRSA (methicillin resistant staph aureus) culture positive Acute  07/21/14


 


Palliative care encounter Acute  


 


Pneumonia Acute  


 


Squamous cell cancer of skin of right shoulder Acute  


 


Squamous cell carcinoma Acute  


 


Weakness Acute  


 


Alcoholism Chronic  


 


Gait instability Chronic  


 


Hepatitis C Chronic  


 


Low back pain Chronic

## 2018-01-27 NOTE — GCON
[f rep st]



                                                                    CONSULTATION





PULMONARY CONSULTATION



HISTORY OF PRESENT ILLNESS:  This patient is a 57-year-old male with a known history of advanced cirr
hosis and multiple hospital admissions for hepatic encephalopathy and chronic narcotic dependence.  YARITZA hoffman had a recent admission with similar problems and was discharged in good condition.  This occurred b
etween 12/04 and 12/17.  At that time, he had MRSA bacteremia, presumably from skin infections and C 
diff, but these were all treated appropriately and he did well.  In any case[facility] was found lying on the floor unresponsive, covered in feces.  He said that
 he had fallen asleep and was having difficulty getting to the bathroom because of his lactulose and 
had frequently had stool all over himself.  He was found to have a blood pressure of 89/54 and 95% ox
ygen saturation on 4 L.  His workup included a chest x-ray that showed consolidation in the left base
.  His white count was 9.7, and his procalcitonin was 0.16.  He was treated with IV fluids and antibi
otics and transferred into the step-down unit.



REVIEW OF SYSTEMS:  Otherwise negative.



PAST MEDICAL HISTORY:  Includes: 

1.  What is billed as end-stage liver disease.

2.  Episodes of hepatic encephalopathy.

3.  MRSA bacteremia.

4.  C diff.

5.  Hepatitis C.

6.  Alcohol which he maintains he has been abstinent from for the last 6 months.

7.  Narcotic dependence due to vague chronic pain.

8.  Anemia.

9.  Low back pain.

10.  Hyperlipidemia.

11.  History of necrotizing fasciitis.

12.  Squamous cell carcinoma of the skin.



PAST SURGICAL HISTORY:  Includes only wound debridements.



SOCIAL HISTORY:  His smoking history is unclear to me at this time, but apparently is not an IV drug 
user but does have an alcohol history.



FAMILY HISTORY:  Noncontributory.



MEDICATIONS:  Include Lovenox, Levaquin, Zofran, oxycodone, Protonix, vancomycin.



PHYSICAL EXAM:  VITAL SIGNS:  His blood pressure is 104/61, heart rate of 80, afebrile, respiratory r
ate 19, oxygen saturation 97% on 2 L and 93% on room air.  GENERAL:  He was awake and alert, in no ap
parent distress and able to speak in full sentences.  Not using accessory muscles for breathing.  DOROTEO
NT:  Mucous membranes are moist, without erythema or exudate.  NECK:  Supple, without adenopathy or j
ugular vein distention.  RESPIRATORY:  Breath sounds were clear to auscultation bilaterally without w
heezes, rubs, rales.  HEART:  A regular rate and rhythm, without murmurs, rubs, gallops.  ABDOMEN:  S
oft, nontender, nondistended, without obvious ascites and normoactive bowel tones.  EXTREMITIES:  Old
 wounds that were well healed.  No obvious cellulitis.  NEUROLOGIC:  Nonfocal and non encephalopathic
.  SKIN:  Warm and dry, without rash.



OBJECTIVE DATA:  Includes a white count of 7.7 when he arrived, 9.7 today, hematocrit 30, platelets o
f 165.  INR 1.29.  Basic metabolic panel fairly unremarkable save for bicarb 19, anion gap of 12, tot
al bilirubin of 1.8.  AST, ALT are normal.  Albumin is 3.2 after hydration.  Procalcitonin 0.16.  Uri
nalysis unremarkable.  



Chest x-ray:  As described above.



ASSESSMENT AND PLAN:  

1.  Pneumonia.  I think the likelihood of bacterial pneumonia is low with a procalcitonin of 0.16, th
ough treating him with antibiotics is of very low risk.  This may be just due to simple atelectasis. 
 Should he continue to have problems, we may consider a CT scan for further evaluation.

2.  Hypotension.  He is 3400 mL positive since arrival and his blood pressure is normal now.  In adva
nced liver disease, I think a systolic pressure of 90 is acceptable and likely is probably around edinson
t level chronically.

3.  Acute kidney injury with his creatinine 1.9, is down to 1.3 today after fluid resuscitation, whic
h is appropriate.

4.  History of methicillin-resistant staphylococcus aureus.  Vancomycin is reasonable.  Blood culture
s have been drawn. 

5.  End-stage liver disease.  His Model End-Stage Liver Disease score is 17 which puts him in a vague
 category for whether or not transjugular intrahepatic portosystemic shunt would be useful.  I would 
err on the side of not transjugular intrahepatic portosystemic shunt at this point.





Job #:  [id number]

## 2018-01-27 NOTE — ASMTCMCOM
CM Note

 

CM Note                       

Notes:

Pt admitted with AMS, pleural effusion, hypotension. Hx MRSA in past. currently on vanco and 

levaquin. CM will follow for any d/c neds.

 

Date Signed:  01/27/2018 05:18 PM

Electronically Signed By:IVAN Banerjee

## 2018-01-27 NOTE — HOSPPROG
Hospitalist Progress Note


Assessment/Plan: 





# hypotension - unclear if sepsis or hypovolemia; responded to IVF, currently 

at baseline


   - follow closely


# L base pna - cont vanc (hx MRSA) and levaquin


   - dc vanc soon


# hx c. dif - vanc ppx dose while on abx


# encephalopathy - narcotics, liver failure, possible infection


   - seems at baseline currently


# ROBYN - resolved


# cirrhosis d/t HCV, etOH


   - cont lactulose


   - hold lasix, aldactone, propranolol = restart soon


   - consider HCV treatment as outpatient


   - needs hepatology f/u


# chronic pain with continuous narcotic dependency


# anemia of chronic inflammation





Subjective: asking for pain meds


Objective: 


 Vital Signs











Temp Pulse Resp BP Pulse Ox


 


 36.9 C   82   16   113/60   93 


 


 01/27/18 07:40  01/27/18 07:40  01/27/18 07:40  01/27/18 07:40  01/27/18 07:40








 Laboratory Results





 01/27/18 06:00 





 01/27/18 06:00 





 











 01/26/18 01/27/18 01/28/18





 05:59 05:59 05:59


 


Intake Total  4370 


 


Output Total  900 


 


Balance  3470 








 











PT  16.3 SEC (12.0-15.0)  H  01/27/18  06:00    


 


INR  1.29  (0.83-1.16)  H  01/27/18  06:00    








high risk with underlying liver failure and hypotension





- Physical Exam


Constitutional: no apparent distress, appears nourished


Eyes: anicteric sclera


Cardiovascular: regular rate and rhythym, systolic murmur, No irregularly 

irregular


Respiratory: no respiratory distress, no rales or rhonchi


Gastrointestinal: soft, non-tender abdomen, no palpable masses


Skin: No rash





ICD10 Worksheet


Patient Problems: 


 Problems











Problem Status Onset


 


Dyspnea Acute  


 


Liver cirrhosis Acute  


 


Hepatorenal failure Acute  


 


Hyperammonemia Acute  


 


Palliative care encounter Acute  


 


Altered mental status Acute  


 


Pleural effusion Acute  


 


Hypotension Acute  


 


C. difficile diarrhea Acute  ~05/12/17


 


Alcohol dependence Active  


 


Kidney disease Active  


 


Cellulitis Active  


 


Necrotizing fasciitis Active  


 


Acute renal failure Acute  


 


Squamous cell carcinoma Acute  


 


Alcoholism Chronic  


 


Gait instability Chronic  


 


Squamous cell cancer of skin of right shoulder Acute  


 


Hyperkalemia Acute  


 


Low back pain Chronic  


 


Hepatitis C Chronic  


 


Edema extremities Acute  


 


HTN (hypertension) Acute  


 


MRSA (methicillin resistant staph aureus) culture positive Acute  07/21/14


 


Pneumonia Acute  


 


Hyperbilirubinemia Acute  


 


Weakness Acute

## 2018-01-28 LAB — PLATELET # BLD: 145 10^3/UL (ref 150–400)

## 2018-01-28 RX ADMIN — FUROSEMIDE SCH MG: 40 TABLET ORAL at 10:07

## 2018-01-28 RX ADMIN — OXYCODONE HYDROCHLORIDE PRN MG: 15 TABLET ORAL at 02:47

## 2018-01-28 RX ADMIN — VANCOMYCIN HYDROCHLORIDE SCH MG: KIT at 20:32

## 2018-01-28 RX ADMIN — VANCOMYCIN HYDROCHLORIDE SCH MG: KIT at 09:42

## 2018-01-28 RX ADMIN — OXYCODONE HYDROCHLORIDE PRN MG: 15 TABLET ORAL at 14:03

## 2018-01-28 RX ADMIN — LACTULOSE SCH GM: 20 SOLUTION ORAL at 20:32

## 2018-01-28 RX ADMIN — OXYCODONE HYDROCHLORIDE PRN MG: 15 TABLET ORAL at 09:43

## 2018-01-28 RX ADMIN — OXYCODONE HYDROCHLORIDE PRN MG: 15 TABLET ORAL at 20:31

## 2018-01-28 RX ADMIN — SPIRONOLACTONE SCH MG: 50 TABLET, FILM COATED ORAL at 10:07

## 2018-01-28 RX ADMIN — SODIUM CHLORIDE SCH MLS: 900 INJECTION, SOLUTION INTRAVENOUS at 02:22

## 2018-01-28 RX ADMIN — LACTULOSE SCH GM: 20 SOLUTION ORAL at 09:42

## 2018-01-28 RX ADMIN — ONDANSETRON PRN MG: 2 SOLUTION INTRAMUSCULAR; INTRAVENOUS at 02:21

## 2018-01-28 RX ADMIN — PANTOPRAZOLE SODIUM SCH MG: 40 TABLET, DELAYED RELEASE ORAL at 09:42

## 2018-01-28 RX ADMIN — ENOXAPARIN SODIUM SCH MG: 100 INJECTION SUBCUTANEOUS at 09:42

## 2018-01-28 RX ADMIN — LACTULOSE SCH GM: 20 SOLUTION ORAL at 16:23

## 2018-01-28 RX ADMIN — ONDANSETRON PRN MG: 4 TABLET, ORALLY DISINTEGRATING ORAL at 14:04

## 2018-01-28 RX ADMIN — VANCOMYCIN HYDROCHLORIDE SCH MLS: 1 INJECTION, POWDER, LYOPHILIZED, FOR SOLUTION INTRAVENOUS at 10:07

## 2018-01-28 NOTE — HOSPPROG
Hospitalist Progress Note


Assessment/Plan: 








57M with liver failure.  Discharged 12/13 after similar presentation to SNF, 

was dc'd home 3 days before representing with pneumonia, loss of bowel.





# hypotension - resolved; stop NS


# L base pna - cont vanc (hx MRSA) and levaquin


   - cultures NGTD - will dc vanc soon


# hx c. dif - vanc ppx dose while on abx


# encephalopathy - narcotics, liver failure, possible infection


   - seems at baseline currently


# ROBYN - resolved


# cirrhosis d/t HCV, etOH


   - cont lactulose


   - restart lasix and aldactone today at lower doses


   - hold propranolol, start possibly tomorrow


   - needs hepatology f/u


# chronic pain with continuous narcotic dependency


# anemia of chronic inflammation





# dispo - med surg





Subjective: feels better than when whe was admitted


Objective: 


 Vital Signs











Temp Pulse Resp BP Pulse Ox


 


 36.4 C   90   16   113/67   95 


 


 01/28/18 08:00  01/28/18 08:00  01/28/18 08:00  01/28/18 08:00  01/28/18 08:00








 Laboratory Results





 01/28/18 04:00 





 01/28/18 04:00 





 











 01/27/18 01/28/18 01/29/18





 05:59 05:59 05:59


 


Intake Total 4370 2350 


 


Output Total 900 1800 400


 


Balance 3470 550 -400








 











PT  16.3 SEC (12.0-15.0)  H  01/27/18  06:00    


 


INR  1.29  (0.83-1.16)  H  01/27/18  06:00    








mod risk





- Physical Exam


Constitutional: unkempt


Cardiovascular: regular rate and rhythym, no murmur, rub, or gallop


Respiratory: no respiratory distress, no rales or rhonchi, clear to auscultation


Gastrointestinal: soft, non-tender abdomen, no palpable masses





ICD10 Worksheet


Patient Problems: 


 Problems











Problem Status Onset


 


Dyspnea Acute  


 


Liver cirrhosis Acute  


 


Hepatorenal failure Acute  


 


Hyperammonemia Acute  


 


Palliative care encounter Acute  


 


Altered mental status Acute  


 


Pleural effusion Acute  


 


Hypotension Acute  


 


C. difficile diarrhea Acute  ~05/12/17


 


Alcohol dependence Active  


 


Kidney disease Active  


 


Cellulitis Active  


 


Necrotizing fasciitis Active  


 


Acute renal failure Acute  


 


Squamous cell carcinoma Acute  


 


Alcoholism Chronic  


 


Gait instability Chronic  


 


Squamous cell cancer of skin of right shoulder Acute  


 


Hyperkalemia Acute  


 


Low back pain Chronic  


 


Hepatitis C Chronic  


 


Edema extremities Acute  


 


HTN (hypertension) Acute  


 


MRSA (methicillin resistant staph aureus) culture positive Acute  07/21/14


 


Pneumonia Acute  


 


Hyperbilirubinemia Acute  


 


Weakness Acute

## 2018-01-29 LAB — PLATELET # BLD: 134 10^3/UL (ref 150–400)

## 2018-01-29 RX ADMIN — ACETAMINOPHEN PRN MG: 325 TABLET ORAL at 16:52

## 2018-01-29 RX ADMIN — ENOXAPARIN SODIUM SCH MG: 100 INJECTION SUBCUTANEOUS at 08:15

## 2018-01-29 RX ADMIN — OXYCODONE HYDROCHLORIDE PRN MG: 15 TABLET ORAL at 04:19

## 2018-01-29 RX ADMIN — VANCOMYCIN HYDROCHLORIDE SCH MG: KIT at 19:56

## 2018-01-29 RX ADMIN — OXYCODONE HYDROCHLORIDE PRN MG: 15 TABLET ORAL at 08:15

## 2018-01-29 RX ADMIN — ONDANSETRON PRN MG: 4 TABLET, ORALLY DISINTEGRATING ORAL at 15:23

## 2018-01-29 RX ADMIN — PANTOPRAZOLE SODIUM SCH MG: 40 TABLET, DELAYED RELEASE ORAL at 08:45

## 2018-01-29 RX ADMIN — OXYCODONE HYDROCHLORIDE PRN MG: 15 TABLET ORAL at 19:56

## 2018-01-29 RX ADMIN — ONDANSETRON PRN MG: 4 TABLET, ORALLY DISINTEGRATING ORAL at 08:14

## 2018-01-29 RX ADMIN — OXYCODONE HYDROCHLORIDE PRN MG: 15 TABLET ORAL at 15:23

## 2018-01-29 RX ADMIN — VANCOMYCIN HYDROCHLORIDE SCH MG: KIT at 08:15

## 2018-01-29 RX ADMIN — ONDANSETRON PRN MG: 4 TABLET, ORALLY DISINTEGRATING ORAL at 04:20

## 2018-01-29 RX ADMIN — FUROSEMIDE SCH MG: 40 TABLET ORAL at 08:14

## 2018-01-29 RX ADMIN — LACTULOSE SCH GM: 20 SOLUTION ORAL at 08:15

## 2018-01-29 RX ADMIN — LACTULOSE SCH GM: 20 SOLUTION ORAL at 16:48

## 2018-01-29 RX ADMIN — ONDANSETRON PRN MG: 4 TABLET, ORALLY DISINTEGRATING ORAL at 19:57

## 2018-01-29 RX ADMIN — SPIRONOLACTONE SCH MG: 50 TABLET, FILM COATED ORAL at 09:54

## 2018-01-29 RX ADMIN — LACTULOSE SCH GM: 20 SOLUTION ORAL at 21:30

## 2018-01-29 NOTE — HOSPPROG
Hospitalist Progress Note


Assessment/Plan: 








57M with liver failure.  Discharged 12/13 after similar presentation to SNF, 

was dc'd home 3 days before representing with pneumonia, loss of bowel. Today 

is my first encounter with the patient, chart reviewed. Reviewed his care with 

Dr Carlson and Dr Glynn who have been caring for Mauro.





# hypotension - resolved





# L base pna - 


   - cultures NGTD 


   - vanco dc


   - Levaquin for a total of 7 days


   -oxygen level stable on room air





# hx c. diff - vanc ppx dose while on abx





# encephalopathy - narcotics, liver failure, possible infection


   - seems at baseline currently





# ROBYN - resolved





# cirrhosis d/t HCV, etOH


   - cont lactulose


   - restarted Lasix and Aldactone at lower doses


   - hold propranolol, start possibly tomorrow


   - needs hepatology f/u





# chronic pain with continuous narcotic dependency


   Patient gets good relief with OxyContin


   Describes having pain from the shoulders down to his feet that is chronic





# anemia of chronic inflammation





# dispo - med surg





#plan-patient likely needs permanent placing at a care facility.  I suspect if 

Mauro returns to his apartment he will likely end up back in the emergency room.





Subjective: Patient is complaining of overall aches and pain that are chronic


Objective: 


 Vital Signs











Temp Pulse Resp BP Pulse Ox


 


 36.6 C   86   16   114/63   96 


 


 01/29/18 08:00  01/29/18 08:00  01/29/18 08:00  01/29/18 08:00  01/29/18 08:00








 Laboratory Results





 01/29/18 04:57 





 01/29/18 04:57 





 











 01/28/18 01/29/18 01/30/18





 05:59 05:59 05:59


 


Intake Total 2350 750 


 


Output Total 1800 400 


 


Balance 550 350 








 











PT  16.3 SEC (12.0-15.0)  H  01/27/18  06:00    


 


INR  1.29  (0.83-1.16)  H  01/27/18  06:00    














- Physical Exam


Constitutional: appears nourished, chronically ill appearing, uncomfortable, No 

not in pain


Eyes: PERRL


Ears, Nose, Mouth, Throat: hearing normal


Cardiovascular: no murmur, rub, or gallop


Respiratory: no respiratory distress


Gastrointestinal: normoactive bowel sounds, No ascites


Skin: warm, No normal color (pale)


Musculoskeletal: generalized weakness


Neurologic: AAOx3


Psychiatric: interacting appropriately, not encephalopathic





ICD10 Worksheet


Patient Problems: 


 Problems











Problem Status Onset


 


Altered mental status Acute  


 


Hypotension Acute  


 


Pleural effusion Acute  


 


Alcohol dependence Active  


 


Cellulitis Active  


 


Kidney disease Active  


 


Necrotizing fasciitis Active  


 


Acute renal failure Acute  


 


C. difficile diarrhea Acute  ~05/12/17


 


Dyspnea Acute  


 


Edema extremities Acute  


 


HTN (hypertension) Acute  


 


Hepatorenal failure Acute  


 


Hyperammonemia Acute  


 


Hyperbilirubinemia Acute  


 


Hyperkalemia Acute  


 


Liver cirrhosis Acute  


 


MRSA (methicillin resistant staph aureus) culture positive Acute  07/21/14


 


Palliative care encounter Acute  


 


Pneumonia Acute  


 


Squamous cell cancer of skin of right shoulder Acute  


 


Squamous cell carcinoma Acute  


 


Weakness Acute  


 


Alcoholism Chronic  


 


Gait instability Chronic  


 


Hepatitis C Chronic  


 


Low back pain Chronic

## 2018-01-29 NOTE — ASMTCMCOM
CM Note

 

CM Note                       

Notes:

CM completed the ULTC-100. CM faxed attached it to [employer]. CM spoke w/ Florecita Macias and she 

is recommending Medicaid LTC stay. Pt reports that he does not want to think about making any 

decisions about referrals to SNF today. CM notified [facility] that pt will need LTC. CM to follow.



Plan: SNF

 

Date Signed:  01/29/2018 04:01 PM

Electronically Signed By:CHAO Young

## 2018-01-29 NOTE — ASMTCMCOM
CM Note

 

CM Note                       

Notes:

CM met w/ pt and Yony, pts  for dispo planning. OT is recommending SNF. CM 

requested that an order is put in for PT. Pt is current w/ [facility] and [facility]. Updates 

sent to facilities. Yony reports that pt is unable to keep track of his medications. Yony reports 


that pt was taking meds from different days of the week on the wrong days. Pt reports that he is 

not interested in going back to [facility]. Pt reports that he will think about going to 

a SNF. Pt hopes to return back to his apartment. CM to follow.



Plan: TBD; awaiting PT recommendations 

 

Date Signed:  01/29/2018 12:05 PM

Electronically Signed By:CHAO Young

## 2018-01-30 LAB — PLATELET # BLD: 141 10^3/UL (ref 150–400)

## 2018-01-30 RX ADMIN — PROPRANOLOL HYDROCHLORIDE SCH MG: 20 TABLET ORAL at 21:25

## 2018-01-30 RX ADMIN — GABAPENTIN SCH MG: 300 CAPSULE ORAL at 21:25

## 2018-01-30 RX ADMIN — VANCOMYCIN HYDROCHLORIDE SCH MG: KIT at 09:13

## 2018-01-30 RX ADMIN — OXYCODONE HYDROCHLORIDE PRN MG: 15 TABLET ORAL at 08:10

## 2018-01-30 RX ADMIN — GABAPENTIN SCH MG: 300 CAPSULE ORAL at 16:25

## 2018-01-30 RX ADMIN — FUROSEMIDE SCH MG: 40 TABLET ORAL at 09:14

## 2018-01-30 RX ADMIN — ONDANSETRON PRN MG: 4 TABLET, ORALLY DISINTEGRATING ORAL at 08:11

## 2018-01-30 RX ADMIN — LACTULOSE SCH GM: 20 SOLUTION ORAL at 22:37

## 2018-01-30 RX ADMIN — ACETAMINOPHEN PRN MG: 325 TABLET ORAL at 12:27

## 2018-01-30 RX ADMIN — VANCOMYCIN HYDROCHLORIDE SCH MG: KIT at 21:25

## 2018-01-30 RX ADMIN — OXYCODONE HYDROCHLORIDE PRN MG: 15 TABLET ORAL at 18:08

## 2018-01-30 RX ADMIN — LACTULOSE SCH GM: 20 SOLUTION ORAL at 16:25

## 2018-01-30 RX ADMIN — LACTULOSE SCH GM: 20 SOLUTION ORAL at 09:12

## 2018-01-30 RX ADMIN — OXYCODONE HYDROCHLORIDE PRN MG: 15 TABLET ORAL at 13:16

## 2018-01-30 RX ADMIN — ENOXAPARIN SODIUM SCH MG: 100 INJECTION SUBCUTANEOUS at 09:11

## 2018-01-30 RX ADMIN — PANTOPRAZOLE SODIUM SCH MG: 40 TABLET, DELAYED RELEASE ORAL at 09:13

## 2018-01-30 RX ADMIN — SPIRONOLACTONE SCH MG: 50 TABLET, FILM COATED ORAL at 09:15

## 2018-01-30 RX ADMIN — ONDANSETRON PRN MG: 4 TABLET, ORALLY DISINTEGRATING ORAL at 13:16

## 2018-01-30 NOTE — HOSPPROG
Hospitalist Progress Note


Assessment/Plan: 








57M with liver failure.  Discharged 12/13 after similar presentation to SNF, 

was dc'd home 3 days before representing with pneumonia, loss of bowel. 





# hypotension - resolved





# L base pna - 


   - cultures NGTD 


   - vanco dc


   - Levaquin for a total of 7 days. #4/7


   - oxygen level stable on room air





# hx c. diff - vanc ppx dose while on abx





# encephalopathy - narcotics, liver failure, possible infection


   - at baseline 





# ROBYN - resolved





# cirrhosis d/t HCV, etOH


   - cont lactulose


   - restarted Lasix and Aldactone at lower doses


   - resumed propranolol,  (parameters placed as when to hold)


   - needs hepatology f/u





# chronic pain with continuous narcotic dependency


   Patient gets good relief with OxyContin


   Describes having pain from the shoulders down to his feet that is chronic


   resume his gabapentin at lower dose





# anemia of chronic inflammation





# dispo - med surg





#plan-patient likely needs permanent placing at a care facility.  I suspect if 

Mauro returns to his apartment he will likely end up back in the emergency 

room. Reviewed his care w [facility] and the plan is [facility].  Mauro is amenable 

to this.  Bed hopefully available on Thursday. Recommending on dc to cont lower 

dose of gabapentin, he gets more confused with higher doses.  





Subjective: Mauro is c/o ongoing aches to his legs, otherwise, feeling well. 

Wants to take a shower.


Objective: 


 Vital Signs











Temp Pulse Resp BP Pulse Ox


 


 36.6 C   101 H  20   101/67   96 


 


 01/30/18 07:42  01/30/18 07:42  01/30/18 07:42  01/30/18 07:42  01/30/18 07:42








 Laboratory Results





 01/30/18 04:45 





 01/30/18 04:45 





 











 01/29/18 01/30/18 01/31/18





 05:59 05:59 05:59


 


Intake Total 750  


 


Output Total 400  


 


Balance 350  








 











PT  16.3 SEC (12.0-15.0)  H  01/27/18  06:00    


 


INR  1.29  (0.83-1.16)  H  01/27/18  06:00    














- Physical Exam


Constitutional: no apparent distress, chronically ill appearing


Eyes: PERRL


Ears, Nose, Mouth, Throat: hearing normal


Cardiovascular: regular rate and rhythym


Respiratory: no respiratory distress


Gastrointestinal: normoactive bowel sounds


Skin: warm


Musculoskeletal: generalized weakness


Neurologic: AAOx3


Psychiatric: interacting appropriately, not anxious, not encephalopathic, 

thought process linear





ICD10 Worksheet


Patient Problems: 


 Problems











Problem Status Onset


 


Altered mental status Acute  


 


Hypotension Acute  


 


Pleural effusion Acute  


 


Alcohol dependence Active  


 


Cellulitis Active  


 


Kidney disease Active  


 


Necrotizing fasciitis Active  


 


Acute renal failure Acute  


 


C. difficile diarrhea Acute  ~05/12/17


 


Dyspnea Acute  


 


Edema extremities Acute  


 


HTN (hypertension) Acute  


 


Hepatorenal failure Acute  


 


Hyperammonemia Acute  


 


Hyperbilirubinemia Acute  


 


Hyperkalemia Acute  


 


Liver cirrhosis Acute  


 


MRSA (methicillin resistant staph aureus) culture positive Acute  07/21/14


 


Palliative care encounter Acute  


 


Pneumonia Acute  


 


Squamous cell cancer of skin of right shoulder Acute  


 


Squamous cell carcinoma Acute  


 


Weakness Acute  


 


Alcoholism Chronic  


 


Gait instability Chronic  


 


Hepatitis C Chronic  


 


Low back pain Chronic

## 2018-01-30 NOTE — ASMTCMCOM
DEWAY[facility] Note

 

CM Note                       

Notes:

Samantha from [facility] called to get updated information on patient. He remains an open client 


with them.Spoke with patient who states he will go to [facility] if it's necessary to get his 

medications straightened out. He wants to return to his apartment but knows he may need additional 

support right now. He has not thought about the long term. [facility] will take the patient if 

he wants to return there. Patient does not want to return to [facility]. CM will follow. 

 

Date Signed:  01/30/2018 11:11 AM

Electronically Signed By:Rizwana Wade LCSW

## 2018-01-31 RX ADMIN — GABAPENTIN SCH MG: 300 CAPSULE ORAL at 08:05

## 2018-01-31 RX ADMIN — GABAPENTIN SCH MG: 300 CAPSULE ORAL at 15:50

## 2018-01-31 RX ADMIN — ENOXAPARIN SODIUM SCH MG: 100 INJECTION SUBCUTANEOUS at 08:05

## 2018-01-31 RX ADMIN — VANCOMYCIN HYDROCHLORIDE SCH MG: KIT at 08:05

## 2018-01-31 RX ADMIN — SPIRONOLACTONE SCH MG: 50 TABLET, FILM COATED ORAL at 08:05

## 2018-01-31 RX ADMIN — LACTULOSE SCH GM: 20 SOLUTION ORAL at 22:42

## 2018-01-31 RX ADMIN — OXYCODONE HYDROCHLORIDE PRN MG: 15 TABLET ORAL at 06:34

## 2018-01-31 RX ADMIN — SERTRALINE HYDROCHLORIDE SCH MG: 50 TABLET, FILM COATED ORAL at 08:05

## 2018-01-31 RX ADMIN — GABAPENTIN SCH MG: 300 CAPSULE ORAL at 22:42

## 2018-01-31 RX ADMIN — ACETAMINOPHEN PRN MG: 325 TABLET ORAL at 15:51

## 2018-01-31 RX ADMIN — PROPRANOLOL HYDROCHLORIDE SCH: 20 TABLET ORAL at 08:29

## 2018-01-31 RX ADMIN — LACTULOSE SCH GM: 20 SOLUTION ORAL at 08:06

## 2018-01-31 RX ADMIN — FUROSEMIDE SCH MG: 40 TABLET ORAL at 08:05

## 2018-01-31 RX ADMIN — PANTOPRAZOLE SODIUM SCH MG: 40 TABLET, DELAYED RELEASE ORAL at 08:05

## 2018-01-31 RX ADMIN — PROPRANOLOL HYDROCHLORIDE SCH MG: 20 TABLET ORAL at 20:02

## 2018-01-31 RX ADMIN — OXYCODONE HYDROCHLORIDE PRN MG: 15 TABLET ORAL at 12:53

## 2018-01-31 RX ADMIN — VANCOMYCIN HYDROCHLORIDE SCH MG: KIT at 20:02

## 2018-01-31 RX ADMIN — OXYCODONE HYDROCHLORIDE PRN MG: 15 TABLET ORAL at 20:02

## 2018-01-31 RX ADMIN — LACTULOSE SCH GM: 20 SOLUTION ORAL at 15:50

## 2018-01-31 NOTE — ASMTCMCOM
CM Note

 

CM Note                       

Notes:

Today per MD and RN, Pt. states he does NOT want to d/c to [facility].  Kendrick met w/ Pt. in 

room.   Sent new referrals via [employer].  Pt. states today he is most interested in [facility] 

and [facility].  



Princess [phone] from [facility] states Pt. is approved for Medicaid LTC stay.  Please call Princess to 

let her know where Pt. d'cs.



CM to follow for d/c POC.  

 

Date Signed:  01/31/2018 04:52 PM

Electronically Signed By:Stephanie Hinton LCSW

## 2018-01-31 NOTE — HOSPPROG
Hospitalist Progress Note


Assessment/Plan: 








The patient is a 57-year-old male with PMH liver cirrhosis who was admitted for 

pneumonia and hypotension.





ASSESSMENT/PLAN:


Pneumonia-community acquired


   -Levaquin day #5/7.


   -SVNs, O2 prn.


Hx C. diff


   -ppx po vanco since pt on Levaquin


Encephalopathy, improved


   -at baseline. 


   -better at lower gabapentin dose.


Liver cirrhosis 2/2 HCV, alcohol


   -lactulose


   -Lasix/Aldactone


   -propraolol


   -FU w/ liver specialist as outpt.


Anemia of chronic disease


   -stable.


Chronic pain w/ opiate dependence


   -controlled on pain meds.


Hypotension


   -resolved








VTE prophylaxis: Lovenox


Code Status: DNR





Status:  Inpatient for greater than 2 midnight stay.


Disposition:  Med surge with discharge once patient gets placement.  He refused 

going to [facility].


This patient is new to me.  Reviewed patient's chart/records for this visit.


____





SUBJECTIVE:  Today patient feels well, no complaints





OBJECTIVE:





Physical Exam:


General: The patient is a middle-aged male who is alert and in no acute 

distress. 


HEENT: normocephalic, extraocular movements intact, conjunctivae clear. Mucous 

membranes moist.


Neck: trachea midline, no visible masses. 


CV: +S1/S2, RRR, no MRG.


Resp: unlabored, CTAB no RRW.


Abd: soft and nondistended. 


Musculoskeletal:  Reduced muscle tone/bulk.


Neuro: cranial nerves II  XII grossly intact. Intact gross motor and sensory 

function.


Psych:  Appropriate mood and appropriate affect. 


Skin:  No pallor.  No petechiae.


Heme/lymph:  No peripheral edema at bilateral lower extremities.





Labs/Imaging/Other Tests: Personally reviewed/interpreted.








Objective: 


 Vital Signs











Temp Pulse Resp BP Pulse Ox


 


 36.6 C   87   18   112/78   97 


 


 01/31/18 14:16  01/31/18 14:16  01/31/18 14:16  01/31/18 14:16  01/31/18 14:16








 Laboratory Results





 01/30/18 04:45 





 01/30/18 04:45 





 











 01/30/18 01/31/18 02/01/18





 05:59 05:59 05:59


 


Intake Total  150 


 


Balance  150 








 











PT  16.3 SEC (12.0-15.0)  H  01/27/18  06:00    


 


INR  1.29  (0.83-1.16)  H  01/27/18  06:00    














ICD10 Worksheet


Patient Problems: 


 Problems











Problem Status Onset


 


Altered mental status Acute  


 


Hypotension Acute  


 


Pleural effusion Acute  


 


Alcohol dependence Active  


 


Cellulitis Active  


 


Kidney disease Active  


 


Necrotizing fasciitis Active  


 


Acute renal failure Acute  


 


C. difficile diarrhea Acute  ~05/12/17


 


Dyspnea Acute  


 


Edema extremities Acute  


 


HTN (hypertension) Acute  


 


Hepatorenal failure Acute  


 


Hyperammonemia Acute  


 


Hyperbilirubinemia Acute  


 


Hyperkalemia Acute  


 


Liver cirrhosis Acute  


 


MRSA (methicillin resistant staph aureus) culture positive Acute  07/21/14


 


Palliative care encounter Acute  


 


Pneumonia Acute  


 


Squamous cell cancer of skin of right shoulder Acute  


 


Squamous cell carcinoma Acute  


 


Weakness Acute  


 


Alcoholism Chronic  


 


Gait instability Chronic  


 


Hepatitis C Chronic  


 


Low back pain Chronic

## 2018-02-01 VITALS
RESPIRATION RATE: 12 BRPM | HEART RATE: 82 BPM | DIASTOLIC BLOOD PRESSURE: 90 MMHG | SYSTOLIC BLOOD PRESSURE: 135 MMHG | OXYGEN SATURATION: 99 %

## 2018-02-01 VITALS — TEMPERATURE: 97.8 F

## 2018-02-01 RX ADMIN — LACTULOSE SCH GM: 20 SOLUTION ORAL at 09:08

## 2018-02-01 RX ADMIN — OXYCODONE HYDROCHLORIDE PRN MG: 15 TABLET ORAL at 10:40

## 2018-02-01 RX ADMIN — PROPRANOLOL HYDROCHLORIDE SCH MG: 20 TABLET ORAL at 09:08

## 2018-02-01 RX ADMIN — SERTRALINE HYDROCHLORIDE SCH MG: 50 TABLET, FILM COATED ORAL at 09:09

## 2018-02-01 RX ADMIN — OXYCODONE HYDROCHLORIDE PRN MG: 15 TABLET ORAL at 05:59

## 2018-02-01 RX ADMIN — GABAPENTIN SCH MG: 300 CAPSULE ORAL at 09:08

## 2018-02-01 RX ADMIN — VANCOMYCIN HYDROCHLORIDE SCH MG: KIT at 09:08

## 2018-02-01 RX ADMIN — FUROSEMIDE SCH MG: 40 TABLET ORAL at 09:09

## 2018-02-01 RX ADMIN — SPIRONOLACTONE SCH MG: 50 TABLET, FILM COATED ORAL at 09:09

## 2018-02-01 RX ADMIN — ENOXAPARIN SODIUM SCH MG: 100 INJECTION SUBCUTANEOUS at 09:07

## 2018-02-01 RX ADMIN — PANTOPRAZOLE SODIUM SCH MG: 40 TABLET, DELAYED RELEASE ORAL at 09:08

## 2018-02-01 NOTE — ASMTCMCOM
CM Note

 

CM Note                       

Notes:

Patient has changed his mind and states he wants to go home with home care services. [facility] is current with the  patient and will follow after d/c for OT/PT/RN.Orders and transfer of 

care summary faxed to [facility] at [phone]. Kristi from [facility] called and left a message about 

patient being eligible for SNF rehab. Left a message for Kristi to let her know patient has chosen 

to return home with home care services. Patient was given a taxi voucher to get him from the 

hospital directly to his apartment. Yony, patient's  with [facility] 

([phone]) was contacted to let him know patient is being d/c'ed today. CM is available for any 


d/c needs that arise.

 

Date Signed:  02/01/2018 10:47 AM

Electronically Signed By:Rizwana Wade LCSW

## 2018-02-01 NOTE — PDIAF
- Diagnosis


Diagnosis: PNA


Code Status: Do Not Resuscitate





- Medication Management


Discharge Medications: 


 Medications to Continue on Transfer





Omeprazole 20 mg PO DAILY 12/04/17 [Last Taken Unknown]


Propranolol HCl [Inderal 20mg (*)] 20 mg PO BID 12/04/17 [Last Taken Unknown]


Sertraline HCl [Zoloft 50mg (*)] 50 mg PO DAILY 12/04/17 [Last Taken Unknown]


Lactulose [Constulose] 20 gm PO TID 01/26/18 [Last Taken Unknown]


oxyCODONE IR [Oxycodone Ir (*)] 5 - 10 mg PO Q4H PRN 01/26/18 [Last Taken 

Unknown]


Acetaminophen [Tylenol 325mg (*)] 650 mg PO Q4HRS PRN  tab 02/01/18 [Last Taken 

Unknown]


Furosemide [Lasix 40 MG (*)] 40 mg PO DAILY  tab 02/01/18 [Last Taken Unknown]


Gabapentin [Neurontin 300 MG (*)] 300 mg PO TID  cap 02/01/18 [Last Taken 

Unknown]


Spironolactone [Aldactone] 50 mg PO DAILY #30 tab 02/01/18 [Last Taken Unknown]


levOFLOXACIN [levAQUIN (*)] 750 mg PO DAILY #1 tab 02/01/18 [Last Taken Unknown]








Discharge Medications: Refer to the Discharge Home Medication list for PRN 

reason.


PICC Care - Routine: N/A





- Orders


Services needed: Home Care, Registered Nurse, Physical Therapy, Occupational 

Therapy


Home Care Face to Face: I certify that this patient was under my care and that 

I had the required face-to-face encounter meeting the encounter requirements on 

the discharge day.  My findings support the fact that the patient is homebound 

as defined in


Home Care Face to Face Continued: CMS Chapter 7 Medicare Benefits Manual 30.1.1

, The condition of the patient is such that there exists a normal inability to 

leave home and consequently, leaving home would require a considerable and 

taxing effort.


Isolation Type: CDIFF Isolation, Contact Isolation


Diet Recommendation: no restrictions on diet





- Follow Up Care


Current Providers and Referrals: 


Patient,NotPresent [Unknown] - As per Instructions

## 2018-02-01 NOTE — ASDISCHSUM
----------------------------------------------

Discharge Information

----------------------------------------------

Plan Status:Home with Home Health                    Medically Cleared to Leave:

Discharge Date:02/01/2018 01:00 PM                   CM D/C Disposition:Home Health Service

ADT D/C Disposition:Home Health Service              Projected Discharge Date:02/01/2018 11:00 AM

Transportation at D/C:Taxicab                        Discharge Delay Reason:

Follow-Up Date:02/01/2018 11:00 AM                   Discharge Slot:

Final Diagnosis:Liver failure, PNA, Cirrhosis ETOH, Chronic pain-narc

----------------------------------------------

Placement Information

----------------------------------------------

Referral Type:*Hospice                               Referral ID:[id number]

Provider Name:

Address 1:                                           Phone Number:

Address 2:                                           Fax Number:

City:                                                Selection Factors:

State:

 

Referral Type:*Home Health Care Services             Referral ID:[id number]

Provider Name:[facility]

Address 1:[address] [place] [address]                        Phone [phone]

Address 2:                                           Fax [phone]

City:[place]                                          Selection Factors:

State:[place]

 

Referral Type:*Nursing Home/SNF                      Referral ID:[id number]

Provider Name:

Address 1:                                           Phone Number:

Address 2:                                           Fax Number:

City:                                                Selection Factors:

State:

 

----------------------------------------------

Patient Contact Information

----------------------------------------------

Contact Name:MONICA                            Relationship:Sister

Address:                                             Home [phone]

                                                     Work Phone:

City:                                                [facility] Phone:

[facility] Code:                                      Email:

----------------------------------------------

Financial Information

----------------------------------------------

Financial Class:MD

Primary Plan Desc[place] [place] [place]         Primary Plan Number:[id number]

Secondary Plan Desc:                                 Secondary Plan Number:

 

 

----------------------------------------------

Assessment Information

----------------------------------------------

----------------------------------------------

[facility] CM Progress Note

----------------------------------------------

CM Note

 

CM Note                       

Notes:

Pt admitted with AMS, pleural effusion, hypotension. Hx MRSA in past. currently on vanco and 

levaquin. CM will follow for any d/c neds.

 

Date Signed:  01/27/2018 05:18 PM

Electronically Signed By:IVAN Banerjee

 

 

----------------------------------------------

[facility] CM Progress Note

----------------------------------------------

CM Note

 

CM Note                       

Notes:

CM met w/ pt and Yony, pts  for dispo planning. OT is recommending SNF. CM 

requested that an order is put in for PT. Pt is current w/ [facility] and [facility]. Updates 

sent to facilities. Yony reports that pt is unable to keep track of his medications. Yony reports 


that pt was taking meds from different days of the week on the wrong days. Pt reports that he is 

not interested in going back to [facility]. Pt reports that he will think about going to 

a SNF. Pt hopes to return back to his apartment. CM to follow.



Plan: TBD; awaiting PT recommendations 

 

Date Signed:  01/29/2018 12:05 PM

Electronically Signed By:CHAO Young

 

 

----------------------------------------------

[facility] DEWAYNE Progress Note

----------------------------------------------

CM Note

 

CM Note                       

Notes:

CM completed the ULTC-100. CM faxed attached it to [employer]. CM spoke w/ Florecita Macias and she 

is recommending Medicaid LTC stay. Pt reports that he does not want to think about making any 

decisions about referrals to SNF today. CM notified [employer] that pt will need LTC. CM to follow.



Plan: SNF

 

Date Signed:  01/29/2018 04:01 PM

Electronically Signed By:CHAO Young

 

 

----------------------------------------------

[facility]WAY[facility] Progress Note

----------------------------------------------

DEWAYNE Note

 

DEWAYNE Note                       

Notes:

Samantha from [facility] called to get updated information on patient. He remains an open client 


with them.Spoke with patient who states he will go to [facility] if it's necessary to get his 

medications straightened out. He wants to return to his apartment but knows he may need additional 

support right now. He has not thought about the long term. [facility] will take the patient if 

he wants to return there. Patient does not want to return to [place]. CM will follow. 

 

Date Signed:  01/30/2018 11:11 AM

Electronically Signed By:Rizwana Wade LCSW

 

 

----------------------------------------------

[facility] CM Progress Note

----------------------------------------------

CM Note

 

CM Note                       

Notes:

Today per MD and RN, Pt. states he does NOT want to d/c to [facility].  Kendrick met w/ Pt. in 

room.   Sent new referrals via [employer].  Pt. states today he is most interested in [facility] 

and [facility].  



Princess [phone] from [facility] states Pt. is approved for Medicaid LTC stay.  Please call Princess to 

let her know where Pt. d'cs.



CM to follow for d/c POC.  

 

Date Signed:  01/31/2018 04:52 PM

Electronically Signed By:Stephanie Hinton LCSW

 

 

----------------------------------------------

[facility] CM Progress Note

----------------------------------------------

CM Note

 

CM Note                       

Notes:

Patient has changed his mind and states he wants to go home with home care services. [facility] is current with the  patient and will follow after d/c for OT/PT/RN.Orders and transfer of 

care summary faxed to [facility] at [phone]. Kristi from [facility] called and left a message about 

patient being eligible for SNF rehab. Left a message for Kristi to let her know patient has chosen 

to return home with home care services. Patient was given a taxi voucher to get him from the 

hospital directly to his apartment. Yony, patient's  with [facility] 

([phone]) was contacted to let him know patient is being d/c'ed today. CM is available for any 


d/c needs that arise.

 

Date Signed:  02/01/2018 10:47 AM

Electronically Signed By:Rizwana Wade LCSW

 

 

----------------------------------------------

Intervention Information

----------------------------------------------

## 2018-02-01 NOTE — GDS
[f rep st]



                                                             DISCHARGE SUMMARY





DISCHARGE DIAGNOSES:  

1.  Community-acquired pneumonia.

2.  History of Clostridium difficile.

3.  Encephalopathy.

4.  Liver cirrhosis secondary to hepatitis C and alcohol abuse.

5.  Anemia of chronic disease.

6.  Chronic pain.

7.  Hypotension.



STUDIES AND PROCEDURES DONE:  CT of the head.



PHYSICAL EXAM:  GENERAL:  The patient is alert.  VITAL SIGNS:  Afebrile at 36.6, pulse 82, respirator
y rate is 12, blood pressure is 135/90.  He is saturating 99% on room air.  I have seen and evaluated
 the patient on the day of discharge.



HOSPITAL COURSE:  The patient is a 57-year-old male, past medical history of liver cirrhosis with chr
onic medical conditions.  He was admitted the hospital secondary to complaints of cough.  He was eval
uated and diagnosed with:

1.  Community-acquired pneumonia.  During this hospitalization, he received treatment with Levaquin. 
 His condition has significantly improved.  He has 1 more day of Levaquin, which has been prescribed 
for him at the time of disposition.

2.  History of C difficile.  He was treated prophylactically with oral vancomycin during this hospita
lization.  This will not be continued in the outpatient setting, as the patient says he will not be c
ompliant with this medication.

3.  Encephalopathy.  This has improved.  The patient is mentating reasonably.

4.  History of liver cirrhosis secondary to hepatitis C and alcohol abuse.  Condition is stable.  His
 home medications have been continued.

5.  Anemia of chronic disease, stable.

6.  Chronic pain with chronic opioids.  He has not been provided a prescription for narcotics at the 
time of disposition.  He is to follow up with [facility] for further medications.



DISPOSITION:  The patient has been offered safe discharge to [facility] at the time of disposition
, however, is refusing.  He states that he will not go to a skilled nursing facility and is adamant a
bout being discharged home.  I have instructed him that this is against my medical advice.  However, 
he is unreasonable and unwilling to go to a skilled nursing facility.  I have, however, provided a pr
escription for home care for him at the time of disposition.  I have instructed him to take his medic
ations as previously prescribed and to discontinue any further alcohol use.



DISCHARGE MEDICATIONS:  New prescriptions include his spironolactone has been adjusted to 50 mg daily
, I believe from 100 mg daily.  His Lasix has been adjusted to 40 mg daily from 40 mg twice daily.  H
is Neurontin has been decreased from 1200 mg t.i.d. to 300 mg t.i.d.  These diuretics may need to be 
increased in the future; however, during this hospitalization, he was tolerating these doses appropri
ately.



FOLLOWUP:  Will be at [facility] by his normal provider, as well as home health care.  



I have discussed the patient's disposition with the  as well as the patient at length.  



I spent greater than 35 minutes in the care, coordination, and management of this patient's dispositi
on.





Job #:  [id number]

## 2018-06-13 ENCOUNTER — HOSPITAL ENCOUNTER (OUTPATIENT)
Dept: HOSPITAL 80 - FIMAGING | Age: 58
End: 2018-06-13
Attending: INTERNAL MEDICINE
Payer: MEDICAID

## 2018-06-13 DIAGNOSIS — R16.1: ICD-10-CM

## 2018-06-13 DIAGNOSIS — K80.20: ICD-10-CM

## 2018-06-13 DIAGNOSIS — B18.2: Primary | ICD-10-CM

## 2019-04-04 ENCOUNTER — HOSPITAL ENCOUNTER (OUTPATIENT)
Dept: HOSPITAL 80 - FIMAGING | Age: 59
End: 2019-04-04
Attending: INTERNAL MEDICINE
Payer: MEDICAID

## 2019-04-04 DIAGNOSIS — K70.31: Primary | ICD-10-CM

## 2019-04-04 DIAGNOSIS — R16.1: ICD-10-CM

## 2019-06-26 ENCOUNTER — HOSPITAL ENCOUNTER (EMERGENCY)
Dept: HOSPITAL 80 - FED | Age: 59
Discharge: LEFT BEFORE DISCHARGE | End: 2019-06-26
Payer: MEDICAID

## 2019-08-13 NOTE — HOSPPROG
Hospitalist Progress Note


Assessment/Plan: 


Assessment:  57-year-old male presents with acute encephalopathy





Plan:





1. Acute encephalopathy.  Evidenced by global brain dysfunction characterized 

as confusion, disorientation, found down, all of which are acute changes from 

his baseline, most likely secondary to the toxic effects of infection


-treat infection


-mental status has begun to improve, appears to be nearing baseline


-likely will require SNF for asst w/ ADLs/IV abx s/p discharge





2. MRSA bacteremia. POA, 3/4 blood cultures are currently positive, awaiting 

sensitivity, unclear source


-d/w Dr. Carlson, recommends CT abd/pelv given unclear source and abd pain


-T-spine MRI w/o abscess





3. History of C diff colitis.  Per chart review, there were reports of diarrhea 

and vomiting prior to presentation, the patient's ability to provide history 

was impaired


-the patient has chronically been on lactulose so his bowel movement frequency 

may be entirely attributable to this agent


-his C diff PCR is positive


-cont on vancomycin 125 4 times daily, but suspect that we may reduce this to 

purely suppressive dosing given that he may not have active C diff and we may 

need to only continue this agent while he is on IV vancomycin





4. Chronic cirrhosis.  Secondary to alcohol intake and hepatitis C virus, 

patient reports that he has been sober for the last 6 months


-MELD 11, low risk of short-term mortality specifically from liver disease


-that said, d/w palliative care, patient expressing that he has limited 

interest in aggressive interventions and specifically said, "I want to go home 

with hospice"


-will discuss further w/ patient if this is in fact his wish, b/c he would be 

appropriate for hospice if this is consistent with his goals of care


-previously has MOST Form w/ DNR/DNI status, status updated





5. Chronic pain with continuous opiate dependency.  Patient reports that he 

chronically takes oxycodone and tramadol for abdominal pain, which he 

associates with his cirrhosis


-PRN oxy IR





6. Anemia of chronic inflammatory disease.  Fecal occult blood test negative, 

secondary to liver disease, continue monitor CBC





Diet.  Regular as tolerates, SLP eval





Prophylaxis.  High risk patient, Lovenox 40





Code. DNR





Disposition.  Anticipated discharge is uncertain this time, anticipated length 

stay greater than 48 hr, for further workup and treatment of condition outlined 

above.





High complexity patient, with high risk of worsening morbidity and / or 

mortality.





Subjective: patient reports mild ongoing abd pain


Objective: 


 Vital Signs











Temp Pulse Resp BP Pulse Ox


 


 37.0 C   74   16   107/67   98 


 


 12/06/17 19:56  12/06/17 19:56  12/06/17 19:56  12/06/17 19:56  12/06/17 19:56








 Laboratory Results





 12/06/17 04:45 





 12/06/17 04:45 





 











 12/05/17 12/06/17 12/07/17





 05:59 05:59 05:59


 


Intake Total 2300 1794 2058


 


Output Total 200  


 


Balance 2100 1794 2058








 











PT  16.4 SEC (12.0-15.0)  H  12/04/17  12:20    


 


INR  1.30  (0.83-1.16)  H  12/04/17  12:20    














- Physical Exam


Constitutional: no apparent distress, not in pain, chronically ill appearing, 

uncomfortable


Cardiovascular: regular rate and rhythym, no murmur, rub, or gallop, No edema


Respiratory: no respiratory distress, no rales or rhonchi, clear to auscultation


Gastrointestinal: normoactive bowel sounds, tenderness (mild to mod depth palp)

, No guarding, No distension


Musculoskeletal: other (no tenderness over C/T/L spine or paraspinal muscles)


Neurologic: sensation intact bilaterally, other (AAOx2 (person and place, not 

time)), No weakness


Psychiatric: not anxious, depressed, flat affect, No agitated





ICD10 Worksheet


Patient Problems: 


 Problems











Problem Status Onset


 


Altered mental status Acute  


 


Pneumonia Acute  


 


Alcohol dependence Active  


 


Cellulitis Active  


 


Kidney disease Active  


 


Necrotizing fasciitis Active  


 


Acute renal failure Acute  


 


C. difficile diarrhea Acute  ~05/12/17


 


Dyspnea Acute  


 


Edema extremities Acute  


 


HTN (hypertension) Acute  


 


Hepatorenal failure Acute  


 


Hyperammonemia Acute  


 


Hyperbilirubinemia Acute  


 


Hyperkalemia Acute  


 


Liver cirrhosis Acute  


 


MRSA (methicillin resistant staph aureus) culture positive Acute  07/21/14


 


Palliative care encounter Acute  


 


Squamous cell cancer of skin of right shoulder Acute  


 


Squamous cell carcinoma Acute  


 


Weakness Acute  


 


Alcoholism Chronic  


 


Gait instability Chronic  


 


Hepatitis C Chronic  


 


Low back pain Chronic Electrodesiccation Text: The wound bed was treated with electrodesiccation after the biopsy was performed.